# Patient Record
Sex: FEMALE | Race: WHITE | HISPANIC OR LATINO
[De-identification: names, ages, dates, MRNs, and addresses within clinical notes are randomized per-mention and may not be internally consistent; named-entity substitution may affect disease eponyms.]

---

## 2017-04-17 ENCOUNTER — FORM ENCOUNTER (OUTPATIENT)
Age: 57
End: 2017-04-17

## 2017-04-18 ENCOUNTER — APPOINTMENT (OUTPATIENT)
Dept: RADIOLOGY | Facility: CLINIC | Age: 57
End: 2017-04-18

## 2017-04-18 ENCOUNTER — OUTPATIENT (OUTPATIENT)
Dept: OUTPATIENT SERVICES | Facility: HOSPITAL | Age: 57
LOS: 1 days | End: 2017-04-18
Payer: COMMERCIAL

## 2017-04-18 DIAGNOSIS — Z00.8 ENCOUNTER FOR OTHER GENERAL EXAMINATION: ICD-10-CM

## 2017-04-18 PROCEDURE — 73080 X-RAY EXAM OF ELBOW: CPT

## 2017-05-31 ENCOUNTER — RESULT REVIEW (OUTPATIENT)
Age: 57
End: 2017-05-31

## 2017-09-07 ENCOUNTER — APPOINTMENT (OUTPATIENT)
Dept: RADIOLOGY | Facility: CLINIC | Age: 57
End: 2017-09-07
Payer: COMMERCIAL

## 2017-09-07 ENCOUNTER — OUTPATIENT (OUTPATIENT)
Dept: OUTPATIENT SERVICES | Facility: HOSPITAL | Age: 57
LOS: 1 days | End: 2017-09-07
Payer: COMMERCIAL

## 2017-09-07 DIAGNOSIS — Z00.8 ENCOUNTER FOR OTHER GENERAL EXAMINATION: ICD-10-CM

## 2017-09-07 PROCEDURE — 77080 DXA BONE DENSITY AXIAL: CPT

## 2017-09-07 PROCEDURE — 77080 DXA BONE DENSITY AXIAL: CPT | Mod: 26

## 2017-10-12 ENCOUNTER — LABORATORY RESULT (OUTPATIENT)
Age: 57
End: 2017-10-12

## 2017-10-18 LAB
ALBUMIN MFR SERPL ELPH: 65 %
ALBUMIN SERPL ELPH-MCNC: 4.3 G/DL
ALBUMIN SERPL-MCNC: 4.4 G/DL
ALBUMIN/GLOB SERPL: 1.8 RATIO
ALP BLD-CCNC: 74 U/L
ALPHA1 GLOB MFR SERPL ELPH: 4.2 %
ALPHA1 GLOB SERPL ELPH-MCNC: 0.3 G/DL
ALPHA2 GLOB MFR SERPL ELPH: 9.6 %
ALPHA2 GLOB SERPL ELPH-MCNC: 0.7 G/DL
ALT SERPL-CCNC: 26 U/L
ANA PAT FLD IF-IMP: ABNORMAL
ANA SER IF-ACNC: ABNORMAL
ANION GAP SERPL CALC-SCNC: 17 MMOL/L
AST SERPL-CCNC: 30 U/L
B-GLOBULIN MFR SERPL ELPH: 11.3 %
B-GLOBULIN SERPL ELPH-MCNC: 0.8 G/DL
BILIRUB SERPL-MCNC: 0.5 MG/DL
BUN SERPL-MCNC: 12 MG/DL
CA SERPL QL: NORMAL
CALCIUM SERPL-MCNC: 9.6 MG/DL
CHLORIDE SERPL-SCNC: 100 MMOL/L
CO2 SERPL-SCNC: 23 MMOL/L
CREAT SERPL-MCNC: 0.83 MG/DL
CRYOFIB BLD-MCNC: NEGATIVE
CRYOGLOB SERPL-MCNC: NEGATIVE
DEPRECATED KAPPA LC FREE/LAMBDA SER: 1.46 RATIO
GAMMA GLOB FLD ELPH-MCNC: 0.7 G/DL
GAMMA GLOB MFR SERPL ELPH: 9.9 %
GLUCOSE SERPL-MCNC: 105 MG/DL
HCT VFR BLD CALC: 44.7 %
HGB BLD-MCNC: 15.7 G/DL
IGA SER QL IEP: 180 MG/DL
IGG SER QL IEP: 696 MG/DL
IGM SER QL IEP: 70 MG/DL
INTERPRETATION SERPL IEP-IMP: NORMAL
KAPPA LC CSF-MCNC: 1.36 MG/DL
KAPPA LC SERPL-MCNC: 1.99 MG/DL
M PROTEIN SPEC IFE-MCNC: NORMAL
MCHC RBC-ENTMCNC: 31.7 PG
MCHC RBC-ENTMCNC: 35.2 GM/DL
MCV RBC AUTO: 90 FL
PLATELET # BLD AUTO: 345 K/UL
POTASSIUM SERPL-SCNC: 4.3 MMOL/L
PROT SERPL-MCNC: 6.8 G/DL
RBC # BLD: 4.97 M/UL
RBC # FLD: 11.2 %
RHEUMATOID FACT SER QL: 7 IU/ML
SODIUM SERPL-SCNC: 140 MMOL/L
TSH SERPL-ACNC: 0.82 UIU/ML
WBC # FLD AUTO: 4.6 K/UL

## 2017-10-25 ENCOUNTER — LABORATORY RESULT (OUTPATIENT)
Age: 57
End: 2017-10-25

## 2017-10-25 ENCOUNTER — APPOINTMENT (OUTPATIENT)
Dept: RHEUMATOLOGY | Facility: CLINIC | Age: 57
End: 2017-10-25
Payer: COMMERCIAL

## 2017-10-25 VITALS
HEIGHT: 61 IN | BODY MASS INDEX: 17.94 KG/M2 | DIASTOLIC BLOOD PRESSURE: 86 MMHG | SYSTOLIC BLOOD PRESSURE: 119 MMHG | HEART RATE: 66 BPM | WEIGHT: 95 LBS

## 2017-10-25 DIAGNOSIS — Z82.61 FAMILY HISTORY OF ARTHRITIS: ICD-10-CM

## 2017-10-25 DIAGNOSIS — Z78.9 OTHER SPECIFIED HEALTH STATUS: ICD-10-CM

## 2017-10-25 DIAGNOSIS — Z87.898 PERSONAL HISTORY OF OTHER SPECIFIED CONDITIONS: ICD-10-CM

## 2017-10-25 PROCEDURE — 99204 OFFICE O/P NEW MOD 45 MIN: CPT

## 2017-10-26 ENCOUNTER — APPOINTMENT (OUTPATIENT)
Dept: RADIOLOGY | Facility: CLINIC | Age: 57
End: 2017-10-26
Payer: COMMERCIAL

## 2017-10-26 ENCOUNTER — OUTPATIENT (OUTPATIENT)
Dept: OUTPATIENT SERVICES | Facility: HOSPITAL | Age: 57
LOS: 1 days | End: 2017-10-26
Payer: COMMERCIAL

## 2017-10-26 DIAGNOSIS — Z00.8 ENCOUNTER FOR OTHER GENERAL EXAMINATION: ICD-10-CM

## 2017-10-26 LAB
25(OH)D3 SERPL-MCNC: 86.9 NG/ML
BASOPHILS # BLD AUTO: 0.03 K/UL
BASOPHILS NFR BLD AUTO: 0.6 %
C3 SERPL-MCNC: 97 MG/DL
C4 SERPL-MCNC: 23 MG/DL
CCP AB SER IA-ACNC: <8 UNITS
CREAT SPEC-SCNC: 15 MG/DL
CREAT/PROT UR: NORMAL
CRP SERPL-MCNC: <0.2 MG/DL
DSDNA AB SER-ACNC: <12 IU/ML
ENA RNP AB SER IA-ACNC: <0.2 AL
ENA SM AB SER IA-ACNC: <0.2 AL
ENA SS-A AB SER IA-ACNC: <0.2 AL
ENA SS-B AB SER IA-ACNC: <0.2 AL
EOSINOPHIL # BLD AUTO: 0.07 K/UL
EOSINOPHIL NFR BLD AUTO: 1.5 %
ERYTHROCYTE [SEDIMENTATION RATE] IN BLOOD BY WESTERGREN METHOD: 2 MM/HR
HCT VFR BLD CALC: 45.4 %
HGB BLD-MCNC: 15 G/DL
IMM GRANULOCYTES NFR BLD AUTO: 0.2 %
LYMPHOCYTES # BLD AUTO: 1.05 K/UL
LYMPHOCYTES NFR BLD AUTO: 21.8 %
MAN DIFF?: NORMAL
MCHC RBC-ENTMCNC: 31.2 PG
MCHC RBC-ENTMCNC: 33 GM/DL
MCV RBC AUTO: 94.4 FL
MONOCYTES # BLD AUTO: 0.48 K/UL
MONOCYTES NFR BLD AUTO: 10 %
NEUTROPHILS # BLD AUTO: 3.18 K/UL
NEUTROPHILS NFR BLD AUTO: 65.9 %
PLATELET # BLD AUTO: 342 K/UL
PROT UR-MCNC: <4 MG/DL
RBC # BLD: 4.81 M/UL
RBC # FLD: 13.2 %
RF+CCP IGG SER-IMP: NEGATIVE
RHEUMATOID FACT SER QL: 7 IU/ML
WBC # FLD AUTO: 4.82 K/UL

## 2017-10-26 PROCEDURE — 72114 X-RAY EXAM L-S SPINE BENDING: CPT

## 2017-10-26 PROCEDURE — 72114 X-RAY EXAM L-S SPINE BENDING: CPT | Mod: 26

## 2017-10-30 LAB
ANA SER IF-ACNC: NEGATIVE
B2 GLYCOPROT1 IGA SERPL IA-ACNC: <5 SAU
CARDIOLIPIN IGM SER-MCNC: 8.1 MPL
CARDIOLIPIN IGM SER-MCNC: <5 GPL
G6PD SER-CCNC: 11.8 U/G HB
HLA-B27 RELATED AG QL: NORMAL

## 2017-11-01 LAB — HISTONE AB SER QL: 0.4 UNITS

## 2017-11-13 ENCOUNTER — APPOINTMENT (OUTPATIENT)
Dept: RHEUMATOLOGY | Facility: CLINIC | Age: 57
End: 2017-11-13
Payer: COMMERCIAL

## 2017-11-13 VITALS
HEIGHT: 61 IN | DIASTOLIC BLOOD PRESSURE: 85 MMHG | WEIGHT: 95 LBS | HEART RATE: 71 BPM | SYSTOLIC BLOOD PRESSURE: 120 MMHG | BODY MASS INDEX: 17.94 KG/M2

## 2017-11-13 PROCEDURE — 20605 DRAIN/INJ JOINT/BURSA W/O US: CPT | Mod: LT

## 2017-11-13 PROCEDURE — 99214 OFFICE O/P EST MOD 30 MIN: CPT | Mod: 25

## 2017-11-22 ENCOUNTER — OUTPATIENT (OUTPATIENT)
Dept: OUTPATIENT SERVICES | Facility: HOSPITAL | Age: 57
LOS: 1 days | End: 2017-11-22
Payer: COMMERCIAL

## 2017-11-22 ENCOUNTER — APPOINTMENT (OUTPATIENT)
Dept: MAMMOGRAPHY | Facility: CLINIC | Age: 57
End: 2017-11-22
Payer: COMMERCIAL

## 2017-11-22 ENCOUNTER — APPOINTMENT (OUTPATIENT)
Dept: ULTRASOUND IMAGING | Facility: CLINIC | Age: 57
End: 2017-11-22
Payer: COMMERCIAL

## 2017-11-22 DIAGNOSIS — Z00.8 ENCOUNTER FOR OTHER GENERAL EXAMINATION: ICD-10-CM

## 2017-11-22 PROCEDURE — 77063 BREAST TOMOSYNTHESIS BI: CPT | Mod: 26

## 2017-11-22 PROCEDURE — 76641 ULTRASOUND BREAST COMPLETE: CPT | Mod: 26,50

## 2017-11-22 PROCEDURE — 77067 SCR MAMMO BI INCL CAD: CPT

## 2017-11-22 PROCEDURE — G0202: CPT | Mod: 26

## 2017-11-22 PROCEDURE — 77063 BREAST TOMOSYNTHESIS BI: CPT

## 2017-11-22 PROCEDURE — 76641 ULTRASOUND BREAST COMPLETE: CPT

## 2018-03-23 ENCOUNTER — APPOINTMENT (OUTPATIENT)
Dept: INTERNAL MEDICINE | Facility: CLINIC | Age: 58
End: 2018-03-23
Payer: COMMERCIAL

## 2018-03-23 VITALS
RESPIRATION RATE: 16 BRPM | TEMPERATURE: 98.1 F | WEIGHT: 96 LBS | BODY MASS INDEX: 18.12 KG/M2 | SYSTOLIC BLOOD PRESSURE: 100 MMHG | HEIGHT: 61 IN | DIASTOLIC BLOOD PRESSURE: 74 MMHG

## 2018-03-23 DIAGNOSIS — R53.83 OTHER FATIGUE: ICD-10-CM

## 2018-03-23 PROCEDURE — 99396 PREV VISIT EST AGE 40-64: CPT | Mod: 25

## 2018-03-23 PROCEDURE — 90715 TDAP VACCINE 7 YRS/> IM: CPT | Mod: GA

## 2018-03-23 PROCEDURE — 90471 IMMUNIZATION ADMIN: CPT

## 2018-03-23 RX ORDER — DESONIDE 0.5 MG/G
0.05 OINTMENT TOPICAL
Qty: 60 | Refills: 0 | Status: DISCONTINUED | COMMUNITY
Start: 2018-01-24

## 2018-05-15 DIAGNOSIS — Z87.09 PERSONAL HISTORY OF OTHER DISEASES OF THE RESPIRATORY SYSTEM: ICD-10-CM

## 2018-05-21 DIAGNOSIS — J40 BRONCHITIS, NOT SPECIFIED AS ACUTE OR CHRONIC: ICD-10-CM

## 2018-06-05 ENCOUNTER — RESULT REVIEW (OUTPATIENT)
Age: 58
End: 2018-06-05

## 2018-09-13 LAB
APPEARANCE: CLEAR
BACTERIA: NEGATIVE
BILIRUBIN URINE: NEGATIVE
BLOOD URINE: NEGATIVE
CHOLEST SERPL-MCNC: 172 MG/DL
CHOLEST/HDLC SERPL: 2 RATIO
COLOR: YELLOW
GLUCOSE QUALITATIVE U: NEGATIVE MG/DL
HDLC SERPL-MCNC: 87 MG/DL
HYALINE CASTS: 1 /LPF
KETONES URINE: NEGATIVE
LDLC SERPL CALC-MCNC: 72 MG/DL
LEUKOCYTE ESTERASE URINE: NEGATIVE
MICROSCOPIC-UA: NORMAL
NITRITE URINE: NEGATIVE
PH URINE: 6.5
PROTEIN URINE: NEGATIVE MG/DL
RED BLOOD CELLS URINE: 1 /HPF
SPECIFIC GRAVITY URINE: 1.01
SQUAMOUS EPITHELIAL CELLS: 1 /HPF
TRIGL SERPL-MCNC: 64 MG/DL
UROBILINOGEN URINE: NEGATIVE MG/DL
WHITE BLOOD CELLS URINE: 0 /HPF

## 2018-11-27 ENCOUNTER — APPOINTMENT (OUTPATIENT)
Dept: ULTRASOUND IMAGING | Facility: CLINIC | Age: 58
End: 2018-11-27
Payer: COMMERCIAL

## 2018-11-27 ENCOUNTER — APPOINTMENT (OUTPATIENT)
Dept: MAMMOGRAPHY | Facility: CLINIC | Age: 58
End: 2018-11-27
Payer: COMMERCIAL

## 2018-11-27 ENCOUNTER — OUTPATIENT (OUTPATIENT)
Dept: OUTPATIENT SERVICES | Facility: HOSPITAL | Age: 58
LOS: 1 days | End: 2018-11-27
Payer: COMMERCIAL

## 2018-11-27 DIAGNOSIS — Z00.8 ENCOUNTER FOR OTHER GENERAL EXAMINATION: ICD-10-CM

## 2018-11-27 PROCEDURE — 77063 BREAST TOMOSYNTHESIS BI: CPT | Mod: 26

## 2018-11-27 PROCEDURE — 76641 ULTRASOUND BREAST COMPLETE: CPT | Mod: 26,50

## 2018-11-27 PROCEDURE — 77067 SCR MAMMO BI INCL CAD: CPT | Mod: 26

## 2018-11-27 PROCEDURE — 77063 BREAST TOMOSYNTHESIS BI: CPT

## 2018-11-27 PROCEDURE — 76641 ULTRASOUND BREAST COMPLETE: CPT

## 2018-11-27 PROCEDURE — 77067 SCR MAMMO BI INCL CAD: CPT

## 2019-03-08 ENCOUNTER — LABORATORY RESULT (OUTPATIENT)
Age: 59
End: 2019-03-08

## 2019-03-11 LAB
25(OH)D3 SERPL-MCNC: 61.3 NG/ML
ALBUMIN SERPL ELPH-MCNC: 4.9 G/DL
ALP BLD-CCNC: 90 U/L
ALT SERPL-CCNC: 28 U/L
ANION GAP SERPL CALC-SCNC: 11 MMOL/L
APPEARANCE: CLEAR
AST SERPL-CCNC: 28 U/L
BACTERIA: NEGATIVE
BASOPHILS # BLD AUTO: 0.07 K/UL
BASOPHILS NFR BLD AUTO: 1.4 %
BILIRUB SERPL-MCNC: 0.2 MG/DL
BILIRUBIN URINE: NEGATIVE
BLOOD URINE: NEGATIVE
BUN SERPL-MCNC: 16 MG/DL
CALCIUM SERPL-MCNC: 10.5 MG/DL
CHLORIDE SERPL-SCNC: 107 MMOL/L
CHOLEST SERPL-MCNC: 186 MG/DL
CHOLEST/HDLC SERPL: 2.1 RATIO
CO2 SERPL-SCNC: 27 MMOL/L
COLOR: YELLOW
CREAT SERPL-MCNC: 0.74 MG/DL
EOSINOPHIL # BLD AUTO: 0.16 K/UL
EOSINOPHIL NFR BLD AUTO: 3.1 %
GLUCOSE QUALITATIVE U: NEGATIVE
GLUCOSE SERPL-MCNC: 100 MG/DL
HBA1C MFR BLD HPLC: 5.3 %
HCT VFR BLD CALC: 47.8 %
HDLC SERPL-MCNC: 90 MG/DL
HGB BLD-MCNC: 15.1 G/DL
HYALINE CASTS: 1 /LPF
IMM GRANULOCYTES NFR BLD AUTO: 0.2 %
KETONES URINE: NEGATIVE
LDLC SERPL CALC-MCNC: 81 MG/DL
LEUKOCYTE ESTERASE URINE: NEGATIVE
LYMPHOCYTES # BLD AUTO: 1.25 K/UL
LYMPHOCYTES NFR BLD AUTO: 24.2 %
MAN DIFF?: NORMAL
MCHC RBC-ENTMCNC: 30.4 PG
MCHC RBC-ENTMCNC: 31.6 GM/DL
MCV RBC AUTO: 96.2 FL
MICROSCOPIC-UA: NORMAL
MONOCYTES # BLD AUTO: 0.48 K/UL
MONOCYTES NFR BLD AUTO: 9.3 %
NEUTROPHILS # BLD AUTO: 3.19 K/UL
NEUTROPHILS NFR BLD AUTO: 61.8 %
NITRITE URINE: NEGATIVE
PH URINE: 5.5
PLATELET # BLD AUTO: 328 K/UL
POTASSIUM SERPL-SCNC: 4.8 MMOL/L
PROT SERPL-MCNC: 6.9 G/DL
PROTEIN URINE: NEGATIVE
RBC # BLD: 4.97 M/UL
RBC # FLD: 12.7 %
RED BLOOD CELLS URINE: 1 /HPF
SODIUM SERPL-SCNC: 145 MMOL/L
SPECIFIC GRAVITY URINE: 1.02
SQUAMOUS EPITHELIAL CELLS: 1 /HPF
T3FREE SERPL-MCNC: 2.72 PG/ML
T3RU NFR SERPL: 1.1 TBI
T4 FREE SERPL-MCNC: 1.3 NG/DL
T4 SERPL-MCNC: 6 UG/DL
TRIGL SERPL-MCNC: 76 MG/DL
TSH SERPL-ACNC: 0.88 UIU/ML
UROBILINOGEN URINE: NORMAL
WBC # FLD AUTO: 5.16 K/UL
WHITE BLOOD CELLS URINE: 1 /HPF

## 2019-03-29 ENCOUNTER — APPOINTMENT (OUTPATIENT)
Dept: INTERNAL MEDICINE | Facility: CLINIC | Age: 59
End: 2019-03-29
Payer: COMMERCIAL

## 2019-03-29 VITALS
TEMPERATURE: 98.4 F | WEIGHT: 96 LBS | OXYGEN SATURATION: 99 % | RESPIRATION RATE: 16 BRPM | HEART RATE: 66 BPM | DIASTOLIC BLOOD PRESSURE: 70 MMHG | HEIGHT: 61 IN | BODY MASS INDEX: 18.12 KG/M2 | SYSTOLIC BLOOD PRESSURE: 126 MMHG

## 2019-03-29 PROCEDURE — 99396 PREV VISIT EST AGE 40-64: CPT

## 2019-03-29 RX ORDER — AZITHROMYCIN DIHYDRATE 250 MG/1
250 TABLET, FILM COATED ORAL
Qty: 1 | Refills: 0 | Status: DISCONTINUED | COMMUNITY
Start: 2018-05-21 | End: 2019-03-29

## 2019-03-29 RX ORDER — AMOXICILLIN 500 MG/1
500 CAPSULE ORAL EVERY 8 HOURS
Qty: 21 | Refills: 1 | Status: DISCONTINUED | COMMUNITY
Start: 2018-05-15 | End: 2019-03-29

## 2019-03-29 NOTE — PLAN
[FreeTextEntry1] : 1. Continue to observe without prescribe medications at this time.\par \par 2. The patient will continue with Advil 400 mg b.i.d. p.r.n. on her own for treatment of her arthritic issues\par \par 3. Routine GYN followup in June.\par \par 4. The patient will return to the office with a new shingles vaccine when it becomes available.\par \par 5. Continue with current exercise regimen.\par \par 6. Follow up with myself in one year with a wellness evaluation and routine fasting blood work.

## 2019-03-29 NOTE — HEALTH RISK ASSESSMENT
[Patient reported mammogram was normal] : Patient reported mammogram was normal [Patient reported PAP Smear was normal] : Patient reported PAP Smear was normal [Patient reported colonoscopy was normal] : Patient reported colonoscopy was normal [Employed] : employed [Smoke Detector] : smoke detector [Carbon Monoxide Detector] : carbon monoxide detector [Seat Belt] :  uses seat belt [Sunscreen] : uses sunscreen [0] : 2) Feeling down, depressed, or hopeless: Not at all (0) [] : No [de-identified] : occasional  [Guns at Home] : no guns at home [MammogramDate] : 01/2018 [PapSmearDate] : 01/2018 [ColonoscopyDate] : 01/2017 [FreeTextEntry2] :

## 2019-03-29 NOTE — PHYSICAL EXAM

## 2019-03-29 NOTE — HISTORY OF PRESENT ILLNESS
[de-identified] : The patient comes in today for a wellness evaluation.\par \par Overall, the patient states that she is fairly stable. Her major complaint continues to be that of generalized arthritic pains primarily in her neck. She takes 400 mg of Motrin twice a day almost on a daily basis. She denies any chest pains. There have been no fevers or chills. There have been no night sweats. She does exercise 4-5 days a week on a bicycle and lifting weights.\par \par The patient noted slight discoloration of her eyelids. She was using some type of eyelid lash enhancer to help her eyelids grow. She was seen by her dermatologist earlier today who felt that she may have been having a reaction to the medication that she was using. She was seen by her gynecologist last year. She is scheduled to followup with GYN in June. There has been no change in bowel habits. Her last colonoscopy was 3 years ago. She now comes in for this assessment.

## 2019-06-21 ENCOUNTER — APPOINTMENT (OUTPATIENT)
Dept: INTERNAL MEDICINE | Facility: CLINIC | Age: 59
End: 2019-06-21
Payer: COMMERCIAL

## 2019-06-21 ENCOUNTER — MED ADMIN CHARGE (OUTPATIENT)
Age: 59
End: 2019-06-21

## 2019-06-21 PROCEDURE — 90471 IMMUNIZATION ADMIN: CPT

## 2019-06-21 PROCEDURE — 90750 HZV VACC RECOMBINANT IM: CPT

## 2019-09-05 ENCOUNTER — APPOINTMENT (OUTPATIENT)
Dept: INTERNAL MEDICINE | Facility: CLINIC | Age: 59
End: 2019-09-05
Payer: COMMERCIAL

## 2019-09-05 PROCEDURE — 90471 IMMUNIZATION ADMIN: CPT

## 2019-09-05 PROCEDURE — 90750 HZV VACC RECOMBINANT IM: CPT

## 2019-09-23 ENCOUNTER — RESULT REVIEW (OUTPATIENT)
Age: 59
End: 2019-09-23

## 2019-11-29 ENCOUNTER — OUTPATIENT (OUTPATIENT)
Dept: OUTPATIENT SERVICES | Facility: HOSPITAL | Age: 59
LOS: 1 days | End: 2019-11-29
Payer: COMMERCIAL

## 2019-11-29 ENCOUNTER — APPOINTMENT (OUTPATIENT)
Dept: MAMMOGRAPHY | Facility: CLINIC | Age: 59
End: 2019-11-29
Payer: COMMERCIAL

## 2019-11-29 ENCOUNTER — APPOINTMENT (OUTPATIENT)
Dept: ULTRASOUND IMAGING | Facility: CLINIC | Age: 59
End: 2019-11-29
Payer: COMMERCIAL

## 2019-11-29 DIAGNOSIS — Z00.8 ENCOUNTER FOR OTHER GENERAL EXAMINATION: ICD-10-CM

## 2019-11-29 PROCEDURE — 77067 SCR MAMMO BI INCL CAD: CPT | Mod: 26

## 2019-11-29 PROCEDURE — 76641 ULTRASOUND BREAST COMPLETE: CPT

## 2019-11-29 PROCEDURE — 77063 BREAST TOMOSYNTHESIS BI: CPT | Mod: 26

## 2019-11-29 PROCEDURE — 77067 SCR MAMMO BI INCL CAD: CPT

## 2019-11-29 PROCEDURE — 76641 ULTRASOUND BREAST COMPLETE: CPT | Mod: 26,50

## 2019-11-29 PROCEDURE — 77063 BREAST TOMOSYNTHESIS BI: CPT

## 2020-04-08 ENCOUNTER — APPOINTMENT (OUTPATIENT)
Dept: INTERNAL MEDICINE | Facility: CLINIC | Age: 60
End: 2020-04-08

## 2020-04-25 ENCOUNTER — MESSAGE (OUTPATIENT)
Age: 60
End: 2020-04-25

## 2020-05-12 ENCOUNTER — APPOINTMENT (OUTPATIENT)
Dept: DISASTER EMERGENCY | Facility: CLINIC | Age: 60
End: 2020-05-12

## 2020-05-14 ENCOUNTER — APPOINTMENT (OUTPATIENT)
Dept: INTERNAL MEDICINE | Facility: CLINIC | Age: 60
End: 2020-05-14
Payer: COMMERCIAL

## 2020-05-14 VITALS
OXYGEN SATURATION: 98 % | SYSTOLIC BLOOD PRESSURE: 126 MMHG | DIASTOLIC BLOOD PRESSURE: 80 MMHG | HEART RATE: 88 BPM | WEIGHT: 94 LBS | HEIGHT: 60 IN | RESPIRATION RATE: 16 BRPM | BODY MASS INDEX: 18.46 KG/M2

## 2020-05-14 DIAGNOSIS — Z11.59 ENCOUNTER FOR SCREENING FOR OTHER VIRAL DISEASES: ICD-10-CM

## 2020-05-14 DIAGNOSIS — M85.80 OTHER SPECIFIED DISORDERS OF BONE DENSITY AND STRUCTURE, UNSPECIFIED SITE: ICD-10-CM

## 2020-05-14 PROCEDURE — G0444 DEPRESSION SCREEN ANNUAL: CPT | Mod: NC,59

## 2020-05-14 PROCEDURE — 99396 PREV VISIT EST AGE 40-64: CPT

## 2020-05-14 NOTE — ASSESSMENT
[FreeTextEntry1] : 1.health maintenance: Discussed fasting blood work to get. Also will have covid antibody testing done. Follow up with GYN regarding repeat bone density. Up-to-date with vaccines. Next\par \par 2.insomnia: Refills of Ambien from GYN.\par \par 3.osteopenia: Last bone density in 2016, overdue at this time.

## 2020-05-14 NOTE — HISTORY OF PRESENT ILLNESS
[FreeTextEntry1] : Patient comes in for an annual physical exam.\par  [de-identified] : Patient is a 59-year-old female with history of insomnia, osteopenia, degenerative disc disease comes in for annual exam. She is feeling generally well. 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 She did have 2 nurses at her office positive for cold it but she herself has not had any symptoms. She would like antibody testing.\par Patient denies any cp, sob,abdominal pain, nausea, vomiting, palpitations, fever, chills, constipation, diarrhea.\par

## 2020-05-14 NOTE — HEALTH RISK ASSESSMENT
[Yes] : Yes [1] : 2) Feeling down, depressed, or hopeless for several days (1) [Employed] : employed [Carbon Monoxide Detector] : carbon monoxide detector [Smoke Detector] : smoke detector [Safety elements used in home] : safety elements used in home [Seat Belt] :  uses seat belt [Sunscreen] : uses sunscreen [] : No [de-identified] : occasional [[id number]] : 2 [Patient reported mammogram was normal] : Patient reported mammogram was normal [Patient reported PAP Smear was normal] : Patient reported PAP Smear was normal [Patient reported bone density results were abnormal] : Patient reported bone density results were abnormal [Patient reported colonoscopy was normal] : Patient reported colonoscopy was normal [Fully functional (bathing, dressing, toileting, transferring, walking, feeding)] : Fully functional (bathing, dressing, toileting, transferring, walking, feeding) [] :  [With Family] : lives with family [Fully functional (using the telephone, shopping, preparing meals, housekeeping, doing laundry, using] : Fully functional and needs no help or supervision to perform IADLs (using the telephone, shopping, preparing meals, housekeeping, doing laundry, using transportation, managing medications and managing finances) [MammogramDate] : 11/19 [Guns at Home] : no guns at home [BoneDensityComments] : osteopenia. dr moran [PapSmearDate] : 2019 [BoneDensityDate] : 2016 [ColonoscopyDate] : 2016 [FreeTextEntry2] :

## 2020-05-16 LAB
25(OH)D3 SERPL-MCNC: 55.5 NG/ML
ALBUMIN SERPL ELPH-MCNC: 4.6 G/DL
ALP BLD-CCNC: 82 U/L
ALT SERPL-CCNC: 24 U/L
ANION GAP SERPL CALC-SCNC: 14 MMOL/L
AST SERPL-CCNC: 30 U/L
BASOPHILS # BLD AUTO: 0.05 K/UL
BASOPHILS NFR BLD AUTO: 1.1 %
BILIRUB SERPL-MCNC: 0.4 MG/DL
BUN SERPL-MCNC: 9 MG/DL
CALCIUM SERPL-MCNC: 10.1 MG/DL
CHLORIDE SERPL-SCNC: 103 MMOL/L
CHOLEST SERPL-MCNC: 176 MG/DL
CHOLEST/HDLC SERPL: 1.9 RATIO
CO2 SERPL-SCNC: 27 MMOL/L
CREAT SERPL-MCNC: 0.79 MG/DL
EOSINOPHIL # BLD AUTO: 0.13 K/UL
EOSINOPHIL NFR BLD AUTO: 2.8 %
ESTIMATED AVERAGE GLUCOSE: 105 MG/DL
GLUCOSE SERPL-MCNC: 109 MG/DL
HBA1C MFR BLD HPLC: 5.3 %
HCT VFR BLD CALC: 45.5 %
HDLC SERPL-MCNC: 92 MG/DL
HGB BLD-MCNC: 15.1 G/DL
IMM GRANULOCYTES NFR BLD AUTO: 0.2 %
LDLC SERPL CALC-MCNC: 75 MG/DL
LYMPHOCYTES # BLD AUTO: 1.16 K/UL
LYMPHOCYTES NFR BLD AUTO: 25 %
MAN DIFF?: NORMAL
MCHC RBC-ENTMCNC: 31.2 PG
MCHC RBC-ENTMCNC: 33.2 GM/DL
MCV RBC AUTO: 94 FL
MONOCYTES # BLD AUTO: 0.51 K/UL
MONOCYTES NFR BLD AUTO: 11 %
NEUTROPHILS # BLD AUTO: 2.78 K/UL
NEUTROPHILS NFR BLD AUTO: 59.9 %
PLATELET # BLD AUTO: 343 K/UL
POTASSIUM SERPL-SCNC: 4.5 MMOL/L
PROT SERPL-MCNC: 6.2 G/DL
RBC # BLD: 4.84 M/UL
RBC # FLD: 12.6 %
SARS-COV-2 IGG SERPL IA-ACNC: 0 INDEX
SARS-COV-2 IGG SERPL QL IA: NEGATIVE
SODIUM SERPL-SCNC: 144 MMOL/L
TRIGL SERPL-MCNC: 42 MG/DL
TSH SERPL-ACNC: 1.11 UIU/ML
WBC # FLD AUTO: 4.64 K/UL

## 2020-05-19 ENCOUNTER — APPOINTMENT (OUTPATIENT)
Dept: DISASTER EMERGENCY | Facility: CLINIC | Age: 60
End: 2020-05-19

## 2020-07-07 ENCOUNTER — APPOINTMENT (OUTPATIENT)
Dept: INTERNAL MEDICINE | Facility: CLINIC | Age: 60
End: 2020-07-07

## 2020-11-10 ENCOUNTER — RESULT REVIEW (OUTPATIENT)
Age: 60
End: 2020-11-10

## 2020-12-05 ENCOUNTER — APPOINTMENT (OUTPATIENT)
Dept: ULTRASOUND IMAGING | Facility: CLINIC | Age: 60
End: 2020-12-05
Payer: COMMERCIAL

## 2020-12-05 ENCOUNTER — OUTPATIENT (OUTPATIENT)
Dept: OUTPATIENT SERVICES | Facility: HOSPITAL | Age: 60
LOS: 1 days | End: 2020-12-05
Payer: COMMERCIAL

## 2020-12-05 ENCOUNTER — APPOINTMENT (OUTPATIENT)
Dept: MAMMOGRAPHY | Facility: CLINIC | Age: 60
End: 2020-12-05
Payer: COMMERCIAL

## 2020-12-05 DIAGNOSIS — Z00.8 ENCOUNTER FOR OTHER GENERAL EXAMINATION: ICD-10-CM

## 2020-12-05 PROCEDURE — 76641 ULTRASOUND BREAST COMPLETE: CPT | Mod: 26,50

## 2020-12-05 PROCEDURE — 77063 BREAST TOMOSYNTHESIS BI: CPT

## 2020-12-05 PROCEDURE — 77067 SCR MAMMO BI INCL CAD: CPT | Mod: 26

## 2020-12-05 PROCEDURE — 77067 SCR MAMMO BI INCL CAD: CPT

## 2020-12-05 PROCEDURE — 77063 BREAST TOMOSYNTHESIS BI: CPT | Mod: 26

## 2020-12-05 PROCEDURE — 76641 ULTRASOUND BREAST COMPLETE: CPT

## 2020-12-16 PROBLEM — Z87.09 HISTORY OF PHARYNGITIS: Status: RESOLVED | Noted: 2018-05-15 | Resolved: 2020-12-16

## 2021-01-07 ENCOUNTER — OUTPATIENT (OUTPATIENT)
Dept: OUTPATIENT SERVICES | Facility: HOSPITAL | Age: 61
LOS: 1 days | End: 2021-01-07
Payer: COMMERCIAL

## 2021-01-07 ENCOUNTER — APPOINTMENT (OUTPATIENT)
Dept: RADIOLOGY | Facility: CLINIC | Age: 61
End: 2021-01-07
Payer: COMMERCIAL

## 2021-01-07 DIAGNOSIS — Z00.8 ENCOUNTER FOR OTHER GENERAL EXAMINATION: ICD-10-CM

## 2021-01-07 PROCEDURE — 77080 DXA BONE DENSITY AXIAL: CPT | Mod: 26

## 2021-01-07 PROCEDURE — 77080 DXA BONE DENSITY AXIAL: CPT

## 2021-02-22 ENCOUNTER — OUTPATIENT (OUTPATIENT)
Dept: OUTPATIENT SERVICES | Facility: HOSPITAL | Age: 61
LOS: 1 days | Discharge: HOME | End: 2021-02-22

## 2021-02-22 DIAGNOSIS — Z11.59 ENCOUNTER FOR SCREENING FOR OTHER VIRAL DISEASES: ICD-10-CM

## 2021-02-25 ENCOUNTER — LABORATORY RESULT (OUTPATIENT)
Age: 61
End: 2021-02-25

## 2021-02-25 ENCOUNTER — APPOINTMENT (OUTPATIENT)
Dept: HEMATOLOGY ONCOLOGY | Facility: CLINIC | Age: 61
End: 2021-02-25

## 2021-05-08 ENCOUNTER — LABORATORY RESULT (OUTPATIENT)
Age: 61
End: 2021-05-08

## 2021-05-19 ENCOUNTER — APPOINTMENT (OUTPATIENT)
Dept: INTERNAL MEDICINE | Facility: CLINIC | Age: 61
End: 2021-05-19
Payer: COMMERCIAL

## 2021-05-19 VITALS
WEIGHT: 97 LBS | TEMPERATURE: 97.3 F | BODY MASS INDEX: 19.04 KG/M2 | OXYGEN SATURATION: 96 % | HEIGHT: 60 IN | SYSTOLIC BLOOD PRESSURE: 112 MMHG | RESPIRATION RATE: 16 BRPM | HEART RATE: 86 BPM | DIASTOLIC BLOOD PRESSURE: 80 MMHG

## 2021-05-19 DIAGNOSIS — E55.9 VITAMIN D DEFICIENCY, UNSPECIFIED: ICD-10-CM

## 2021-05-19 PROCEDURE — G0444 DEPRESSION SCREEN ANNUAL: CPT | Mod: NC,59

## 2021-05-19 PROCEDURE — 99396 PREV VISIT EST AGE 40-64: CPT | Mod: 25

## 2021-05-19 PROCEDURE — 99072 ADDL SUPL MATRL&STAF TM PHE: CPT

## 2021-05-19 NOTE — HISTORY OF PRESENT ILLNESS
[FreeTextEntry1] : CPE [de-identified] : KIM ROLON is a 60 year F who comes in for an annual physical exam.\par Pt recently had BMD with  which showed osteoporosis of left femoral neck and hip and started on Boniva. She is not having SE of medication. \par Her Raynaud's has worsened recently especially in cold with numbness/discoloration of toes and fingers. Her right hand 4th digit nail has not grown back over the last 15 mo and she did have evaluation with Derm last yr. \par She also notes cramps in the lower legs, and admits to not being to hydrate regularly. \par Recent bw results reviewed. \par Patient denies any cp, sob,abdominal pain, nausea, vomiting, palpitations, fever, chills, constipation, diarrhea.\par

## 2021-05-19 NOTE — ASSESSMENT
[FreeTextEntry1] : 1.HM: UTD with mammogram, BMD, colonoscopy and pap smear. Patient counseled regarding recommendations for vaccines, seat belt safety, diet and exercise and all preventative screening. Recent bw results reviewed. \par \par 2.Raynaud's Phenomenon: will start on Amlodipine 2.5 mg as needed for increased digit pain/numbness. Went over instructions and side effects of medication.\par \par 3.Onychomycosis of nail bed: will f/u with derm next month. \par \par 4. Osteoporosis: continue on boniva and repeat bmd in 2-3 yrs.

## 2021-05-19 NOTE — HEALTH RISK ASSESSMENT
[Yes] : Yes [2 - 4 times a month (2 pts)] : 2-4 times a month (2 points) [1 or 2 (0 pts)] : 1 or 2 (0 points) [Never (0 pts)] : Never (0 points) [No] : In the past 12 months have you used drugs other than those required for medical reasons? No [0] : 2) Feeling down, depressed, or hopeless: Not at all (0) [Patient reported mammogram was normal] : Patient reported mammogram was normal [Patient reported PAP Smear was normal] : Patient reported PAP Smear was normal [Patient reported bone density results were abnormal] : Patient reported bone density results were abnormal [Patient reported colonoscopy was normal] : Patient reported colonoscopy was normal [Employed] : employed [Fully functional (bathing, dressing, toileting, transferring, walking, feeding)] : Fully functional (bathing, dressing, toileting, transferring, walking, feeding) [Fully functional (using the telephone, shopping, preparing meals, housekeeping, doing laundry, using] : Fully functional and needs no help or supervision to perform IADLs (using the telephone, shopping, preparing meals, housekeeping, doing laundry, using transportation, managing medications and managing finances) [] : No [[id number]] : 0 [MammogramDate] : 5/2021 [PapSmearDate] : 5/2021 [BoneDensityDate] : 5/2021 [ColonoscopyDate] : 2021 [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

## 2021-06-23 ENCOUNTER — NON-APPOINTMENT (OUTPATIENT)
Age: 61
End: 2021-06-23

## 2021-07-14 ENCOUNTER — APPOINTMENT (OUTPATIENT)
Dept: DERMATOLOGY | Facility: CLINIC | Age: 61
End: 2021-07-14
Payer: COMMERCIAL

## 2021-07-14 DIAGNOSIS — R21 RASH AND OTHER NONSPECIFIC SKIN ERUPTION: ICD-10-CM

## 2021-07-14 PROCEDURE — 99072 ADDL SUPL MATRL&STAF TM PHE: CPT

## 2021-07-14 PROCEDURE — 11102 TANGNTL BX SKIN SINGLE LES: CPT

## 2021-07-14 PROCEDURE — 99203 OFFICE O/P NEW LOW 30 MIN: CPT | Mod: 25

## 2021-07-14 NOTE — HISTORY OF PRESENT ILLNESS
[de-identified] : Patient c/o longstanding problems with nail; \par Right 4th finger;\par occurred suddenly; \par had eval by other office; \par has had no treatments; \par

## 2021-07-14 NOTE — PHYSICAL EXAM
[FreeTextEntry3] : r 4th finger;  \par small median ridge, with widespread dystrophic changes affecting most of nail;

## 2021-07-23 LAB — CORE LAB BIOPSY: NORMAL

## 2021-08-16 LAB
ALBUMIN SERPL ELPH-MCNC: 4.7 G/DL
ALP BLD-CCNC: 69 U/L
ALT SERPL-CCNC: 18 U/L
AST SERPL-CCNC: 24 U/L
BILIRUB DIRECT SERPL-MCNC: 0.1 MG/DL
BILIRUB INDIRECT SERPL-MCNC: 0.2 MG/DL
BILIRUB SERPL-MCNC: 0.2 MG/DL
PROT SERPL-MCNC: 6.6 G/DL

## 2021-09-20 ENCOUNTER — RX RENEWAL (OUTPATIENT)
Age: 61
End: 2021-09-20

## 2021-11-01 ENCOUNTER — APPOINTMENT (OUTPATIENT)
Dept: DERMATOLOGY | Facility: CLINIC | Age: 61
End: 2021-11-01
Payer: COMMERCIAL

## 2021-11-01 PROCEDURE — 99214 OFFICE O/P EST MOD 30 MIN: CPT

## 2021-11-01 RX ORDER — AMLODIPINE BESYLATE 5 MG/1
5 TABLET ORAL DAILY
Qty: 90 | Refills: 0 | Status: DISCONTINUED | COMMUNITY
Start: 2021-05-19 | End: 2021-11-01

## 2021-11-01 NOTE — HISTORY OF PRESENT ILLNESS
[de-identified] : f/u;  s/p onycho of R 4th fingernail;  PAS proven;\par took terbinafine for 6 weeks, still c/o problems with nail

## 2021-11-01 NOTE — PHYSICAL EXAM
[FreeTextEntry3] : R 4th fingernail;  + normal nail at base, but with onycholysis and debris; \par distal 1/2 of nail gone

## 2021-11-01 NOTE — ASSESSMENT
[FreeTextEntry1] : nail dystrophy;  persistent despite appropriate antifungal Tx;\par \par Therapeutic options and their risks and benefits; along with multiple diagnostic possibilities were discussed at length; risks and benefits of further study were discussed;\par \par may be due to trauma, matrix damage?\par BandAid advanced healing q 3d;  keep covered;  recheck in another 3 mos;

## 2021-11-24 ENCOUNTER — RESULT REVIEW (OUTPATIENT)
Age: 61
End: 2021-11-24

## 2021-12-11 ENCOUNTER — OUTPATIENT (OUTPATIENT)
Dept: OUTPATIENT SERVICES | Facility: HOSPITAL | Age: 61
LOS: 1 days | End: 2021-12-11
Payer: COMMERCIAL

## 2021-12-11 ENCOUNTER — APPOINTMENT (OUTPATIENT)
Dept: MAMMOGRAPHY | Facility: CLINIC | Age: 61
End: 2021-12-11
Payer: COMMERCIAL

## 2021-12-11 ENCOUNTER — APPOINTMENT (OUTPATIENT)
Dept: ULTRASOUND IMAGING | Facility: CLINIC | Age: 61
End: 2021-12-11
Payer: COMMERCIAL

## 2021-12-11 DIAGNOSIS — Z00.8 ENCOUNTER FOR OTHER GENERAL EXAMINATION: ICD-10-CM

## 2021-12-11 PROCEDURE — 76641 ULTRASOUND BREAST COMPLETE: CPT | Mod: 26,50

## 2021-12-11 PROCEDURE — 77063 BREAST TOMOSYNTHESIS BI: CPT | Mod: 26

## 2021-12-11 PROCEDURE — 77063 BREAST TOMOSYNTHESIS BI: CPT

## 2021-12-11 PROCEDURE — 76641 ULTRASOUND BREAST COMPLETE: CPT

## 2021-12-11 PROCEDURE — 77067 SCR MAMMO BI INCL CAD: CPT | Mod: 26

## 2021-12-11 PROCEDURE — 77067 SCR MAMMO BI INCL CAD: CPT

## 2022-01-04 ENCOUNTER — APPOINTMENT (OUTPATIENT)
Dept: HEMATOLOGY ONCOLOGY | Facility: CLINIC | Age: 62
End: 2022-01-04

## 2022-01-05 ENCOUNTER — LABORATORY RESULT (OUTPATIENT)
Age: 62
End: 2022-01-05

## 2022-04-11 PROBLEM — Z11.59 SCREENING FOR VIRAL DISEASE: Status: ACTIVE | Noted: 2020-05-14

## 2022-05-15 LAB
25(OH)D3 SERPL-MCNC: 135 NG/ML
ALBUMIN SERPL ELPH-MCNC: 4.6 G/DL
ALP BLD-CCNC: 75 U/L
ALT SERPL-CCNC: 19 U/L
ANION GAP SERPL CALC-SCNC: 11 MMOL/L
APPEARANCE: ABNORMAL
AST SERPL-CCNC: 23 U/L
BACTERIA: NEGATIVE
BASOPHILS # BLD AUTO: 0.06 K/UL
BASOPHILS NFR BLD AUTO: 1.5 %
BILIRUB SERPL-MCNC: 0.3 MG/DL
BILIRUBIN URINE: NEGATIVE
BLOOD URINE: NEGATIVE
BUN SERPL-MCNC: 11 MG/DL
CALCIUM SERPL-MCNC: 9.7 MG/DL
CHLORIDE SERPL-SCNC: 106 MMOL/L
CHOLEST SERPL-MCNC: 188 MG/DL
CO2 SERPL-SCNC: 27 MMOL/L
COLOR: YELLOW
CREAT SERPL-MCNC: 0.84 MG/DL
EGFR: 78 ML/MIN/1.73M2
EOSINOPHIL # BLD AUTO: 0.25 K/UL
EOSINOPHIL NFR BLD AUTO: 6.1 %
GLUCOSE QUALITATIVE U: NEGATIVE
GLUCOSE SERPL-MCNC: 100 MG/DL
HCT VFR BLD CALC: 47 %
HDLC SERPL-MCNC: 83 MG/DL
HGB BLD-MCNC: 14.9 G/DL
HYALINE CASTS: 0 /LPF
IMM GRANULOCYTES NFR BLD AUTO: 0.5 %
KETONES URINE: NEGATIVE
LDLC SERPL CALC-MCNC: 97 MG/DL
LEUKOCYTE ESTERASE URINE: NEGATIVE
LYMPHOCYTES # BLD AUTO: 1.18 K/UL
LYMPHOCYTES NFR BLD AUTO: 28.6 %
MAN DIFF?: NORMAL
MCHC RBC-ENTMCNC: 30.2 PG
MCHC RBC-ENTMCNC: 31.7 GM/DL
MCV RBC AUTO: 95.1 FL
MICROSCOPIC-UA: NORMAL
MONOCYTES # BLD AUTO: 0.47 K/UL
MONOCYTES NFR BLD AUTO: 11.4 %
NEUTROPHILS # BLD AUTO: 2.14 K/UL
NEUTROPHILS NFR BLD AUTO: 51.9 %
NITRITE URINE: NEGATIVE
NONHDLC SERPL-MCNC: 105 MG/DL
PH URINE: 7.5
PLATELET # BLD AUTO: 412 K/UL
POTASSIUM SERPL-SCNC: 4.8 MMOL/L
PROT SERPL-MCNC: 6.5 G/DL
PROTEIN URINE: NEGATIVE
RBC # BLD: 4.94 M/UL
RBC # FLD: 13 %
RED BLOOD CELLS URINE: 0 /HPF
SODIUM SERPL-SCNC: 145 MMOL/L
SPECIFIC GRAVITY URINE: 1.01
SQUAMOUS EPITHELIAL CELLS: 0 /HPF
T4 SERPL-MCNC: 7.1 UG/DL
TRIGL SERPL-MCNC: 44 MG/DL
TSH SERPL-ACNC: 1.37 UIU/ML
UROBILINOGEN URINE: NORMAL
WBC # FLD AUTO: 4.12 K/UL
WHITE BLOOD CELLS URINE: 0 /HPF

## 2022-05-16 ENCOUNTER — NON-APPOINTMENT (OUTPATIENT)
Age: 62
End: 2022-05-16

## 2022-05-25 ENCOUNTER — APPOINTMENT (OUTPATIENT)
Dept: INTERNAL MEDICINE | Facility: CLINIC | Age: 62
End: 2022-05-25
Payer: COMMERCIAL

## 2022-05-25 VITALS
TEMPERATURE: 98.1 F | OXYGEN SATURATION: 96 % | DIASTOLIC BLOOD PRESSURE: 70 MMHG | RESPIRATION RATE: 16 BRPM | BODY MASS INDEX: 19.04 KG/M2 | WEIGHT: 97 LBS | HEIGHT: 60 IN | HEART RATE: 56 BPM | SYSTOLIC BLOOD PRESSURE: 118 MMHG

## 2022-05-25 DIAGNOSIS — Z80.42 FAMILY HISTORY OF MALIGNANT NEOPLASM OF PROSTATE: ICD-10-CM

## 2022-05-25 DIAGNOSIS — Z80.0 FAMILY HISTORY OF MALIGNANT NEOPLASM OF DIGESTIVE ORGANS: ICD-10-CM

## 2022-05-25 DIAGNOSIS — Z82.69 FAMILY HISTORY OF OTHER DISEASES OF THE MUSCULOSKELETAL SYSTEM AND CONNECTIVE TISSUE: ICD-10-CM

## 2022-05-25 DIAGNOSIS — Z80.1 FAMILY HISTORY OF MALIGNANT NEOPLASM OF TRACHEA, BRONCHUS AND LUNG: ICD-10-CM

## 2022-05-25 DIAGNOSIS — Z83.3 FAMILY HISTORY OF DIABETES MELLITUS: ICD-10-CM

## 2022-05-25 DIAGNOSIS — Z80.8 FAMILY HISTORY OF MALIGNANT NEOPLASM OF OTHER ORGANS OR SYSTEMS: ICD-10-CM

## 2022-05-25 DIAGNOSIS — R25.2 CRAMP AND SPASM: ICD-10-CM

## 2022-05-25 PROCEDURE — 99396 PREV VISIT EST AGE 40-64: CPT | Mod: 25

## 2022-05-25 PROCEDURE — 93000 ELECTROCARDIOGRAM COMPLETE: CPT | Mod: 59

## 2022-05-25 PROCEDURE — 0064A: CPT

## 2022-05-25 NOTE — HEALTH RISK ASSESSMENT
[Never] : Never [Yes] : Yes [2 - 4 times a month (2 pts)] : 2-4 times a month (2 points) [1 or 2 (0 pts)] : 1 or 2 (0 points) [Never (0 pts)] : Never (0 points) [No] : In the past 12 months have you used drugs other than those required for medical reasons? No [0] : 2) Feeling down, depressed, or hopeless: Not at all (0) [Patient reported mammogram was normal] : Patient reported mammogram was normal [Patient reported PAP Smear was normal] : Patient reported PAP Smear was normal [Patient reported colonoscopy was normal] : Patient reported colonoscopy was normal [[id number]] : 0 [MammogramDate] : 11/2021 [PapSmearDate] : 11/2021 [BoneDensityDate] : 1/1/20 [ColonoscopyDate] : 01/2022

## 2022-06-04 ENCOUNTER — OUTPATIENT (OUTPATIENT)
Dept: OUTPATIENT SERVICES | Facility: HOSPITAL | Age: 62
LOS: 1 days | End: 2022-06-04
Payer: COMMERCIAL

## 2022-06-04 ENCOUNTER — APPOINTMENT (OUTPATIENT)
Dept: RADIOLOGY | Facility: CLINIC | Age: 62
End: 2022-06-04
Payer: COMMERCIAL

## 2022-06-04 DIAGNOSIS — Z00.00 ENCOUNTER FOR GENERAL ADULT MEDICAL EXAMINATION WITHOUT ABNORMAL FINDINGS: ICD-10-CM

## 2022-06-04 PROCEDURE — 71046 X-RAY EXAM CHEST 2 VIEWS: CPT | Mod: 26

## 2022-06-04 PROCEDURE — 71046 X-RAY EXAM CHEST 2 VIEWS: CPT

## 2022-06-06 ENCOUNTER — NON-APPOINTMENT (OUTPATIENT)
Age: 62
End: 2022-06-06

## 2022-08-01 ENCOUNTER — APPOINTMENT (OUTPATIENT)
Dept: DERMATOLOGY | Facility: CLINIC | Age: 62
End: 2022-08-01

## 2022-08-01 PROCEDURE — 99214 OFFICE O/P EST MOD 30 MIN: CPT

## 2022-08-01 NOTE — ASSESSMENT
[FreeTextEntry1] : Complete skin examination is negative for malignancy; Multiple new concerns were addressed and discussed.\par Therapeutic options and their risks and benefits; along with multiple diagnostic possibilities were discussed at length;\par risks and benefits of skin biopsy and/or other further study were discussed;\par \par multiple SKs; \par Continue regular exams; Follow up for TBSE in 1 year \par \par onycho;  persistent;  options discussed \par will try 3 month course of terbinafine;  tolerated in past; \par 250/d x 3 mos;  check labs first month; \par may need hand surgery eval for structural damage;

## 2022-08-01 NOTE — HISTORY OF PRESENT ILLNESS
[de-identified] : Pt. presents for skin check;\par c/o few spots of concern;  \par Severity:  mild  \par Modifying factors:  none\par Associated symptoms:  none\par Context:  no association with activity \par also:  persistent nail issues R hand;  thumb, 4th finger;  took terbinafine x 6 wks last year, tolerated, did well but recurred;

## 2022-08-01 NOTE — PHYSICAL EXAM
[Full Body Skin Exam Performed] : performed [FreeTextEntry3] : Skin examination performed of the face, neck, trunk, arms, legs; \par The patient is well, alert and oriented, pleasant and cooperative.\par Eyelids, conjunctivae, oral mucosa, digits and nails all normal.  \par No cervical adenopathy.\par \par Normal findings include:\par \par Seborrheic keratoses- TNTC on flanks; macular lesion L lateral cheek\par Angiomas\par Lentigines\par \par No lesions were suspicious for malignancy. \par \par dystrophic nails; with lysis;  R 1,4 fingers;  \par + onycho on Bx in 2021; \par \par

## 2022-08-22 LAB
ALBUMIN SERPL ELPH-MCNC: 4.5 G/DL
ALP BLD-CCNC: 73 U/L
ALT SERPL-CCNC: 23 U/L
AST SERPL-CCNC: 27 U/L
BILIRUB DIRECT SERPL-MCNC: 0.1 MG/DL
BILIRUB INDIRECT SERPL-MCNC: 0.2 MG/DL
BILIRUB SERPL-MCNC: 0.3 MG/DL
PROT SERPL-MCNC: 6.5 G/DL

## 2022-09-08 ENCOUNTER — LABORATORY RESULT (OUTPATIENT)
Age: 62
End: 2022-09-08

## 2022-09-08 ENCOUNTER — APPOINTMENT (OUTPATIENT)
Dept: RHEUMATOLOGY | Facility: CLINIC | Age: 62
End: 2022-09-08

## 2022-09-08 ENCOUNTER — NON-APPOINTMENT (OUTPATIENT)
Age: 62
End: 2022-09-08

## 2022-09-08 VITALS
TEMPERATURE: 96.3 F | HEART RATE: 71 BPM | SYSTOLIC BLOOD PRESSURE: 120 MMHG | BODY MASS INDEX: 19.04 KG/M2 | DIASTOLIC BLOOD PRESSURE: 80 MMHG | OXYGEN SATURATION: 97 % | HEIGHT: 60 IN | WEIGHT: 97 LBS

## 2022-09-08 DIAGNOSIS — M19.042 PRIMARY OSTEOARTHRITIS, RIGHT HAND: ICD-10-CM

## 2022-09-08 DIAGNOSIS — M19.041 PRIMARY OSTEOARTHRITIS, RIGHT HAND: ICD-10-CM

## 2022-09-08 DIAGNOSIS — R80.9 PROTEINURIA, UNSPECIFIED: ICD-10-CM

## 2022-09-08 DIAGNOSIS — Z84.0 FAMILY HISTORY OF DISEASES OF THE SKIN AND SUBCUTANEOUS TISSUE: ICD-10-CM

## 2022-09-08 DIAGNOSIS — M81.0 AGE-RELATED OSTEOPOROSIS W/OUT CURRENT PATHOLOGICAL FRACTURE: ICD-10-CM

## 2022-09-08 PROCEDURE — 99205 OFFICE O/P NEW HI 60 MIN: CPT

## 2022-09-08 NOTE — PHYSICAL EXAM
[Cervical Lymph Nodes Enlarged Anterior Bilaterally] : anterior cervical [Supraclavicular Lymph Nodes Enlarged Bilaterally] : supraclavicular [No CVA Tenderness] : no ~M costovertebral angle tenderness [Motor Tone] : muscle strength and tone were normal [No Focal Deficits] : no focal deficits [Impaired Insight] : insight and judgment were intact [Mood] : the mood was normal [General Appearance - Alert] : alert [General Appearance - In No Acute Distress] : in no acute distress [Sclera] : the sclera and conjunctiva were normal [Extraocular Movements] : extraocular movements were intact [Outer Ear] : the ears and nose were normal in appearance [Neck Appearance] : the appearance of the neck was normal [] : no respiratory distress [Respiration, Rhythm And Depth] : normal respiratory rhythm and effort [Heart Rate And Rhythm] : heart rate was normal and rhythm regular [Heart Sounds] : normal S1 and S2 [Abdomen Soft] : soft [Abdomen Tenderness] : non-tender [FreeTextEntry1] : mild heberden nodes at DIPs; No synovitis or effusion on exam noted today; Good ROM in b/l shoulders, no pelvic/girdle stiffness and able to stand up without using her hands

## 2022-09-08 NOTE — HISTORY OF PRESENT ILLNESS
[FreeTextEntry1] : 62 y/o F here w/ hx of OA hands; +WILFRED 1:320 homogenous; reports of Raynaud's of the hands; and has Osteoporosis. \par Today she states she notes her hands turn purple in the cold air conditioning w/ Raynaud's lke symptoms. No ulcers. \par Denies any swelling or redness or warmth of any joints.\par States she notes intermittent LBP and more so since she lifted heavy stuff recently. States LBP worse w/ sitting; better w/ stretching or resting. Denies any loss of bladder or bowel incontinence or saddle anesthesias.\par Denies any fever/chills, no rashes, no ulcers, no dry eyes, no dry mouth, no infectious diarrhea or  symptoms at this time.\par States she has Osteoporosis on Dexa and taking Boniva monthly. States takes Ca+vit D supplementation. \par \par

## 2022-09-08 NOTE — ASSESSMENT
[FreeTextEntry1] : 62 y/o F here w/ hx of OA hands; +WILFRED 1:320 homogenous; reports of Raynaud's of the hands; and has Osteoporosis. \par -No synovitis or effusion on exam noted today and advised to monitor.\par \par Raynaud's: no ulcers and reports of color changes to white and purple in the hands w/ the cold. Discussed to keep hands warm with gloves and if not controlled can add Ca channel blocker if BP allows\par -will check autoimmune labs for it as below \par \par +WILFRED 1:320 homo: repeat WILFRED w/ IF was negative on labs 10/25/2017\par -Clinically with mainly symptoms of Raynaud's w/o much other clinical symptoms of CTD/lupus/ Sjogren syndrome at this time and educated on symptoms to monitor for if she evolved.\par -labs 10/25/2017 w/ recent WILFRED w/ IF negaitve; DS-DNA neg; C3/C4NL; Sm/RNP NL; RO/LA neg; RF/CCP neg; NL ESR; NL CRP; LAC neg; urine prot/creat WNL\par \par LBP: \par -referred for more recent L spine and SI xray for pain reported after lifting heavy to r/o compression fracture also and call for results please \par -xray LS spine 10/26/2017 in detail w/ multilevel degenerative disease and moderate scoliosis. Unremarkable SI joints.\par -Advil PRN w/ food \par \par Osteoporosis: on Dexa 1/7/21 w/ lowest t-score -2.6 at left femoral neck\par -Dexa referral given to do after 1/7/23 to monitor \par -on Boniva monthly tolerating well\par -continue Ca+vitD supplementation\par -encouraged weight bearing exercises as tolerated.\par \par -educated on symptoms to monitor for in detail and alert us if any concerns.\par -knows to stay up to date on health maintenance w/ PCP\par -f/u in 4 mo w/ labs, Dexa or sooner as needed \par \par  \par

## 2022-09-08 NOTE — REASON FOR VISIT
[Consultation] : a consultation visit [FreeTextEntry1] : Raynaud's of hands; hx of +WILFRED; Osteoporosis

## 2022-09-12 PROBLEM — R80.9 PROTEINURIA: Status: ACTIVE | Noted: 2022-09-12

## 2022-09-12 LAB
25(OH)D3 SERPL-MCNC: 75.3 NG/ML
ALBUMIN SERPL ELPH-MCNC: 4.8 G/DL
ALP BLD-CCNC: 80 U/L
ALT SERPL-CCNC: 24 U/L
ANA SER IF-ACNC: NEGATIVE
ANION GAP SERPL CALC-SCNC: 12 MMOL/L
ANION GAP SERPL CALC-SCNC: 13 MMOL/L
AST SERPL-CCNC: 28 U/L
BASOPHILS # BLD AUTO: 0.05 K/UL
BASOPHILS NFR BLD AUTO: 0.8 %
BILIRUB SERPL-MCNC: 0.3 MG/DL
BUN SERPL-MCNC: 11 MG/DL
BUN SERPL-MCNC: 11 MG/DL
C3 SERPL-MCNC: 100 MG/DL
C4 SERPL-MCNC: 24 MG/DL
CALCIUM SERPL-MCNC: 9.2 MG/DL
CALCIUM SERPL-MCNC: 9.3 MG/DL
CALCIUM SERPL-MCNC: 9.3 MG/DL
CARDIOLIPIN IGM SER-MCNC: 6.2 MPL
CARDIOLIPIN IGM SER-MCNC: <5 GPL
CCP AB SER IA-ACNC: <8 UNITS
CENTROMERE IGG SER-ACNC: <0.2 CD:130001892
CHLORIDE SERPL-SCNC: 103 MMOL/L
CHLORIDE SERPL-SCNC: 104 MMOL/L
CK SERPL-CCNC: 88 U/L
CO2 SERPL-SCNC: 25 MMOL/L
CO2 SERPL-SCNC: 26 MMOL/L
CREAT SERPL-MCNC: 0.88 MG/DL
CREAT SERPL-MCNC: 0.92 MG/DL
CREAT SPEC-SCNC: 22 MG/DL
CREAT/PROT UR: 0.5 RATIO
CRP SERPL-MCNC: <3 MG/L
DSDNA AB SER-ACNC: <12 IU/ML
EGFR: 70 ML/MIN/1.73M2
EGFR: 74 ML/MIN/1.73M2
ENA RNP AB SER IA-ACNC: <0.2 AL
ENA SCL70 IGG SER IA-ACNC: <0.2 AL
ENA SM AB SER IA-ACNC: <0.2 AL
ENA SS-A AB SER IA-ACNC: <0.2 AL
ENA SS-B AB SER IA-ACNC: <0.2 AL
EOSINOPHIL # BLD AUTO: 0.13 K/UL
EOSINOPHIL NFR BLD AUTO: 2.1 %
ERYTHROCYTE [SEDIMENTATION RATE] IN BLOOD BY WESTERGREN METHOD: 2 MM/HR
GLUCOSE SERPL-MCNC: 84 MG/DL
GLUCOSE SERPL-MCNC: 86 MG/DL
HCT VFR BLD CALC: 43.8 %
HGB BLD-MCNC: 14.8 G/DL
IMM GRANULOCYTES NFR BLD AUTO: 0.2 %
LYMPHOCYTES # BLD AUTO: 1.09 K/UL
LYMPHOCYTES NFR BLD AUTO: 17.7 %
MAN DIFF?: NORMAL
MCHC RBC-ENTMCNC: 31.7 PG
MCHC RBC-ENTMCNC: 33.8 GM/DL
MCV RBC AUTO: 93.8 FL
MONOCYTES # BLD AUTO: 0.58 K/UL
MONOCYTES NFR BLD AUTO: 9.4 %
NEUTROPHILS # BLD AUTO: 4.31 K/UL
NEUTROPHILS NFR BLD AUTO: 69.8 %
PARATHYROID HORMONE INTACT: 40 PG/ML
PLATELET # BLD AUTO: 346 K/UL
POTASSIUM SERPL-SCNC: 4.6 MMOL/L
POTASSIUM SERPL-SCNC: 4.6 MMOL/L
PROT SERPL-MCNC: 6.8 G/DL
PROT UR-MCNC: 11 MG/DL
RBC # BLD: 4.67 M/UL
RBC # FLD: 13.1 %
RF+CCP IGG SER-IMP: NEGATIVE
RHEUMATOID FACT SER QL: <10 IU/ML
SODIUM SERPL-SCNC: 140 MMOL/L
SODIUM SERPL-SCNC: 142 MMOL/L
TSH SERPL-ACNC: 0.62 UIU/ML
WBC # FLD AUTO: 6.17 K/UL

## 2022-09-13 LAB
B2 GLYCOPROT1 IGG SER-ACNC: <5 SGU
B2 GLYCOPROT1 IGM SER-ACNC: <5 SMU

## 2022-09-19 LAB — HLA-B27 RELATED AG QL: NEGATIVE

## 2022-10-12 ENCOUNTER — FORM ENCOUNTER (OUTPATIENT)
Age: 62
End: 2022-10-12

## 2022-10-29 ENCOUNTER — OUTPATIENT (OUTPATIENT)
Dept: OUTPATIENT SERVICES | Facility: HOSPITAL | Age: 62
LOS: 1 days | End: 2022-10-29
Payer: COMMERCIAL

## 2022-10-29 ENCOUNTER — APPOINTMENT (OUTPATIENT)
Dept: ULTRASOUND IMAGING | Facility: CLINIC | Age: 62
End: 2022-10-29

## 2022-10-29 DIAGNOSIS — R80.9 PROTEINURIA, UNSPECIFIED: ICD-10-CM

## 2022-10-29 PROCEDURE — 76770 US EXAM ABDO BACK WALL COMP: CPT | Mod: 26

## 2022-10-29 PROCEDURE — 76770 US EXAM ABDO BACK WALL COMP: CPT

## 2022-11-07 ENCOUNTER — FORM ENCOUNTER (OUTPATIENT)
Age: 62
End: 2022-11-07

## 2022-11-08 VITALS
DIASTOLIC BLOOD PRESSURE: 86 MMHG | WEIGHT: 96.5 LBS | SYSTOLIC BLOOD PRESSURE: 122 MMHG | TEMPERATURE: 96.7 F | HEIGHT: 60 IN | BODY MASS INDEX: 18.94 KG/M2

## 2022-11-29 ENCOUNTER — APPOINTMENT (OUTPATIENT)
Dept: OBGYN | Facility: CLINIC | Age: 62
End: 2022-11-29

## 2022-12-19 ENCOUNTER — RESULT CHARGE (OUTPATIENT)
Age: 62
End: 2022-12-19

## 2022-12-19 ENCOUNTER — APPOINTMENT (OUTPATIENT)
Dept: OBGYN | Facility: CLINIC | Age: 62
End: 2022-12-19

## 2022-12-19 VITALS — HEART RATE: 82 BPM | SYSTOLIC BLOOD PRESSURE: 137 MMHG | DIASTOLIC BLOOD PRESSURE: 93 MMHG

## 2022-12-19 DIAGNOSIS — R92.2 INCONCLUSIVE MAMMOGRAM: ICD-10-CM

## 2022-12-19 LAB
BILIRUB UR QL STRIP: NORMAL
CLARITY UR: CLEAR
COLLECTION METHOD: NORMAL
GLUCOSE UR-MCNC: NORMAL
HCG UR QL: 0.2 EU/DL
HEMOGLOBIN: 14.7
HGB UR QL STRIP.AUTO: NORMAL
KETONES UR-MCNC: NORMAL
LEUKOCYTE ESTERASE UR QL STRIP: NORMAL
NITRITE UR QL STRIP: NORMAL
PH UR STRIP: 7
PROT UR STRIP-MCNC: NORMAL
SP GR UR STRIP: 1.02

## 2022-12-19 PROCEDURE — 99396 PREV VISIT EST AGE 40-64: CPT

## 2022-12-19 PROCEDURE — 85018 HEMOGLOBIN: CPT | Mod: QW

## 2022-12-19 PROCEDURE — 82270 OCCULT BLOOD FECES: CPT

## 2022-12-19 PROCEDURE — 81003 URINALYSIS AUTO W/O SCOPE: CPT | Mod: QW

## 2022-12-19 NOTE — PHYSICAL EXAM
[Chaperone Present] : A chaperone was present in the examining room during all aspects of the physical examination [Appropriately responsive] : appropriately responsive [Alert] : alert [No Lymphadenopathy] : no lymphadenopathy [Soft] : soft [Non-tender] : non-tender [Oriented x3] : oriented x3 [Examination Of The Breasts] : a normal appearance [No Discharge] : no discharge [No Masses] : no breast masses were palpable [Labia Majora] : normal [Labia Minora] : normal [Normal] : normal [Uterine Adnexae] : normal [Normal rectal exam] : was normal [FreeTextEntry1] : Yvrose CORRIGAN-FORREST chaperoned during entire physical exam [Occult Blood Positive] : was negative for occult blood analysis

## 2022-12-19 NOTE — HISTORY OF PRESENT ILLNESS
[TextBox_4] : Patient is a 63 y/o female here today for annual visit. \par She is on boniva for osteoporosis tolerating well. \par Her mother was just dx with breast CA at 83. \par She denies any GYN complaints at this time

## 2022-12-19 NOTE — PLAN
[FreeTextEntry1] : \par Patient to follow up in 1 year for annual GYN exam\par Mammogram and bilateral breast US due:  now Rx given \par Colonoscopy due:   1/2026\par Bone density due: now \par Pap ordered\par \par Hemoccult ordered \par All questions answered, patient agreeable with plan.\par \par \par \par I Yvrose CORRIGAN-C am scribing for the presence of Dr. Rodriguez the following sections HISTORY OF PRESENT ILLNESS, PAST MEDICAL/FAMILY/SOCIAL HISTORY; REVIEW OF SYSTEMS; VITAL SIGNS; PHYSICAL EXAM; DISPOSITION. \par \par \par I personally performed the services described in the documentation, reviewed the documentation recorded by the scribe in my presence and it accurately and completely records my words and actions.\par

## 2022-12-29 ENCOUNTER — FORM ENCOUNTER (OUTPATIENT)
Age: 62
End: 2022-12-29

## 2023-01-04 LAB — CYTOLOGY CVX/VAG DOC THIN PREP: ABNORMAL

## 2023-01-10 ENCOUNTER — APPOINTMENT (OUTPATIENT)
Dept: RADIOLOGY | Facility: CLINIC | Age: 63
End: 2023-01-10
Payer: COMMERCIAL

## 2023-01-10 ENCOUNTER — OUTPATIENT (OUTPATIENT)
Dept: OUTPATIENT SERVICES | Facility: HOSPITAL | Age: 63
LOS: 1 days | End: 2023-01-10
Payer: COMMERCIAL

## 2023-01-10 DIAGNOSIS — Z00.00 ENCOUNTER FOR GENERAL ADULT MEDICAL EXAMINATION WITHOUT ABNORMAL FINDINGS: ICD-10-CM

## 2023-01-10 PROCEDURE — 77080 DXA BONE DENSITY AXIAL: CPT | Mod: 26

## 2023-01-10 PROCEDURE — 77080 DXA BONE DENSITY AXIAL: CPT

## 2023-01-14 ENCOUNTER — APPOINTMENT (OUTPATIENT)
Dept: ULTRASOUND IMAGING | Facility: CLINIC | Age: 63
End: 2023-01-14
Payer: COMMERCIAL

## 2023-01-14 ENCOUNTER — OUTPATIENT (OUTPATIENT)
Dept: OUTPATIENT SERVICES | Facility: HOSPITAL | Age: 63
LOS: 1 days | End: 2023-01-14
Payer: COMMERCIAL

## 2023-01-14 ENCOUNTER — APPOINTMENT (OUTPATIENT)
Dept: MAMMOGRAPHY | Facility: CLINIC | Age: 63
End: 2023-01-14
Payer: COMMERCIAL

## 2023-01-14 ENCOUNTER — RESULT REVIEW (OUTPATIENT)
Age: 63
End: 2023-01-14

## 2023-01-14 DIAGNOSIS — Z00.00 ENCOUNTER FOR GENERAL ADULT MEDICAL EXAMINATION WITHOUT ABNORMAL FINDINGS: ICD-10-CM

## 2023-01-14 DIAGNOSIS — Z00.8 ENCOUNTER FOR OTHER GENERAL EXAMINATION: ICD-10-CM

## 2023-01-14 PROCEDURE — 76641 ULTRASOUND BREAST COMPLETE: CPT

## 2023-01-14 PROCEDURE — 76641 ULTRASOUND BREAST COMPLETE: CPT | Mod: 26,50

## 2023-01-14 PROCEDURE — 77063 BREAST TOMOSYNTHESIS BI: CPT | Mod: 26

## 2023-01-14 PROCEDURE — 77067 SCR MAMMO BI INCL CAD: CPT

## 2023-01-14 PROCEDURE — 77067 SCR MAMMO BI INCL CAD: CPT | Mod: 26

## 2023-01-14 PROCEDURE — 77063 BREAST TOMOSYNTHESIS BI: CPT

## 2023-03-01 ENCOUNTER — APPOINTMENT (OUTPATIENT)
Dept: ORTHOPEDIC SURGERY | Facility: CLINIC | Age: 63
End: 2023-03-01
Payer: COMMERCIAL

## 2023-03-01 VITALS
HEIGHT: 60 IN | BODY MASS INDEX: 19.24 KG/M2 | WEIGHT: 98 LBS | HEART RATE: 79 BPM | DIASTOLIC BLOOD PRESSURE: 97 MMHG | SYSTOLIC BLOOD PRESSURE: 146 MMHG

## 2023-03-01 DIAGNOSIS — M19.012 PRIMARY OSTEOARTHRITIS, LEFT SHOULDER: ICD-10-CM

## 2023-03-01 DIAGNOSIS — M75.42 IMPINGEMENT SYNDROME OF LEFT SHOULDER: ICD-10-CM

## 2023-03-01 PROCEDURE — 73030 X-RAY EXAM OF SHOULDER: CPT | Mod: LT

## 2023-03-01 PROCEDURE — 99203 OFFICE O/P NEW LOW 30 MIN: CPT

## 2023-03-02 PROBLEM — M19.012 ARTHRITIS OF LEFT ACROMIOCLAVICULAR JOINT: Status: ACTIVE | Noted: 2023-03-02

## 2023-03-02 PROBLEM — M75.42 IMPINGEMENT SYNDROME OF LEFT SHOULDER: Status: ACTIVE | Noted: 2023-03-01

## 2023-03-02 NOTE — CONSULT LETTER
[Dear  ___] : Dear  [unfilled], [Consult Letter:] : I had the pleasure of evaluating your patient, [unfilled]. [Please see my note below.] : Please see my note below. [Consult Closing:] : Thank you very much for allowing me to participate in the care of this patient.  If you have any questions, please do not hesitate to contact me. [Sincerely,] : Sincerely, [FreeTextEntry3] : Rommel Campos III, MD \par NURY/keerthi

## 2023-03-02 NOTE — HISTORY OF PRESENT ILLNESS
[5] : a current pain level of 5/10 [8] : the relief from medicine is 8/10 [de-identified] : Jackeline comes in today with complaints of pain to her left shoulder.  She had a history many years ago having both rotator cuffs done.  She is starting to feel some increasing complaints of pain.  This injury is not work related or due to an automobile accident.  The patient states the pain is intermittent.  The patient describes the pain as sharp, shooting and stabbing.  The patient states rest and Motrin make the symptoms better.   [] : No [de-identified] : Motrin

## 2023-03-02 NOTE — ADDENDUM
[FreeTextEntry1] : This note was written by Rose Amador on 03/02/2023 acting as scribe for Rommel Campos III, MD

## 2023-03-02 NOTE — PHYSICAL EXAM
[de-identified] : Right shoulder:\par Shoulder: Range of Motion in Degrees:   	\par    	                        Claimant:          Normal:  	\par Abduction (Active)  	180  	180 degrees  	\par Abduction (Passive)  	180  	180 degrees  	\par Forward elevation (Active):  	180  	180 degrees  	\par Forward elevation (Passive):  180  	180 degrees  	\par External rotation (Active):  	45  	45 degrees  	\par External rotation (Passive):  	45  	45 degrees  	\par Internal rotation (Active):  	L-1  	L-1  	\par Internal rotation (Passive):  	L-1  	L-1  	\par \par No motor weakness to internal rotation, external rotation or abduction in the scapular plane.  Negative crank test.  Negative O’Antwon’s test.  Negative Speed’s test. Negative Yergason’s test.  Negative cross arm test.  No tenderness to palpation at the AC joint. Negative Hawkin’s sign.  Negative Neer’s sign.  Negative apprehension. Negative sulcus sign.  No gross neurological or vascular deficits distally.  Skin is intact.  No rashes, scars or lesions.  2+ radial and ulnar pulses. No extra-articular swelling or tenderness. \par \par Left shoulder:\par Shoulder: Range of Motion in Degrees:	\par 	                                  Claimant:	Normal:	\par Abduction (Active)	                  180	               180 degrees	\par Abduction (Passive)	  180	               180 degrees	\par Forward elevation (Active):	  180	               180 degrees	\par Forward elevation (Passive):	  180	               180 degrees	\par External rotation (Active):	   45	               45 degrees	\par External rotation (Passive):	   45	               45 degrees	\par Internal rotation (Active):	   L-1	               L-1	\par Internal rotation (Passive):	   L-1	               L-1	\par  \par No motor weakness to internal rotation, external rotation or abduction in the scapular plane.  Negative crank test.  Negative O’Antwon’s test.  Negative Speed’s test. Negative Yergason’s test. Positive cross arm test.  Positive tenderness to palpation over the AC joint. Positive Hawkin’s sign.  Positive Neer’s sign. Negative apprehension. Negative sulcus sign.  No gross neurological or vascular deficits distally.  Skin is intact.  No rashes, scars or lesions. 2+ radial and ulnar pulses. No extra-articular swelling or tenderness.\par  \par   [de-identified] : Gait: Normal    Station: Normal  [de-identified] : Appearance: Well-developed, well-nourished female  in no acute distress.  [de-identified] : Radiographs taken in the office today, two views of the left shoulder, show degenerative change of the AC joint.

## 2023-03-02 NOTE — DISCUSSION/SUMMARY
[de-identified] : The patient presents with impingement and AC arthritis, left shoulder.  At this time start on a course of physical therapy and topical anti-inflammatory.  She will be reassessed in four weeks.  We will discuss the potential for further intervention, including injection.

## 2023-03-07 ENCOUNTER — NON-APPOINTMENT (OUTPATIENT)
Age: 63
End: 2023-03-07

## 2023-03-10 ENCOUNTER — APPOINTMENT (OUTPATIENT)
Dept: DERMATOLOGY | Facility: CLINIC | Age: 63
End: 2023-03-10
Payer: COMMERCIAL

## 2023-03-10 ENCOUNTER — APPOINTMENT (OUTPATIENT)
Dept: NEUROSURGERY | Facility: CLINIC | Age: 63
End: 2023-03-10
Payer: COMMERCIAL

## 2023-03-10 VITALS
HEIGHT: 60 IN | SYSTOLIC BLOOD PRESSURE: 138 MMHG | BODY MASS INDEX: 19.24 KG/M2 | WEIGHT: 98 LBS | TEMPERATURE: 97.2 F | OXYGEN SATURATION: 97 % | DIASTOLIC BLOOD PRESSURE: 97 MMHG | HEART RATE: 82 BPM

## 2023-03-10 PROCEDURE — 99214 OFFICE O/P EST MOD 30 MIN: CPT

## 2023-03-10 PROCEDURE — 99203 OFFICE O/P NEW LOW 30 MIN: CPT

## 2023-03-10 NOTE — HISTORY OF PRESENT ILLNESS
INR ([id number]) ORDER HAS  PLEASE PLACE NEW STANDING ORDER GOOD FOR 1 YEAR.      THANK YOU  
[de-identified] : The patient has been fit in for urgent appointment.\par c/o persistent problems on R hand nails; \par took terbinafine with good results

## 2023-03-10 NOTE — PHYSICAL EXAM
[FreeTextEntry3] : R hand;\par 4th finger;  nail base NL, with distal onycholysis/breakage;\par \par also:  lysis of thumb nail with debris\par \par Erythematous scaling patches with inflammation on hands

## 2023-03-10 NOTE — CONSULT LETTER
[Dear  ___] : Dear  [unfilled], [Courtesy Letter:] : I had the pleasure of seeing your patient, [unfilled], in my office today. [Sincerely,] : Sincerely, [FreeTextEntry1] : Jackeline Ying is a 62-year-old female with a history of osteoporosis who presents today with lumbar symptoms.  Patient with a history of chronic lower back pain however her leg symptoms started approximately 1 month ago and has worsened within the past 2 weeks.  Patient does not recall a specific event which may have triggered her symptoms.  She reports 8 out of 10 throbbing constant lower back pain.  She reports a shooting pain in the right lateral thigh primarily only when getting out of bed in the morning.  This resolves throughout the day.  She denies lower extremity numbness,, tingling or weakness.  Denies walking difficulties bowel or bladder dysfunction or saddle anesthesia.  Motrin provides her with minimal benefit.  Tylenol provides her with no benefit.  Patient uses Salonpas with some benefit.\par \par There is no imaging to review with the patient.\par \par Patient is alert and oriented.  No distress noted.  Strength to bilateral legs 5/5.  Negative clonus.  2+ reflexes to lower extremity bilaterally.  Negative straight leg test.  Patient is walking without difficulties.\par \par I provided the patient with a prescription for an x-ray and MRI of the lumbar spine.  Patient declining any medications at this time.  Patient would like to hold off on physical therapy until we obtain MRI images.  Patient will call office once images are available at which time we will discuss the results over the phone and the following steps in the treatment plan.  Patient aware to call with any further questions or concerns or with any new or worsening symptoms. [FreeTextEntry2] : Sophia Wyhte MD\par [address]\par [place] [address]\par  [FreeTextEntry3] : Julia Zambrano, MSN, [facility] Department of Neurosurgery  [address], [address] [place] [address] Office: [phone] Fax: [phone]

## 2023-03-10 NOTE — ASSESSMENT
[FreeTextEntry1] : Nail dystrophy;\par onycho appears gone;\par ? traumatic;\par \par Therapeutic options and their risks and benefits; along with multiple diagnostic possibilities were discussed at length; risks and benefits of further study were discussed;\par \par tacrolimus ointment BID;\par use qd under advanced healing q3 days for R 4th finger;  trim thumb;\par may need to address habit tic, nail manipulation?\par \par recheck at upcoming TBSE

## 2023-03-25 ENCOUNTER — OUTPATIENT (OUTPATIENT)
Dept: OUTPATIENT SERVICES | Facility: HOSPITAL | Age: 63
LOS: 1 days | End: 2023-03-25
Payer: COMMERCIAL

## 2023-03-25 ENCOUNTER — APPOINTMENT (OUTPATIENT)
Dept: MRI IMAGING | Facility: CLINIC | Age: 63
End: 2023-03-25
Payer: COMMERCIAL

## 2023-03-25 ENCOUNTER — APPOINTMENT (OUTPATIENT)
Dept: RADIOLOGY | Facility: CLINIC | Age: 63
End: 2023-03-25
Payer: COMMERCIAL

## 2023-03-25 DIAGNOSIS — M54.50 LOW BACK PAIN, UNSPECIFIED: ICD-10-CM

## 2023-03-25 PROCEDURE — 72110 X-RAY EXAM L-2 SPINE 4/>VWS: CPT

## 2023-03-25 PROCEDURE — 72110 X-RAY EXAM L-2 SPINE 4/>VWS: CPT | Mod: 26

## 2023-03-25 PROCEDURE — 72148 MRI LUMBAR SPINE W/O DYE: CPT | Mod: 26

## 2023-03-25 PROCEDURE — 72148 MRI LUMBAR SPINE W/O DYE: CPT

## 2023-04-03 ENCOUNTER — APPOINTMENT (OUTPATIENT)
Dept: RADIOLOGY | Facility: CLINIC | Age: 63
End: 2023-04-03
Payer: COMMERCIAL

## 2023-04-03 ENCOUNTER — OUTPATIENT (OUTPATIENT)
Dept: OUTPATIENT SERVICES | Facility: HOSPITAL | Age: 63
LOS: 1 days | End: 2023-04-03
Payer: COMMERCIAL

## 2023-04-03 DIAGNOSIS — M54.16 RADICULOPATHY, LUMBAR REGION: ICD-10-CM

## 2023-04-03 DIAGNOSIS — M54.50 LOW BACK PAIN, UNSPECIFIED: ICD-10-CM

## 2023-04-03 PROCEDURE — 72082 X-RAY EXAM ENTIRE SPI 2/3 VW: CPT

## 2023-04-03 PROCEDURE — 72082 X-RAY EXAM ENTIRE SPI 2/3 VW: CPT | Mod: 26

## 2023-04-13 ENCOUNTER — APPOINTMENT (OUTPATIENT)
Dept: NEUROSURGERY | Facility: CLINIC | Age: 63
End: 2023-04-13
Payer: COMMERCIAL

## 2023-04-13 PROCEDURE — 99215 OFFICE O/P EST HI 40 MIN: CPT

## 2023-04-18 NOTE — CONSULT LETTER
[Dear  ___] : Dear  [unfilled], [Courtesy Letter:] : I had the pleasure of seeing your patient, [unfilled], in my office today. [Sincerely,] : Sincerely, [FreeTextEntry2] : Sophia Whyte  [address] [place] [id number]  [FreeTextEntry1] : Mrs. Ying is a very pleasant 62-year-old female patient who was seen in our office today in follow-up to review her imaging findings.  The patient was previously assessed by our nurse practitioner in regards to low back pain with right-sided leg pain and organized advanced imaging which was reviewed at this time.\par \par Briefly, the patient continues to complain of right-sided leg pain which is usually worse in the morning and lasts approximately an hour or 2 before it resolves.  The patient's pain is significantly better with activity and is usually less bothersome during the day.  The patient's pain radiates from the back down her entire leg into the ankle.  The patient denies any significant sensory changes associated with this pain.  The patient has been using Advil and Salonpas with some benefit.\par \par On examination, the patient is alert, oriented, and compliant with the exam.  The patient demonstrates full strength in the upper and lower extremities bilaterally.  The patient ambulates well.  The patient currently does not have pain.\par \par The patient is accompanied with several images including an MRI scan of the lumbar spine dated March 25, 2023.  These images demonstrate a degenerative lumbar scoliosis with lateral listhesis at L2/3 L3/4 and L4/5. These images also demonstrate a levoscoliosis measuring approximately 30 degrees from L2-L5 with the apex at L3.Severe foraminal stenosis is noted on the right from L2-L5 and to a lesser degree at L5-S1 on the left.  The patient is also accompanied with the flexion/extension x-rays demonstrating arthritic changes as expected with her degenerative scoliosis.  The patient standing Pineda angle measures approximately 32 degrees.  The patient's lateral listhesis at L4/5 appears worse on these images but they are difficult to compare to the patient's MRI scans which were performed supine.  I do not have older images to compare to.  Finally, the patient is accompanied with standing scoliosis films performed on April 3, 2023.  These images again demonstrate the patient's degenerative scoliosis in the lumbar spine with an accommodated thoracic dextroscoliosis. The patient's sagittal and coronal alignment appear within normal limits despite these issues.  The patient has loss of cervical lordosis noted on these images.\par \par Taken together, the patient has a clinical history and radiographic findings most consistent with a lumbar radiculopathy as a result of the patient's degenerative scoliosis in the lumbar spine.  At this time, the patient's symptoms are intermittent and do resolve over the course of the day but remain quite debilitating for the patient especially early in the morning.  At this time, there may be an arthritic component causing a nerve root irritation based on the patient's history.  We went over several options with regards to pain management including conservative therapies such as acupuncture, massage therapy, and physical therapy.  The patient has attempted physical therapy in the remote past and is willing to try physical therapy again at this time.  I have provided the patient a prescription specifically for the Schroth method of physical therapy for scoliosis.  In terms of medications, the patient has been taking Advil with some relief and I provided the patient a prescription for naproxen to be taken for severe pain.  The patient has been instructed not to take this in conjunction with Advil or other NSAIDs.  The patient has also been counseled to take these medications with food if possible.  Finally, we did have a conversation about the role of surgery in the patient's particular context.  At this time, the patient's pain resides primarily in the lower extremity on the right and thus a focal fusion and decompression may provide the patient with adequate relief in this regard.  However, given the patient's scoliotic deformity, the patient runs a high risk of progressive degenerative changes elsewhere in the spine should she elect a surgical route.  At this time, the patient would like to defer further surgical discussion until she has had a chance to attempt conservative therapy.  The patient will be following up with us in a few weeks to reevaluate her progress. [FreeTextEntry3] : Luc Coleman MD, PhD, CS, FAANS Attending Neurosurgeon  of Neurosurgery [employer] [facility] [employer] at [facility] at [address], [place] [address] Office: [phone] Fax: [phone]

## 2023-05-09 ENCOUNTER — APPOINTMENT (OUTPATIENT)
Dept: NEUROSURGERY | Facility: CLINIC | Age: 63
End: 2023-05-09
Payer: COMMERCIAL

## 2023-05-09 VITALS
DIASTOLIC BLOOD PRESSURE: 84 MMHG | HEIGHT: 60 IN | OXYGEN SATURATION: 100 % | WEIGHT: 98 LBS | HEART RATE: 70 BPM | BODY MASS INDEX: 19.24 KG/M2 | SYSTOLIC BLOOD PRESSURE: 149 MMHG

## 2023-05-09 PROCEDURE — 99215 OFFICE O/P EST HI 40 MIN: CPT

## 2023-05-11 NOTE — CONSULT LETTER
[Dear  ___] : Dear  [unfilled], [Courtesy Letter:] : I had the pleasure of seeing your patient, [unfilled], in my office today. [Sincerely,] : Sincerely, [FreeTextEntry2] : Sophia Whyte MD\par [address]\par [place] [address]\par  [FreeTextEntry1] : Mrs. Ying is a very pleasant 62-year-old female patient who was seen in our office today in follow-up.  The patient has a known scoliotic deformity with right-sided radicular symptoms.\par \par Unfortunately, the patient is not doing well with conservative therapy at this time.  The patient states that her leg pain is worsening and her attempted physical therapy are not providing significant relief.  The patient believes that she may be overexerting herself by trying to participate in physical therapy before a long day at work.  The patient's symptoms have not changed in character or location and continues to radiate down the entirety of her right leg without any specific numbness/or tingling.  The patient does not endorse any new bowel or bladder symptoms.  The patient's pain continues to be worst in the mornings and improves with activity.\par \par On examination, the patient remains alert, oriented, and compliant with the exam.  The patient demonstrates full strength with the lower extremities bilaterally.  The patient ambulates well.  The patient does not have any significant coronal or sagittal imbalance on inspection.\par \par I have no new imaging to review today.  However, the patient's previous images of the lumbar spine performed on March 25, 2023 demonstrates a lateral listhesis and a lumbar scoliosis at L2-L5.  Nerve root compression is likely at the neuroforamen at these levels on the right.  Patient's Pineda angle measures approximately 32 degrees.  The patient additionally has a thoracic dextroscoliosis in addition to her lumbar levoscoliosis.\par \par Taken together, the patient has a clinical history and radiographic findings still consistent with a lumbar radiculopathy.  At this time, given failure of her current physical therapy regimen, I have recommended attempting to reschedule her physical therapy at a more convenient time possibly after work.  The patient has also been offered medications including a Medrol Dosepak and Lyrica for symptomatic management as well as referral to a pain management doctor for the possibility of injections.  We did spend some time talking about the role of surgical intervention should her pain not resolve but this would likely involve a long segment decompression and fusion given the patient's degree of scoliotic deformity.  Lateral bend x-rays would also be required to determine the flexibility of the patient's thoracic curve.  At this time, the patient would like to continue to attempt conservative therapies which I think is very reasonable.  The patient will be following up with us in a few weeks to reevaluate her progress. [FreeTextEntry3] : Luc Coleman MD, PhD, FRCPSC \par Attending Neurosurgeon \par [facility] \par [address], [address]nd floor \[place] [address] \par Office: [phone] \par Fax: [phone]\par \par

## 2023-05-16 ENCOUNTER — APPOINTMENT (OUTPATIENT)
Dept: PHYSICAL MEDICINE AND REHAB | Facility: CLINIC | Age: 63
End: 2023-05-16
Payer: COMMERCIAL

## 2023-05-16 ENCOUNTER — APPOINTMENT (OUTPATIENT)
Dept: NEPHROLOGY | Facility: CLINIC | Age: 63
End: 2023-05-16

## 2023-05-16 VITALS
HEIGHT: 60 IN | OXYGEN SATURATION: 100 % | DIASTOLIC BLOOD PRESSURE: 95 MMHG | BODY MASS INDEX: 19.24 KG/M2 | SYSTOLIC BLOOD PRESSURE: 164 MMHG | WEIGHT: 98 LBS | HEART RATE: 78 BPM

## 2023-05-16 PROCEDURE — 99204 OFFICE O/P NEW MOD 45 MIN: CPT | Mod: GC

## 2023-05-16 NOTE — PHYSICAL EXAM
[FreeTextEntry1] : PE:\par Constitutional: In NAD, calm and cooperative\par MSK (Back)\par 	Inspection: no gross swelling identified, scoliotic curve seen\par 	Palpation: no significant tenderness and palpation \par 	ROM: AROM of L Spine intact\par 	Strength: 5/5 strength in bilateral lower extremities\par 	Reflexes: 2+ Patella reflex on L, 1+ patella reflex on R,  2+ Achilles reflex bilaterally, negative clonus bilaterally\par 	Sensation: Intact to light touch in bilateral lower extremities\par Special tests:\par SLR: Negative \par RITA: Negative \par FADIR: Negative \par Facet loading: Negative

## 2023-05-16 NOTE — HISTORY OF PRESENT ILLNESS
[FreeTextEntry1] : Ms. KIM ROLON is a 62 year old female who presents with low back pain with sharp, shooting pain down the RLE. Patient also describes intermittent pins and needles in the RLE calf. \par \par Location: low back pain \par Onset: this morning, had PT session yesterday \par Provocation/Palliative: constant pain, no movements/intervention has been exacerbating pain \par Quality: Sharp and shooting, pins and needles in the calf \par Radiation:Down RLE, down anterior/lateral side of the leg to the ankle \par Severity: Can be 10/10\par Timing: Comes and goes\par \par Denies any associated numbness. Denies any associated leg weakness. Denies any loss of bowel/bladder control or any groin numbness.\par Previous medications trialed: finished Medrol dose pack, naproxen, lyrica 50mg BID for 1 week with mild relief\par Previous procedures relevant to complaint: none\par Conservative therapy tried?: Physical therapy twice a week, 6 sessions for a total of 3 weeks\par

## 2023-05-16 NOTE — ASSESSMENT
[FreeTextEntry1] : Ms. KIM ROLON is a 62 year old female who presents with a significant history of scoliosis presents for lumbar radicular pain likely due to underlying stenosis from the scoliosis and listhesis. Denies any red flag signs. Will recommend:\par - MRI L Spine reviewed\par - Discussed with patient the risks (including but not limited to bleeding, infection, nerve damage, etc), benefits and alternatives to an epidural steroid injection for which patient understands. Will plan on a caudal ANNA. \par - Methocarbamol 500 mg Qhs PRN. Advised of side effects including sedation. \par - Patient will continue Lyrica via other physician\par \par \par Return for procedure. Patient aware of red flag signs including any changes to their bowel/bladder control, groin numbness or new weakness. Patient knows to seek immediate attention by calling 911 or going to nearest ER if these symptoms appear.

## 2023-05-16 NOTE — DATA REVIEWED
[FreeTextEntry1] : MR L Spine 3/25/23 reviewed by me: moderate levoscoliosis, L4-5 grade 1/2 left lateral listhesis seen\par \par  MR Lumbar Spine No Cont             Final\par \par No Documents Attached\par \par \par \par \par   EXAM: [id number] - MR SPINE LUMBAR  - ORDERED BY: KEV HWANG\par \par \par PROCEDURE DATE:  03/25/2023\par \par \par \par INTERPRETATION:  LUMBAR SPINE MRI\par \par CLINICAL INFORMATION: Chronic pain radiating to the right thigh. Status post strain injury. No prior surgery.\par \par TECHNIQUE: Multiplanar, multisequence MRI was obtained of the lumbar spine.\par \par COMPARISON: Radiographs performed on same date and 10/26/2017. No prior MRI available.\par \par FINDINGS:\par \par OSSEOUS: Moderate levoscoliosis with apex at L3. Grade 1 right lateral listhesis at T12/L1. Grade 1/2 left lateral listhesis and grade 1 anterolisthesis at L4-L5 with likely bilateral pars interarticularis defects at L4. Subtle grade 1 anterolisthesis at L3-L4 and L5-S1 with likely unilateral left-sided pars interarticularis defects at L3 and L5. Mixed Modic endplate changes most advanced at L3-L4 and L4-L5. No acute fracture or aggressive osseous neoplasm.\par \par LEVEL BY LEVEL ANALYSIS:\par \par T12-L1: Mild annular disc bulging. Otherwise no significant abnormality.\par \par L1-L2: Mild annular disc bulging and mild to moderate right worse than left facet arthrosis. Otherwise no significant abnormality.\par \par L2-L3: Annular disc bulging and moderate facet arthrosis contribute to mild spinal canal stenosis and mild right-sided neural canal stenosis. Left neural canal is adequate.\par \par L3-L4: Right posterior paracentral predominant disc bulging versus small protrusion, severe facet arthrosis, and right worse than left ligamentum flavum thickening/buckling contribute to moderately severe spinal canal stenosis with effacement of the right lateral recess and likely impingement of the descending ipsilateral L4 nerve root. Moderate right-sided neural canal stenosis. Left neural canal is adequate.\par \par L4-L5: Broad-based posterior disc pseudobulge and severe facet arthrosis contribute to moderate spinal canal stenosis with near complete effacement of the right lateral recess and possible impingement of the descending ipsilateral L5 nerve root. Mild to moderate right-sided neural canal stenosis. Left neural canal is adequate.\par \par L5-S1: Shallow posterior disc osteophyte complex and severe bilateral facet arthrosis contribute to mild spinal canal stenosis and mild to moderate right worse than left neural canal stenosis.\par \par GENERAL: Conus medullaris tip is anatomic in positioning. No intradural or extradural lesion.\par \par IMPRESSION:\par 1.  Moderate lumbar levoscoliosis with superimposed advanced lower lumbar spine discogenic spondyloarthropathy.\par 2.  At L3-L4 subtle grade 1 anterolisthesis, right posterior paracentral predominant disc bulging versus small protrusion, severe facet arthrosis, and right worse than left ligamentum flavum thickening contribute to moderately severe spinal canal and right lateral recess stenosis with likely impingement of descending ipsilateral L4 nerve root. Correlate for right-sided L4 radiculopathy.\par 3.  At L4-L5 grade 1/2 left lateral listhesis, grade 1 anterolisthesis, and severe facet arthrosis contribute to moderate spinal canal and right lateral recess stenosis with possible impingement of the descending ipsilateral L5 nerve root. Correlate for possible right-sided L5 radiculopathy.\par 4.  At L5-S1 severe bilateral facet arthrosis.\par \par ADDITIONAL CLINICAL INFORMATION: Low back muscle strain S39.012A\par \par --- End of Report ---\par \par \par \par \par \par \par JACK PIRES MD; Attending Radiologist\par This document has been electronically signed. Mar 29 2023  1:03PM\par \par  \par \par  Ordered by: KEV HWANG       Collected/Examined: 25Mar2023 08:01AM       \par Verification Required       Stage: Final       \par  Performed at: [facility] [place]       Resulted: 29Mar2023 12:41PM       Last Updated: 29Mar2023 01:06PM       Accession: [id number]

## 2023-05-18 ENCOUNTER — NON-APPOINTMENT (OUTPATIENT)
Age: 63
End: 2023-05-18

## 2023-05-23 ENCOUNTER — APPOINTMENT (OUTPATIENT)
Dept: PHYSICAL MEDICINE AND REHAB | Facility: AMBULATORY MEDICAL SERVICES | Age: 63
End: 2023-05-23
Payer: COMMERCIAL

## 2023-05-23 PROCEDURE — 62323 NJX INTERLAMINAR LMBR/SAC: CPT

## 2023-05-25 NOTE — PROCEDURE
[de-identified] : Caudal Epidural Steroid Injection Under Fluoroscopic Guidance\par \par  \par Attending: Armando Walls DO\par \par PREOPERATIVE DIAGNOSIS: Lumbar Stenosis\par POSTOPERATIVE DIAGNOSIS: same\par \par SEDATION: As per Anesthesia\par  \par PROCEDURE PERFORMED: Caudal Epidural Steroid Injection\par  \par ESTIMATED BLOOD LOSS: None\par FLUOROSCOPY WAS USED.\par \par  \par PROCEDURE AND FINDINGS: \par Patient was greeted in the pre-procedure holding area. The risk, benefits and alternatives to the procedure were again reviewed with the patient and written informed consent was placed in the chart. They were taken to the procedure room and positioned prone on the fluoroscopy table. Prior to the procedure a time out was completed, verifying correct patient, procedure, and site. \par  \par An AP fluoroscopic  film was taken to identify the sacral hiatus. A lateral film was then obtained. The skin was prepped with chlorhexidine and draped in the usual sterile fashion. The skin and subcutaneous tissue overlying the aforementioned anatomic targets were anesthetized using a 27-gauge 1-1/2 inch needle with 1% preservative-free lidocaine for a total volume of 3 mls. \par  \par Then a 22-gauge 3.5 inch Quincke spinal needle was advanced under fluoroscopic guidance into the sacral hiatus. After negative aspiration, 3 mls of contrast was injected under AP and lateral views and confirmed adequate spread along the nerve roots and in the epidural space. There was no evidence of intravascular uptake or intrathecal spread on imaging. \par  \par At this point, after negative aspiration, a total of 6mL volume of treatment injectate, consisting of 1 mL of dexamethasone 10mg/mL, and 5 mL of Preservative Free Normal Saline was injected easily. The needle was then flushed with 1 ml of saline and removed. The needle insertion site was dressed appropriately. \par  \par Patient was taken to the recovery room where they were monitored for a brief period of time. They tolerated the procedure well and were discharged home in stable condition with post procedural instructions. Patient was instructed to follow up in 2 weeks.

## 2023-05-26 ENCOUNTER — LABORATORY RESULT (OUTPATIENT)
Age: 63
End: 2023-05-26

## 2023-05-30 ENCOUNTER — APPOINTMENT (OUTPATIENT)
Dept: PHYSICAL MEDICINE AND REHAB | Facility: CLINIC | Age: 63
End: 2023-05-30

## 2023-05-31 ENCOUNTER — NON-APPOINTMENT (OUTPATIENT)
Age: 63
End: 2023-05-31

## 2023-06-01 ENCOUNTER — APPOINTMENT (OUTPATIENT)
Dept: INTERNAL MEDICINE | Facility: CLINIC | Age: 63
End: 2023-06-01
Payer: COMMERCIAL

## 2023-06-01 ENCOUNTER — NON-APPOINTMENT (OUTPATIENT)
Age: 63
End: 2023-06-01

## 2023-06-01 VITALS
DIASTOLIC BLOOD PRESSURE: 80 MMHG | SYSTOLIC BLOOD PRESSURE: 122 MMHG | TEMPERATURE: 98.9 F | BODY MASS INDEX: 18.65 KG/M2 | WEIGHT: 95 LBS | OXYGEN SATURATION: 97 % | HEART RATE: 80 BPM | HEIGHT: 60 IN

## 2023-06-01 VITALS — DIASTOLIC BLOOD PRESSURE: 60 MMHG | SYSTOLIC BLOOD PRESSURE: 110 MMHG

## 2023-06-01 DIAGNOSIS — R80.9 PROTEINURIA, UNSPECIFIED: ICD-10-CM

## 2023-06-01 DIAGNOSIS — Z87.39 PERSONAL HISTORY OF OTHER DISEASES OF THE MUSCULOSKELETAL SYSTEM AND CONNECTIVE TISSUE: ICD-10-CM

## 2023-06-01 DIAGNOSIS — M81.0 AGE-RELATED OSTEOPOROSIS W/OUT CURRENT PATHOLOGICAL FRACTURE: ICD-10-CM

## 2023-06-01 DIAGNOSIS — Z23 ENCOUNTER FOR IMMUNIZATION: ICD-10-CM

## 2023-06-01 DIAGNOSIS — M41.50 OTHER SECONDARY SCOLIOSIS, SITE UNSPECIFIED: ICD-10-CM

## 2023-06-01 DIAGNOSIS — Z80.3 FAMILY HISTORY OF MALIGNANT NEOPLASM OF BREAST: ICD-10-CM

## 2023-06-01 DIAGNOSIS — Z00.00 ENCOUNTER FOR GENERAL ADULT MEDICAL EXAMINATION W/OUT ABNORMAL FINDINGS: ICD-10-CM

## 2023-06-01 DIAGNOSIS — R73.09 OTHER ABNORMAL GLUCOSE: ICD-10-CM

## 2023-06-01 PROCEDURE — 93000 ELECTROCARDIOGRAM COMPLETE: CPT

## 2023-06-01 PROCEDURE — 99396 PREV VISIT EST AGE 40-64: CPT | Mod: 25

## 2023-06-01 RX ORDER — ZOLPIDEM TARTRATE 5 MG/1
5 TABLET ORAL
Qty: 30 | Refills: 0 | Status: DISCONTINUED | COMMUNITY
End: 2023-06-01

## 2023-06-01 RX ORDER — IBUPROFEN 800 MG
TABLET ORAL
Refills: 0 | Status: DISCONTINUED | COMMUNITY
End: 2023-06-01

## 2023-06-01 RX ORDER — METHYLPREDNISOLONE 4 MG/1
4 TABLET ORAL
Qty: 1 | Refills: 0 | Status: DISCONTINUED | COMMUNITY
Start: 2023-03-31 | End: 2023-06-01

## 2023-06-01 RX ORDER — METHYLPREDNISOLONE 4 MG/1
4 TABLET ORAL
Qty: 1 | Refills: 0 | Status: DISCONTINUED | COMMUNITY
Start: 2023-05-09 | End: 2023-06-01

## 2023-06-01 RX ORDER — TERBINAFINE HYDROCHLORIDE 250 MG/1
250 TABLET ORAL
Qty: 30 | Refills: 1 | Status: DISCONTINUED | COMMUNITY
Start: 2021-07-23 | End: 2023-06-01

## 2023-06-01 RX ORDER — METHYLPREDNISOLONE 4 MG/1
4 TABLET ORAL
Qty: 1 | Refills: 1 | Status: DISCONTINUED | COMMUNITY
Start: 2023-05-18 | End: 2023-06-01

## 2023-06-01 NOTE — HEALTH RISK ASSESSMENT
[Yes] : Yes [2 - 4 times a month (2 pts)] : 2-4 times a month (2 points) [3 or 4 (1 pt)] : 3 or 4  (1 point) [Never (0 pts)] : Never (0 points) [No] : In the past 12 months have you used drugs other than those required for medical reasons? No [2] : 2) Feeling down, depressed, or hopeless for more than half of the days (2) [Patient reported mammogram was normal] : Patient reported mammogram was normal [Patient reported PAP Smear was normal] : Patient reported PAP Smear was normal [Never] : Never [No falls in past year] : Patient reported no falls in the past year [None] : None [] :  [Fully functional (bathing, dressing, toileting, transferring, walking, feeding)] : Fully functional (bathing, dressing, toileting, transferring, walking, feeding) [Fully functional (using the telephone, shopping, preparing meals, housekeeping, doing laundry, using] : Fully functional and needs no help or supervision to perform IADLs (using the telephone, shopping, preparing meals, housekeeping, doing laundry, using transportation, managing medications and managing finances) [Smoke Detector] : smoke detector [Carbon Monoxide Detector] : carbon monoxide detector [Seat Belt] :  uses seat belt [Sunscreen] : uses sunscreen [de-identified] : no [de-identified] : reg [[id number]] : 1 [EyeExamDate] : 06/22 [Change in mental status noted] : No change in mental status noted [MammogramDate] : 01/23 [PapSmearDate] : 12/22 [BoneDensityDate] : 01/23 [ColonoscopyDate] : 01/22 [AdvancecareDate] : 6/1/23

## 2023-06-01 NOTE — PHYSICAL EXAM
[No Acute Distress] : no acute distress [Well Nourished] : well nourished [Well Developed] : well developed [Well-Appearing] : well-appearing [Normal Sclera/Conjunctiva] : normal sclera/conjunctiva [PERRL] : pupils equal round and reactive to light [EOMI] : extraocular movements intact [Normal Outer Ear/Nose] : the outer ears and nose were normal in appearance [Normal Oropharynx] : the oropharynx was normal [No JVD] : no jugular venous distention [No Lymphadenopathy] : no lymphadenopathy [Supple] : supple [Thyroid Normal, No Nodules] : the thyroid was normal and there were no nodules present [No Respiratory Distress] : no respiratory distress  [No Accessory Muscle Use] : no accessory muscle use [Clear to Auscultation] : lungs were clear to auscultation bilaterally [Normal Rate] : normal rate  [Regular Rhythm] : with a regular rhythm [Normal S1, S2] : normal S1 and S2 [No Murmur] : no murmur heard [No Carotid Bruits] : no carotid bruits [No Abdominal Bruit] : a ~M bruit was not heard ~T in the abdomen [No Varicosities] : no varicosities [Pedal Pulses Present] : the pedal pulses are present [No Edema] : there was no peripheral edema [No Palpable Aorta] : no palpable aorta [No Extremity Clubbing/Cyanosis] : no extremity clubbing/cyanosis [Soft] : abdomen soft [Non Tender] : non-tender [Non-distended] : non-distended [No HSM] : no HSM [Normal Bowel Sounds] : normal bowel sounds [Normal Posterior Cervical Nodes] : no posterior cervical lymphadenopathy [Normal Anterior Cervical Nodes] : no anterior cervical lymphadenopathy [No CVA Tenderness] : no CVA  tenderness [No Spinal Tenderness] : no spinal tenderness [No Joint Swelling] : no joint swelling [Grossly Normal Strength/Tone] : grossly normal strength/tone [No Rash] : no rash [Coordination Grossly Intact] : coordination grossly intact [No Focal Deficits] : no focal deficits [Normal Gait] : normal gait [Deep Tendon Reflexes (DTR)] : deep tendon reflexes were 2+ and symmetric [Normal Affect] : the affect was normal [Normal Insight/Judgement] : insight and judgment were intact [Normal Appearance] : normal in appearance [No Masses] : no palpable masses [No Axillary Lymphadenopathy] : no axillary lymphadenopathy [No Stool to Guaiac] : no stool to guaiac [No Mass] : no mass [Stool Occult Blood] : stool negative for occult blood Mastoid Interpolation Flap Text: A decision was made to reconstruct the defect utilizing an interpolation axial flap and a staged reconstruction.  A telfa template was made of the defect.  This telfa template was then used to outline the mastoid interpolation flap.  The donor area for the pedicle flap was then injected with anesthesia.  The flap was excised through the skin and subcutaneous tissue down to the layer of the underlying musculature.  The pedicle flap was carefully excised within this deep plane to maintain its blood supply.  The edges of the donor site were undermined.   The donor site was closed in a primary fashion.  The pedicle was then rotated into position and sutured.  Once the tube was sutured into place, adequate blood supply was confirmed with blanching and refill.  The pedicle was then wrapped with xeroform gauze and dressed appropriately with a telfa and gauze bandage to ensure continued blood supply and protect the attached pedicle.

## 2023-06-06 ENCOUNTER — NON-APPOINTMENT (OUTPATIENT)
Age: 63
End: 2023-06-06

## 2023-06-06 ENCOUNTER — APPOINTMENT (OUTPATIENT)
Dept: PHYSICAL MEDICINE AND REHAB | Facility: CLINIC | Age: 63
End: 2023-06-06
Payer: COMMERCIAL

## 2023-06-06 PROCEDURE — 99441: CPT

## 2023-06-06 NOTE — HISTORY OF PRESENT ILLNESS
[FreeTextEntry1] : This visit was conducted via phone call. Patient confirmed they were at work in [place], [place] . Dr. Walls was the office at [address] [place] [address], [place], [place]. Patient consented to the televisit. \par \par Ms. KIM ROLON is a 62 year old  female who presents for follow up. At last visit, she was ordered a caudal ANNA, given Robaxin and was on Lyrica via outside physician. She did not get any significant relief from the ANNA. Denies any new symptoms, just persistent pain. She has taken Lyrica 75mg BID via neurosurgery without significant relief. She does not want to take anything stronger. \par \par Location: low back pain \par Onset:Has had chronic back pain but worsened in 5/2023\par Provocation/Palliative: constant pain, no movements/intervention has been exacerbating pain \par Quality: Sharp and shooting, pins and needles in the calf \par Radiation:Down RLE, down anterior/lateral side of the leg to the ankle \par Severity: Can be 10/10\par Timing: Comes and goes, but more persistent lately\par \par No bowel/bladder changes. No groin numbness.

## 2023-06-06 NOTE — ASSESSMENT
[FreeTextEntry1] : Ms. KIM ROLON is a 62 year old female who presents with a significant history of scoliosis presents for lumbar radicular pain likely due to underlying stenosis from the scoliosis and listhesis. She did not get any significant relief from recent ANNA.  Denies any red flag signs. Will recommend:\par - Advised her to up Lyrica to 150mg BID as tolerated. Patient aware of side effects and risks including sedation, leg swelling, and possible mood changes. \par - Continue Methocarbamol 500 mg Qhs PRN. Advised of side effects including sedation. \par - Patient to follow up with neurosurgery regarding possible surgical management. Discussed case with Dr. Coleman.\par \par Return as needed. Patient aware of red flag signs including any changes to their bowel/bladder control, groin numbness or new weakness. Patient knows to seek immediate attention by calling 911 or going to nearest ER if these symptoms appear. \par \par I spent a total of 6 minutes on the phone for this encoutner.

## 2023-06-08 ENCOUNTER — APPOINTMENT (OUTPATIENT)
Dept: RADIOLOGY | Facility: CLINIC | Age: 63
End: 2023-06-08
Payer: COMMERCIAL

## 2023-06-08 ENCOUNTER — OUTPATIENT (OUTPATIENT)
Dept: OUTPATIENT SERVICES | Facility: HOSPITAL | Age: 63
LOS: 1 days | End: 2023-06-08
Payer: COMMERCIAL

## 2023-06-08 DIAGNOSIS — R60.0 LOCALIZED EDEMA: ICD-10-CM

## 2023-06-08 DIAGNOSIS — M41.9 SCOLIOSIS, UNSPECIFIED: ICD-10-CM

## 2023-06-08 PROCEDURE — 72083 X-RAY EXAM ENTIRE SPI 4/5 VW: CPT | Mod: 26

## 2023-06-08 PROCEDURE — 72083 X-RAY EXAM ENTIRE SPI 4/5 VW: CPT

## 2023-06-10 ENCOUNTER — APPOINTMENT (OUTPATIENT)
Dept: ULTRASOUND IMAGING | Facility: CLINIC | Age: 63
End: 2023-06-10
Payer: COMMERCIAL

## 2023-06-10 ENCOUNTER — OUTPATIENT (OUTPATIENT)
Dept: OUTPATIENT SERVICES | Facility: HOSPITAL | Age: 63
LOS: 1 days | End: 2023-06-10
Payer: COMMERCIAL

## 2023-06-10 DIAGNOSIS — R60.0 LOCALIZED EDEMA: ICD-10-CM

## 2023-06-10 PROCEDURE — 93971 EXTREMITY STUDY: CPT | Mod: 26,RT

## 2023-06-10 PROCEDURE — 93971 EXTREMITY STUDY: CPT

## 2023-06-13 ENCOUNTER — APPOINTMENT (OUTPATIENT)
Dept: PHYSICAL MEDICINE AND REHAB | Facility: CLINIC | Age: 63
End: 2023-06-13
Payer: COMMERCIAL

## 2023-06-13 VITALS — OXYGEN SATURATION: 99 % | HEART RATE: 75 BPM | DIASTOLIC BLOOD PRESSURE: 87 MMHG | SYSTOLIC BLOOD PRESSURE: 147 MMHG

## 2023-06-13 DIAGNOSIS — M53.9 DORSOPATHY, UNSPECIFIED: ICD-10-CM

## 2023-06-13 PROCEDURE — 99214 OFFICE O/P EST MOD 30 MIN: CPT

## 2023-06-13 RX ORDER — METHOCARBAMOL 500 MG/1
500 TABLET, FILM COATED ORAL
Qty: 30 | Refills: 0 | Status: ACTIVE | COMMUNITY
Start: 2023-05-16 | End: 1900-01-01

## 2023-06-13 NOTE — HISTORY OF PRESENT ILLNESS
[FreeTextEntry1] : Ms. KIM ROLON is a 62 year old  female who presents for follow up. At last visit, she was going to f/u with neurosurgery ( for which she has an appt next week), increased on Lyrica to 150mg BID and continued on Robaxin. She has been taking the Lyrica 75mg QID but without significant relief. She still takes Robaxin with helps her sleep a little at night. She is still with significant pain. She has neurosurgery follow up next week. \par \par Location: low back pain \par Onset:Has had chronic back pain but worsened in 5/2023\par Provocation/Palliative: constant pain, no movements/intervention has been exacerbating pain \par Quality: Sharp and shooting, pins and needles in the calf \par Radiation:Down RLE, down anterior/lateral side of the leg to the ankle \par Severity: Can be 10/10\par Timing: Comes and goes, but more persistent lately\par \par No bowel/bladder changes. No groin numbness.

## 2023-06-13 NOTE — ASSESSMENT
[FreeTextEntry1] : Ms. KIM ROLON is a 62 year old female who presents with a significant history of scoliosis presents for lumbar radicular pain likely due to underlying stenosis from the scoliosis and listhesis. Pain has been persistent and debilitating.  Denies any red flag signs. Will recommend:\par - Continue Methocarbamol 500 mg Qhs PRN. Advised of side effects including sedation. \par - She will take the Lyrica 150mg BID instead of 75mg QD starting with 150mg tonight and gradually increasing it.Patient aware of side effects and risks including sedation, leg swelling, and possible mood changes. \par - She will stop naproxen as she has not had significant relief, and start Mobic 15mg QD PRN. Patient advised on cardiac/gi/renal side effects. Patient encouraged to take medication with food and not with other NSAIDs. \par - She will take Tylenol 1300mg BID. \par - Gave Rx for Lidocaine 5% patches to use 12 hours on/off. \par - Follow up next week with Dr. Coleman of neurosurgery.\par \par \par Return as needed. Patient aware of red flag signs including any changes to their bowel/bladder control, groin numbness or new weakness. Patient knows to seek immediate attention by calling 911 or going to nearest ER if these symptoms appear.

## 2023-06-14 ENCOUNTER — NON-APPOINTMENT (OUTPATIENT)
Age: 63
End: 2023-06-14

## 2023-06-20 ENCOUNTER — APPOINTMENT (OUTPATIENT)
Dept: NEUROSURGERY | Facility: CLINIC | Age: 63
End: 2023-06-20
Payer: COMMERCIAL

## 2023-06-20 VITALS — DIASTOLIC BLOOD PRESSURE: 90 MMHG | SYSTOLIC BLOOD PRESSURE: 148 MMHG | OXYGEN SATURATION: 98 % | HEART RATE: 74 BPM

## 2023-06-20 PROCEDURE — 99215 OFFICE O/P EST HI 40 MIN: CPT

## 2023-06-22 NOTE — CONSULT LETTER
[Dear  ___] : Dear  [unfilled], [Courtesy Letter:] : I had the pleasure of seeing your patient, [unfilled], in my office today. [Sincerely,] : Sincerely, [FreeTextEntry2] : Sophia Whyte  [address] [place] [id number]  [FreeTextEntry1] : Mrs. Ying is a very pleasant 62-year-old female patient who was seen in our office today in follow-up.  The patient has a known scoliotic deformity with right-sided radicular pain.\par \par Unfortunately, the patient has not improved clinically with conservative therapy.  The patient continues to have severe pain radiating down the right side of her leg which is usually worse in the morning.  However, this pain is now become prevalent throughout the day as well.  The patient has attempted physical therapy, medications, and epidural injections but has not derived any significant benefit.  The patient returns today to consider other options.  The patient is currently taking 150 mg of Lyrica twice daily, 15 mg of meloxicam daily, and Tylenol 650 mg twice a day as needed.  The patient believes that the increased Lyrica dosage has provided some relief.\par \par On examination, the patient is alert, oriented, and compliant with the exam.  The patient continues to demonstrate full strength in the lower extremities bilaterally.  The patient continues to ambulate well.  The patient does not have significant coronal or sagittal imbalance on inspection.\par \par The patient is accompanied with several images of the lumbar and thoracic region.  The patient's most recent MRI scan was performed on March 25, 2023 demonstrating severe degenerative changes at L4/5 with a lateral listhesis.  These images also demonstrate lateral listhesis to a lesser degree at L2/3 and L3/4.  The patient additionally has a degenerative lumbar levoscoliosis which was previously reviewed with her.  Standing films demonstrate worsening of the patient's lateral listhesis at L4/5.  The patient's most recent imaging includes a scoliosis x-rays with lateral bend.  The patient's previous scoliosis x-rays demonstrated good coronal and sagittal balance despite her scoliotic deformity.  With lateral bend, the patient's thoracic curve changes approximately 10 degrees.  The patient's lumbar scoliotic deformity changes approximately 25 degrees with lateral bend.  The patient's sagittal and coronal balance remains within normal limits. \par \par Taken together, the patient has a clinical history and radiographic findings most consistent with right-sided leg pain secondary to right-sided lumbar radiculopathies as a result of her scoliotic deformity.  I explained to the patient that surgical correction for this problem would, at the very least, entail an L2-L5 lumbar decompression and fusion.  With the patient's updated images, we would also not completely correct the patient's lumbar scoliosis curve given the fixed nature of her thoracic curve.  The potential risks and benefits of the procedure were explained in detail to the patient and the patient would like to return home to discuss her options at this time.  The patient is aware that she runs a high risk of progressive adjacent segment degeneration above and below the construct given her scoliosis.  At L5/S1, there is a fractional curve causing foraminal stenosis slightly worse on the left but without ongoing nerve root compression.  The patient also denies any left-sided lower extremity symptoms.  The patient was offered including the L5/S1 level but understands that this addition would be prophylactic in nature.  At this time, the patient will be in contact with our office on an as-needed basis. [FreeTextEntry3] : Luc Coleman MD, PhD, CS, FAANS Attending Neurosurgeon  of Neurosurgery [facility] at [facility] at [address], [place] [address] Office: [phone] Fax: [phone]

## 2023-06-26 ENCOUNTER — APPOINTMENT (OUTPATIENT)
Dept: ORTHOPEDIC SURGERY | Facility: CLINIC | Age: 63
End: 2023-06-26
Payer: COMMERCIAL

## 2023-06-26 VITALS
SYSTOLIC BLOOD PRESSURE: 144 MMHG | WEIGHT: 95 LBS | DIASTOLIC BLOOD PRESSURE: 95 MMHG | OXYGEN SATURATION: 92 % | HEIGHT: 60 IN | TEMPERATURE: 96.7 F | BODY MASS INDEX: 18.65 KG/M2 | HEART RATE: 80 BPM

## 2023-06-26 PROCEDURE — 99204 OFFICE O/P NEW MOD 45 MIN: CPT

## 2023-06-28 ENCOUNTER — APPOINTMENT (OUTPATIENT)
Dept: ORTHOPEDIC SURGERY | Facility: CLINIC | Age: 63
End: 2023-06-28
Payer: COMMERCIAL

## 2023-06-28 VITALS
OXYGEN SATURATION: 98 % | WEIGHT: 95 LBS | HEART RATE: 100 BPM | TEMPERATURE: 96.6 F | DIASTOLIC BLOOD PRESSURE: 88 MMHG | BODY MASS INDEX: 18.65 KG/M2 | SYSTOLIC BLOOD PRESSURE: 131 MMHG | HEIGHT: 60 IN

## 2023-06-28 DIAGNOSIS — M43.16 SPONDYLOLISTHESIS, LUMBAR REGION: ICD-10-CM

## 2023-06-28 DIAGNOSIS — M48.07 SPINAL STENOSIS, LUMBOSACRAL REGION: ICD-10-CM

## 2023-06-28 DIAGNOSIS — M54.16 RADICULOPATHY, LUMBAR REGION: ICD-10-CM

## 2023-06-28 PROCEDURE — 99215 OFFICE O/P EST HI 40 MIN: CPT

## 2023-06-28 NOTE — DISCUSSION/SUMMARY
[de-identified] : We discussed further treatment options.  Overall, she is extremely functional.  Her main complaint is right-sided radiculopathy.  This does appear to be more in an L3 and L4 nerve distribution.  She does report an epidural injection that by my review of the records appears to be a caudal injection rather than a selective nerve root block.  We have discussed the role of surgery.  We discussed more limited procedures from a lumbar fusion perspective as well as larger thoracolumbar reconstructions.  Her main concern is that surgery will not lead to further surgeries.  I have explained to her that no matter what surgical approach is taking further surgery may be necessary.  However, we discussed other nonsurgical options that could alleviate her pain and lead to a more selective potential surgical intervention.  I recommended some selective nerve root blocks on the right to gain further diagnostic and hopefully therapeutic information.  Referral was given.  Follow-up afterwards or sooner with any changes or worsening of her symptoms.

## 2023-06-28 NOTE — HISTORY OF PRESENT ILLNESS
[de-identified] : Ms. KIM ROLON  is a 62 year old female who presents with 4-5 months of low back pain ad right leg pain/numbness.  She has a history of scoliosis, but has always been able to deal with it.  Her back pain was never this bad.  Normal bowel and bladder control.   Denies any recent fevers, chills, sweats, weight loss, or infection.  She has done PT which has made her worse and one ANNA which did not help.  She has been told she needs surgery. \par \par The patients past medical history, past surgical history, medications, allergies, and social history were reviewed by me today with the patient and documented accordingly.  In addition, the patient's family history, which is noncontributory to their visit, was also reviewed.\par

## 2023-06-28 NOTE — PHYSICAL EXAM
[Antalgic] : not antalgic [de-identified] : Examination of the lumbar spine reveals no midline tenderness palpation, step-offs, or skin lesions.  Full range of motion with respect to flexion, extension, lateral bending, and rotation. No tenderness to palpation of the sciatic notch. No tenderness palpation of the bilateral greater trochanters. No pain with passive internal/external rotation of the hips. No instability of bilateral lower extremities.  Negative RITA. Negative straight leg raise bilaterally. No bowstring. Negative femoral stretch. 5 out of 5 iliopsoas, hip abductors, hips adductors, quadriceps, hamstrings, gastrocsoleus, tibialis anterior, extensor hallucis longus, peroneals. Grossly intact sensation to light touch bilateral lower extremities. 1+ patellar and Achilles reflexes. Downgoing Babinski. No clonus. Intact proprioception. Palpable pulses. No skin lesion and no edema on the right and left lower extremities. [de-identified] : Scoliosis x-rays reveal a thoracic and lumbar deformity.  She has significant lumbosacral takeoff.  She is globally balanced however.\par \par Review of her lumbar spine MRI reveals severe stenosis from L3-5.  She has significant foraminal stenosis due to asymmetric collapse from L2-5.

## 2023-07-02 ENCOUNTER — APPOINTMENT (OUTPATIENT)
Dept: ULTRASOUND IMAGING | Facility: CLINIC | Age: 63
End: 2023-07-02

## 2023-07-07 ENCOUNTER — APPOINTMENT (OUTPATIENT)
Dept: ORTHOPEDIC SURGERY | Facility: CLINIC | Age: 63
End: 2023-07-07

## 2023-07-31 ENCOUNTER — APPOINTMENT (OUTPATIENT)
Dept: CARDIOLOGY | Facility: CLINIC | Age: 63
End: 2023-07-31
Payer: COMMERCIAL

## 2023-07-31 ENCOUNTER — OFFICE (OUTPATIENT)
Dept: URBAN - METROPOLITAN AREA CLINIC 102 | Facility: CLINIC | Age: 63
Setting detail: OPHTHALMOLOGY
End: 2023-07-31
Payer: COMMERCIAL

## 2023-07-31 VITALS
OXYGEN SATURATION: 100 % | WEIGHT: 98 LBS | HEIGHT: 60 IN | BODY MASS INDEX: 19.24 KG/M2 | DIASTOLIC BLOOD PRESSURE: 88 MMHG | HEART RATE: 67 BPM | SYSTOLIC BLOOD PRESSURE: 120 MMHG

## 2023-07-31 VITALS
WEIGHT: 98 LBS | HEART RATE: 67 BPM | SYSTOLIC BLOOD PRESSURE: 140 MMHG | HEIGHT: 60 IN | RESPIRATION RATE: 16 BRPM | OXYGEN SATURATION: 100 % | TEMPERATURE: 96.6 F | DIASTOLIC BLOOD PRESSURE: 86 MMHG | BODY MASS INDEX: 19.24 KG/M2

## 2023-07-31 DIAGNOSIS — H40.033: ICD-10-CM

## 2023-07-31 DIAGNOSIS — H02.825: ICD-10-CM

## 2023-07-31 DIAGNOSIS — M41.9 SCOLIOSIS, UNSPECIFIED: ICD-10-CM

## 2023-07-31 DIAGNOSIS — H25.13: ICD-10-CM

## 2023-07-31 DIAGNOSIS — Z82.49 FAMILY HISTORY OF ISCHEMIC HEART DISEASE AND OTHER DISEASES OF THE CIRCULATORY SYSTEM: ICD-10-CM

## 2023-07-31 DIAGNOSIS — H43.813: ICD-10-CM

## 2023-07-31 PROCEDURE — 93000 ELECTROCARDIOGRAM COMPLETE: CPT

## 2023-07-31 PROCEDURE — 92014 COMPRE OPH EXAM EST PT 1/>: CPT | Performed by: OPHTHALMOLOGY

## 2023-07-31 PROCEDURE — 99214 OFFICE O/P EST MOD 30 MIN: CPT

## 2023-07-31 PROCEDURE — 92020 GONIOSCOPY: CPT | Performed by: OPHTHALMOLOGY

## 2023-07-31 RX ORDER — NAPROXEN 500 MG/1
500 TABLET ORAL
Qty: 60 | Refills: 0 | Status: DISCONTINUED | COMMUNITY
Start: 2023-04-13 | End: 2023-07-31

## 2023-07-31 RX ORDER — MELOXICAM 15 MG/1
15 TABLET ORAL DAILY
Qty: 30 | Refills: 0 | Status: DISCONTINUED | COMMUNITY
Start: 2023-06-13 | End: 2023-07-31

## 2023-07-31 RX ORDER — PREGABALIN 75 MG/1
75 CAPSULE ORAL
Qty: 120 | Refills: 0 | Status: DISCONTINUED | COMMUNITY
Start: 2023-06-14 | End: 2023-07-31

## 2023-07-31 ASSESSMENT — REFRACTION_MANIFEST
OD_VA1: 20/20
OS_VA1: 20/20
OD_ADD: +2.25
OD_SPHERE: PLANO
OS_CYLINDER: SPHERE
OD_CYLINDER: SPHERE
OS_SPHERE: PLANO
OS_ADD: +2.25

## 2023-07-31 ASSESSMENT — VISUAL ACUITY
OS_BCVA: 20/60-1
OD_BCVA: 20/50-1

## 2023-07-31 ASSESSMENT — TONOMETRY
OS_IOP_MMHG: 18
OD_IOP_MMHG: 17

## 2023-07-31 ASSESSMENT — REFRACTION_CURRENTRX
OS_OVR_VA: 20/
OD_CYLINDER: SPH
OS_VPRISM_DIRECTION: SV
OS_AXIS: 090
OD_AXIS: 0
OD_OVR_VA: 20/
OS_CYLINDER: -0.50
OD_SPHERE: +1.75
OD_VPRISM_DIRECTION: SV
OS_SPHERE: +2.25

## 2023-07-31 ASSESSMENT — REFRACTION_AUTOREFRACTION
OS_SPHERE: +1.75
OD_SPHERE: +1.75
OS_AXIS: 113
OS_CYLINDER: -0.75
OD_CYLINDER: -0.75
OD_AXIS: 068

## 2023-07-31 ASSESSMENT — CONFRONTATIONAL VISUAL FIELD TEST (CVF)
OD_FINDINGS: FULL
OS_FINDINGS: FULL

## 2023-07-31 ASSESSMENT — SPHEQUIV_DERIVED
OS_SPHEQUIV: 1.375
OD_SPHEQUIV: 1.375

## 2023-07-31 NOTE — PHYSICAL EXAM
[Well Developed] : well developed [Normal Conjunctiva] : normal conjunctiva [Normal Venous Pressure] : normal venous pressure [Normal] : clear lung fields, good air entry, no respiratory distress [Normal Gait] : normal gait [No Edema] : no edema [Normal Radial B/L] : normal radial B/L [Normal PT B/L] : normal PT B/L [Normal DP B/L] : normal DP B/L [Diminished Pedal Pulses ___] : diminished pedal pulses [unfilled] [No Rash] : no rash [Moves all extremities] : moves all extremities [Alert and Oriented] : alert and oriented [de-identified] : back pain

## 2023-07-31 NOTE — DISCUSSION/SUMMARY
[FreeTextEntry1] : Patient with above hx   elevated blood pressure : while on cardizem   encourage patient to follow low salt diet   continue cardizem , will obtain echo   Abnormal EKG   likely normal variant , recommend echo  raynauds disease : exposure to cold air , continue cardizem  encourage patient to use gloves   chronic back pain , scoliodis

## 2023-07-31 NOTE — HISTORY OF PRESENT ILLNESS
[FreeTextEntry1] : 62 year old female with hx of scoliodis , chronic back pain , raynauds disease  came for cardiac evaluation  with complain that she has significant family hx of CAD Stroke , Patient denies any chest pain or shortness of breath or dizziness ,  patient blood pressure is elevated , as she has back pain , not adherent to low salt diet  blood work normal lipids

## 2023-07-31 NOTE — REVIEW OF SYSTEMS
[Joint Pain] : joint pain [Joint Swelling] : no joint swelling [Convulsions] : no convulsions [Limb Weakness (Paresis)] : no limb weakness (Paresis) [Confusion] : no confusion was observed [Easy Bleeding] : no tendency for easy bleeding [Fever] : no fever [Chills] : no chills [Blurry Vision] : no blurred vision [Earache] : no earache [SOB] : no shortness of breath [Palpitations] : no palpitations [Cough] : no cough [Abdominal Pain] : no abdominal pain [Dysuria] : no dysuria [Itching] : no itching [Skin Lesions] : no skin lesions [Dizziness] : no dizziness

## 2023-08-02 ENCOUNTER — APPOINTMENT (OUTPATIENT)
Dept: DERMATOLOGY | Facility: CLINIC | Age: 63
End: 2023-08-02
Payer: COMMERCIAL

## 2023-08-02 DIAGNOSIS — L20.9 ATOPIC DERMATITIS, UNSPECIFIED: ICD-10-CM

## 2023-08-02 DIAGNOSIS — L82.1 OTHER SEBORRHEIC KERATOSIS: ICD-10-CM

## 2023-08-02 DIAGNOSIS — L60.3 NAIL DYSTROPHY: ICD-10-CM

## 2023-08-02 DIAGNOSIS — L81.4 OTHER MELANIN HYPERPIGMENTATION: ICD-10-CM

## 2023-08-02 PROCEDURE — 99214 OFFICE O/P EST MOD 30 MIN: CPT

## 2023-08-02 NOTE — HISTORY OF PRESENT ILLNESS
[de-identified] : Pt. presents for skin check; c/o few spots of concern;   Severity:  mild   Modifying factors:  none Associated symptoms:  none Context:  no association with activity  also:  persistent nail issues R hand;  thumb, 4th finger;  took terbinafine x 6 wks last year, tolerated, did well but recurred;  now using tacrolimus for paronychia around nails

## 2023-08-02 NOTE — ASSESSMENT
[FreeTextEntry1] : Complete skin examination is negative for malignancy; Multiple new concerns were addressed and discussed. Therapeutic options and their risks and benefits; along with multiple diagnostic possibilities were discussed at length; risks and benefits of skin biopsy and/or other further study were discussed;  multiple SKs;  Continue regular exams; Follow up for TBSE in 1 year   onycho;  persistent;  options discussed  s/p terbinafine now with pseudomonas type discoloration under thumbnails- add tobradex suspension 2 drops BID

## 2023-08-02 NOTE — PHYSICAL EXAM
[Full Body Skin Exam Performed] : performed [FreeTextEntry3] : Skin examination performed of the face, neck, trunk, arms, legs;  The patient is well, alert and oriented, pleasant and cooperative. Eyelids, conjunctivae, oral mucosa, digits and nails all normal.   No cervical adenopathy.  Normal findings include:  Seborrheic keratoses- TNTC on flanks; macular lesion L lateral cheek Angiomas Lentigines  No lesions were suspicious for malignancy.   dystrophic nails; with lysis;  R 1,4 fingers;   + onycho on Bx in 2021;  b/l thumb now with dark green subungual discoloration

## 2023-08-09 ENCOUNTER — OUTPATIENT (OUTPATIENT)
Dept: OUTPATIENT SERVICES | Facility: HOSPITAL | Age: 63
LOS: 1 days | End: 2023-08-09
Payer: COMMERCIAL

## 2023-08-09 ENCOUNTER — APPOINTMENT (OUTPATIENT)
Dept: ULTRASOUND IMAGING | Facility: CLINIC | Age: 63
End: 2023-08-09

## 2023-08-09 DIAGNOSIS — Z00.00 ENCOUNTER FOR GENERAL ADULT MEDICAL EXAMINATION WITHOUT ABNORMAL FINDINGS: ICD-10-CM

## 2023-08-09 PROCEDURE — 93880 EXTRACRANIAL BILAT STUDY: CPT

## 2023-08-14 ENCOUNTER — APPOINTMENT (OUTPATIENT)
Dept: CARDIOLOGY | Facility: CLINIC | Age: 63
End: 2023-08-14
Payer: COMMERCIAL

## 2023-08-14 PROCEDURE — 93306 TTE W/DOPPLER COMPLETE: CPT

## 2023-09-10 LAB — 25(OH)D3 SERPL-MCNC: 72.1 NG/ML

## 2023-09-19 ENCOUNTER — RX RENEWAL (OUTPATIENT)
Age: 63
End: 2023-09-19

## 2023-10-02 ENCOUNTER — APPOINTMENT (OUTPATIENT)
Dept: CARDIOLOGY | Facility: CLINIC | Age: 63
End: 2023-10-02
Payer: COMMERCIAL

## 2023-10-02 ENCOUNTER — NON-APPOINTMENT (OUTPATIENT)
Age: 63
End: 2023-10-02

## 2023-10-02 ENCOUNTER — APPOINTMENT (OUTPATIENT)
Dept: INTERNAL MEDICINE | Facility: CLINIC | Age: 63
End: 2023-10-02
Payer: COMMERCIAL

## 2023-10-02 VITALS
SYSTOLIC BLOOD PRESSURE: 138 MMHG | OXYGEN SATURATION: 99 % | HEIGHT: 60 IN | WEIGHT: 102 LBS | DIASTOLIC BLOOD PRESSURE: 82 MMHG | BODY MASS INDEX: 20.03 KG/M2 | HEART RATE: 71 BPM | TEMPERATURE: 98.4 F

## 2023-10-02 VITALS — DIASTOLIC BLOOD PRESSURE: 90 MMHG | SYSTOLIC BLOOD PRESSURE: 142 MMHG

## 2023-10-02 VITALS
HEART RATE: 67 BPM | OXYGEN SATURATION: 99 % | SYSTOLIC BLOOD PRESSURE: 140 MMHG | DIASTOLIC BLOOD PRESSURE: 90 MMHG | RESPIRATION RATE: 14 BRPM

## 2023-10-02 VITALS
HEART RATE: 67 BPM | WEIGHT: 102 LBS | HEIGHT: 60 IN | SYSTOLIC BLOOD PRESSURE: 134 MMHG | OXYGEN SATURATION: 99 % | DIASTOLIC BLOOD PRESSURE: 90 MMHG | BODY MASS INDEX: 20.03 KG/M2

## 2023-10-02 DIAGNOSIS — I34.0 NONRHEUMATIC MITRAL (VALVE) INSUFFICIENCY: ICD-10-CM

## 2023-10-02 PROCEDURE — 93000 ELECTROCARDIOGRAM COMPLETE: CPT

## 2023-10-02 PROCEDURE — 99213 OFFICE O/P EST LOW 20 MIN: CPT | Mod: 25

## 2023-10-02 PROCEDURE — 99214 OFFICE O/P EST MOD 30 MIN: CPT

## 2023-10-02 PROCEDURE — 36415 COLL VENOUS BLD VENIPUNCTURE: CPT

## 2023-10-02 RX ORDER — PREGABALIN 75 MG/1
75 CAPSULE ORAL DAILY
Refills: 0 | Status: ACTIVE | COMMUNITY

## 2023-10-02 RX ORDER — DILTIAZEM HYDROCHLORIDE 60 MG/1
60 TABLET ORAL
Qty: 180 | Refills: 0 | Status: DISCONTINUED | COMMUNITY
End: 2023-10-02

## 2023-10-04 LAB
ALBUMIN SERPL ELPH-MCNC: 4.7 G/DL
ALBUMIN SERPL ELPH-MCNC: 4.7 G/DL
ALP BLD-CCNC: 81 U/L
ALT SERPL-CCNC: 19 U/L
ANION GAP SERPL CALC-SCNC: 12 MMOL/L
AST SERPL-CCNC: 23 U/L
BILIRUB DIRECT SERPL-MCNC: 0.1 MG/DL
BILIRUB INDIRECT SERPL-MCNC: 0.3 MG/DL
BILIRUB SERPL-MCNC: 0.4 MG/DL
BUN SERPL-MCNC: 11 MG/DL
CALCIUM SERPL-MCNC: 10.1 MG/DL
CHLORIDE SERPL-SCNC: 104 MMOL/L
CO2 SERPL-SCNC: 26 MMOL/L
CREAT SERPL-MCNC: 0.63 MG/DL
EGFR: 99 ML/MIN/1.73M2
GLUCOSE SERPL-MCNC: 102 MG/DL
PHOSPHATE SERPL-MCNC: 3.2 MG/DL
POTASSIUM SERPL-SCNC: 4.8 MMOL/L
PROT SERPL-MCNC: 6.6 G/DL
SODIUM SERPL-SCNC: 142 MMOL/L

## 2023-10-18 RX ORDER — DILTIAZEM HYDROCHLORIDE 180 MG/1
180 CAPSULE, EXTENDED RELEASE ORAL
Qty: 90 | Refills: 0 | Status: DISCONTINUED | COMMUNITY
Start: 2023-10-02 | End: 2023-10-18

## 2023-12-27 ENCOUNTER — RESULT CHARGE (OUTPATIENT)
Age: 63
End: 2023-12-27

## 2023-12-27 ENCOUNTER — APPOINTMENT (OUTPATIENT)
Dept: OBGYN | Facility: CLINIC | Age: 63
End: 2023-12-27
Payer: COMMERCIAL

## 2023-12-27 VITALS
SYSTOLIC BLOOD PRESSURE: 146 MMHG | HEIGHT: 60 IN | HEART RATE: 75 BPM | DIASTOLIC BLOOD PRESSURE: 92 MMHG | BODY MASS INDEX: 20.03 KG/M2 | WEIGHT: 102 LBS

## 2023-12-27 LAB
BILIRUB UR QL STRIP: NEGATIVE
CLARITY UR: CLEAR
COLLECTION METHOD: NORMAL
DATE COLLECTED: NORMAL
GLUCOSE UR-MCNC: NEGATIVE
HCG UR QL: 0 EU/DL
HEMOCCULT SP1 STL QL: NEGATIVE
HEMOGLOBIN: 16.1
HGB UR QL STRIP.AUTO: NEGATIVE
KETONES UR-MCNC: NORMAL
LEUKOCYTE ESTERASE UR QL STRIP: NEGATIVE
NITRITE UR QL STRIP: NEGATIVE
PH UR STRIP: 7
PROT UR STRIP-MCNC: NEGATIVE
QUALITY CONTROL: YES
SP GR UR STRIP: 1

## 2023-12-27 PROCEDURE — 85018 HEMOGLOBIN: CPT | Mod: QW

## 2023-12-27 PROCEDURE — 82270 OCCULT BLOOD FECES: CPT

## 2023-12-27 PROCEDURE — 81003 URINALYSIS AUTO W/O SCOPE: CPT | Mod: QW

## 2023-12-27 PROCEDURE — 99397 PER PM REEVAL EST PAT 65+ YR: CPT

## 2023-12-27 RX ORDER — ZOLPIDEM TARTRATE 5 MG/1
5 TABLET ORAL
Qty: 30 | Refills: 0 | Status: ACTIVE | COMMUNITY
Start: 2023-12-27 | End: 1900-01-01

## 2023-12-29 ENCOUNTER — LABORATORY RESULT (OUTPATIENT)
Age: 63
End: 2023-12-29

## 2023-12-29 NOTE — HISTORY OF PRESENT ILLNESS
[FreeTextEntry1] : Patient is a 61 y/o female here today for annual visit.  She is on boniva for osteoporosis tolerating well. Per her PCP she stopped the calcium/vitamin D  Her mother was just dx with breast CA at 83.  She denies any GYN complaints at this time

## 2023-12-29 NOTE — PLAN
[FreeTextEntry1] : Patient to follow up in 1 year for annual GYN exam Mammogram : now Rx given Colonoscopy due: 1/2026 Bone density due: now, rx given Pap ordered Hemoccult ordered Restart Ca 600mg per JW Boniva reordered  Ambien 5mg ordered   All questions answered, patient agreeable with plan.   I Meghan MENSAH  am scribing for the presence of Dr. Rodriguez the following sections HISTORY OF PRESENT ILLNESS, PAST MEDICAL/FAMILY/SOCIAL HISTORY; REVIEW OF SYSTEMS; VITAL SIGNS; PHYSICAL EXAM; DISPOSITION.    I personally performed the services described in the documentation, reviewed the documentation recorded by the scribe in my presence and it accurately and completely records my words and actions.

## 2023-12-29 NOTE — PHYSICAL EXAM
[Chaperone Present] : A chaperone was present in the examining room during all aspects of the physical examination [Appropriately responsive] : appropriately responsive [Alert] : alert [No Acute Distress] : no acute distress [No Lymphadenopathy] : no lymphadenopathy [Soft] : soft [Non-tender] : non-tender [Non-distended] : non-distended [No HSM] : No HSM [No Lesions] : no lesions [No Mass] : no mass [Oriented x3] : oriented x3 [Examination Of The Breasts] : a normal appearance [No Masses] : no breast masses were palpable [Labia Majora] : normal [Labia Minora] : normal [Uterine Adnexae] : normal [Normal rectal exam] : was normal [Normal Brown Stool] : was normal and brown [Normal] : was normal [None] : there was no rectal mass  [FreeTextEntry1] : Meghan Robert  [Occult Blood Positive] : was negative for occult blood analysis [Internal Hemorrhoid] : no internal hemorrhoids were present [External Hemorrhoid] : no external hemorrhoids were present [Skin Tags] : no residual hemorrhoidal skin tags

## 2024-01-02 ENCOUNTER — APPOINTMENT (OUTPATIENT)
Dept: INTERNAL MEDICINE | Facility: CLINIC | Age: 64
End: 2024-01-02

## 2024-01-02 ENCOUNTER — NON-APPOINTMENT (OUTPATIENT)
Age: 64
End: 2024-01-02

## 2024-01-02 LAB — CYTOLOGY CVX/VAG DOC THIN PREP: ABNORMAL

## 2024-01-08 ENCOUNTER — APPOINTMENT (OUTPATIENT)
Dept: CARDIOLOGY | Facility: CLINIC | Age: 64
End: 2024-01-08
Payer: COMMERCIAL

## 2024-01-08 VITALS
HEART RATE: 84 BPM | OXYGEN SATURATION: 97 % | WEIGHT: 102 LBS | SYSTOLIC BLOOD PRESSURE: 122 MMHG | HEIGHT: 60 IN | BODY MASS INDEX: 20.03 KG/M2 | DIASTOLIC BLOOD PRESSURE: 80 MMHG

## 2024-01-08 DIAGNOSIS — I34.0 NONRHEUMATIC MITRAL (VALVE) INSUFFICIENCY: ICD-10-CM

## 2024-01-08 DIAGNOSIS — I07.1 RHEUMATIC TRICUSPID INSUFFICIENCY: ICD-10-CM

## 2024-01-08 PROCEDURE — 93015 CV STRESS TEST SUPVJ I&R: CPT

## 2024-01-08 PROCEDURE — 99214 OFFICE O/P EST MOD 30 MIN: CPT

## 2024-01-08 NOTE — HISTORY OF PRESENT ILLNESS
[FreeTextEntry1] : 63 year old female PMH:  scoliosis , chronic back pain , Raynaud's disease  here today routine follow up s/p stress test today  done 2/2 strong FH CAD  Claims she is doing well no cardiovascular complaints  exercises 2-3x/week   Patient had echo showed normal ventricular function , mild to moderate TR MR , no LVH

## 2024-01-08 NOTE — DISCUSSION/SUMMARY
[FreeTextEntry1] : Here today in routine cardiac follow up/stress test with above hx,  HTN: pressure controlled on current medications   Raynaud's disease :  CW Rheumatology follow up  encourage patient to use gloves   Valvular heart disease : MR/TR mild-moderate by Echo will monitor yearly   chronic back pain , scoliosis

## 2024-01-08 NOTE — CARDIOLOGY SUMMARY
[de-identified] : 1/8/24 NSR NS 7/31/23 sinus rhythm  rsr prime nonspecific ST changes  10/2/23 normal sinus rhythm  [de-identified] : 1/8/24 normal stress test  [de-identified] : 8/14/23 Normal RV LV function Mild to moderate MR TR  [de-identified] : carotid US 8/9/23 No significant hemodynamic stenosis of either carotid artery.

## 2024-01-15 ENCOUNTER — RESULT REVIEW (OUTPATIENT)
Age: 64
End: 2024-01-15

## 2024-01-15 ENCOUNTER — NON-APPOINTMENT (OUTPATIENT)
Age: 64
End: 2024-01-15

## 2024-01-15 ENCOUNTER — APPOINTMENT (OUTPATIENT)
Dept: MAMMOGRAPHY | Facility: CLINIC | Age: 64
End: 2024-01-15
Payer: COMMERCIAL

## 2024-01-15 ENCOUNTER — OUTPATIENT (OUTPATIENT)
Dept: OUTPATIENT SERVICES | Facility: HOSPITAL | Age: 64
LOS: 1 days | End: 2024-01-15
Payer: COMMERCIAL

## 2024-01-15 ENCOUNTER — APPOINTMENT (OUTPATIENT)
Dept: ULTRASOUND IMAGING | Facility: CLINIC | Age: 64
End: 2024-01-15
Payer: COMMERCIAL

## 2024-01-15 DIAGNOSIS — Z00.00 ENCOUNTER FOR GENERAL ADULT MEDICAL EXAMINATION WITHOUT ABNORMAL FINDINGS: ICD-10-CM

## 2024-01-15 PROCEDURE — 77063 BREAST TOMOSYNTHESIS BI: CPT

## 2024-01-15 PROCEDURE — 77067 SCR MAMMO BI INCL CAD: CPT | Mod: 26

## 2024-01-15 PROCEDURE — 77067 SCR MAMMO BI INCL CAD: CPT

## 2024-01-15 PROCEDURE — 76641 ULTRASOUND BREAST COMPLETE: CPT | Mod: 26,50

## 2024-01-15 PROCEDURE — 77063 BREAST TOMOSYNTHESIS BI: CPT | Mod: 26

## 2024-01-15 PROCEDURE — 76641 ULTRASOUND BREAST COMPLETE: CPT

## 2024-01-31 ENCOUNTER — RX RENEWAL (OUTPATIENT)
Age: 64
End: 2024-01-31

## 2024-02-23 ENCOUNTER — APPOINTMENT (OUTPATIENT)
Dept: DERMATOLOGY | Facility: CLINIC | Age: 64
End: 2024-02-23

## 2024-03-08 ENCOUNTER — RX RENEWAL (OUTPATIENT)
Age: 64
End: 2024-03-08

## 2024-03-08 ENCOUNTER — APPOINTMENT (OUTPATIENT)
Dept: DERMATOLOGY | Facility: CLINIC | Age: 64
End: 2024-03-08
Payer: COMMERCIAL

## 2024-03-08 DIAGNOSIS — L30.9 DERMATITIS, UNSPECIFIED: ICD-10-CM

## 2024-03-08 PROCEDURE — 99214 OFFICE O/P EST MOD 30 MIN: CPT

## 2024-03-08 RX ORDER — IBANDRONATE SODIUM 150 MG/1
150 TABLET ORAL
Qty: 3 | Refills: 2 | Status: ACTIVE | COMMUNITY
Start: 1900-01-01 | End: 1900-01-01

## 2024-03-08 RX ORDER — FLUCONAZOLE 150 MG/1
150 TABLET ORAL
Qty: 12 | Refills: 1 | Status: ACTIVE | COMMUNITY
Start: 2024-03-08 | End: 1900-01-01

## 2024-03-08 NOTE — HISTORY OF PRESENT ILLNESS
[FreeTextEntry1] : Nail f/u [de-identified] : Patient here for persistent nail issues x 4 years. Patient took terbinafine in 2022; did well, but recurred. She then used tacrolimus for paronychia around nails. Tobradex suspension 2 drops BID was added last visit. Patient states nails are worse.  Patient also c/o eczema of hands and ears.

## 2024-03-08 NOTE — ASSESSMENT
[FreeTextEntry1] : Onychomycosis:  Start fluconazole 150mg Q weekly Discussed risks and benefits of medication  Tacrolimus ointment BID to affected nails    Atopic eczema:  We have discussed the nature and course of this condition. We have discussed a skin care regimen inclusive of bathing and moisturizing routines. We have emphasized the importance of this regimen in improving the condition. We have discussed treatment options, expectations from treatments, and associated side effects of topical therapies.  Tacrolimus ointment BID for maintenance

## 2024-03-08 NOTE — PHYSICAL EXAM
[Alert] : alert [Oriented x 3] : ~L oriented x 3 [Well Nourished] : well nourished [FreeTextEntry3] : Thickened, discolored fingernails- right hand 1st and 4th digits, left hand 2nd digit   Pink, eczematous patches on the hands and post-auricular

## 2024-03-20 ENCOUNTER — RX RENEWAL (OUTPATIENT)
Age: 64
End: 2024-03-20

## 2024-03-20 RX ORDER — LIDOCAINE 5% 700 MG/1
5 PATCH TOPICAL DAILY
Qty: 30 | Refills: 1 | Status: ACTIVE | COMMUNITY
Start: 2023-06-13 | End: 1900-01-01

## 2024-05-23 ENCOUNTER — LABORATORY RESULT (OUTPATIENT)
Age: 64
End: 2024-05-23

## 2024-05-30 RX ORDER — TACROLIMUS 1 MG/G
0.1 OINTMENT TOPICAL
Qty: 1 | Refills: 2 | Status: ACTIVE | COMMUNITY
Start: 2024-03-08 | End: 1900-01-01

## 2024-06-04 ENCOUNTER — APPOINTMENT (OUTPATIENT)
Dept: INTERNAL MEDICINE | Facility: CLINIC | Age: 64
End: 2024-06-04
Payer: COMMERCIAL

## 2024-06-04 ENCOUNTER — NON-APPOINTMENT (OUTPATIENT)
Age: 64
End: 2024-06-04

## 2024-06-04 VITALS
HEIGHT: 60 IN | DIASTOLIC BLOOD PRESSURE: 94 MMHG | HEART RATE: 76 BPM | TEMPERATURE: 97.8 F | WEIGHT: 96 LBS | SYSTOLIC BLOOD PRESSURE: 130 MMHG | OXYGEN SATURATION: 97 % | BODY MASS INDEX: 18.85 KG/M2

## 2024-06-04 DIAGNOSIS — B35.1 TINEA UNGUIUM: ICD-10-CM

## 2024-06-04 DIAGNOSIS — R76.8 OTHER SPECIFIED ABNORMAL IMMUNOLOGICAL FINDINGS IN SERUM: ICD-10-CM

## 2024-06-04 DIAGNOSIS — M81.0 AGE-RELATED OSTEOPOROSIS W/OUT CURRENT PATHOLOGICAL FRACTURE: ICD-10-CM

## 2024-06-04 DIAGNOSIS — K63.5 POLYP OF COLON: ICD-10-CM

## 2024-06-04 DIAGNOSIS — Z87.39 PERSONAL HISTORY OF OTHER DISEASES OF THE MUSCULOSKELETAL SYSTEM AND CONNECTIVE TISSUE: ICD-10-CM

## 2024-06-04 DIAGNOSIS — Z00.00 ENCOUNTER FOR GENERAL ADULT MEDICAL EXAMINATION W/OUT ABNORMAL FINDINGS: ICD-10-CM

## 2024-06-04 DIAGNOSIS — I73.00 RAYNAUD'S SYNDROME W/OUT GANGRENE: ICD-10-CM

## 2024-06-04 DIAGNOSIS — R94.31 ABNORMAL ELECTROCARDIOGRAM [ECG] [EKG]: ICD-10-CM

## 2024-06-04 DIAGNOSIS — G47.00 INSOMNIA, UNSPECIFIED: ICD-10-CM

## 2024-06-04 DIAGNOSIS — Z79.1 LONG TERM (CURRENT) USE OF NON-STEROIDAL ANTI-INFLAMMATORIES (NSAID): ICD-10-CM

## 2024-06-04 DIAGNOSIS — M54.16 RADICULOPATHY, LUMBAR REGION: ICD-10-CM

## 2024-06-04 DIAGNOSIS — D75.839 THROMBOCYTOSIS, UNSPECIFIED: ICD-10-CM

## 2024-06-04 DIAGNOSIS — R03.0 ELEVATED BLOOD-PRESSURE READING, W/OUT DIAGNOSIS OF HYPERTENSION: ICD-10-CM

## 2024-06-04 PROCEDURE — G0009: CPT

## 2024-06-04 PROCEDURE — 93000 ELECTROCARDIOGRAM COMPLETE: CPT

## 2024-06-04 PROCEDURE — 99397 PER PM REEVAL EST PAT 65+ YR: CPT | Mod: 25

## 2024-06-04 PROCEDURE — 90677 PCV20 VACCINE IM: CPT

## 2024-06-04 RX ORDER — PREGABALIN 75 MG/1
75 CAPSULE ORAL
Qty: 120 | Refills: 0 | Status: DISCONTINUED | COMMUNITY
Start: 2023-05-09 | End: 2024-06-04

## 2024-06-04 RX ORDER — TOBRAMYCIN AND DEXAMETHASONE 3; 1 MG/ML; MG/ML
0.3-0.1 SUSPENSION/ DROPS OPHTHALMIC
Qty: 1 | Refills: 3 | Status: DISCONTINUED | COMMUNITY
Start: 2023-08-02 | End: 2024-06-04

## 2024-06-04 RX ORDER — DILTIAZEM HYDROCHLORIDE 120 MG/1
120 CAPSULE, EXTENDED RELEASE ORAL
Qty: 30 | Refills: 0 | Status: ACTIVE | COMMUNITY
Start: 2024-06-04 | End: 1900-01-01

## 2024-06-04 RX ORDER — TACROLIMUS 1 MG/G
0.1 OINTMENT TOPICAL
Qty: 1 | Refills: 3 | Status: DISCONTINUED | COMMUNITY
Start: 2023-03-10 | End: 2024-06-04

## 2024-06-04 RX ORDER — VALSARTAN 80 MG/1
80 TABLET, COATED ORAL DAILY
Qty: 1 | Refills: 1 | Status: DISCONTINUED | COMMUNITY
Start: 2023-10-18 | End: 2024-06-04

## 2024-06-04 NOTE — REVIEW OF SYSTEMS
Caller: Kaitlin Ward    Doctor/Office: Dr Svitlana Giron     CB#: [phone]    Escalation: Disability forms Calling on behalf of patient Luciana MARSHALL paperwork they received is not fully filled out  The dates are incorrect and the drs title is missing  Please return call  Thank you  [Negative] : Heme/Lymph

## 2024-06-04 NOTE — PHYSICAL EXAM
[No Acute Distress] : no acute distress [Well Nourished] : well nourished [Well Developed] : well developed [Well-Appearing] : well-appearing [Normal Sclera/Conjunctiva] : normal sclera/conjunctiva [PERRL] : pupils equal round and reactive to light [EOMI] : extraocular movements intact [Normal Outer Ear/Nose] : the outer ears and nose were normal in appearance [Normal Oropharynx] : the oropharynx was normal [No JVD] : no jugular venous distention [No Lymphadenopathy] : no lymphadenopathy [Supple] : supple [Thyroid Normal, No Nodules] : the thyroid was normal and there were no nodules present [No Respiratory Distress] : no respiratory distress  [No Accessory Muscle Use] : no accessory muscle use [Clear to Auscultation] : lungs were clear to auscultation bilaterally [Normal Rate] : normal rate  [Regular Rhythm] : with a regular rhythm [Normal S1, S2] : normal S1 and S2 [No Murmur] : no murmur heard [No Carotid Bruits] : no carotid bruits [No Abdominal Bruit] : a ~M bruit was not heard ~T in the abdomen [No Varicosities] : no varicosities [Pedal Pulses Present] : the pedal pulses are present [No Edema] : there was no peripheral edema [No Palpable Aorta] : no palpable aorta [No Extremity Clubbing/Cyanosis] : no extremity clubbing/cyanosis [No Nipple Discharge] : no nipple discharge [No Axillary Lymphadenopathy] : no axillary lymphadenopathy [Soft] : abdomen soft [Non Tender] : non-tender [Non-distended] : non-distended [No Masses] : no abdominal mass palpated [No HSM] : no HSM [Normal Bowel Sounds] : normal bowel sounds [Normal Posterior Cervical Nodes] : no posterior cervical lymphadenopathy [Normal Anterior Cervical Nodes] : no anterior cervical lymphadenopathy [No CVA Tenderness] : no CVA  tenderness [No Spinal Tenderness] : no spinal tenderness [No Joint Swelling] : no joint swelling [Grossly Normal Strength/Tone] : grossly normal strength/tone [No Rash] : no rash [Coordination Grossly Intact] : coordination grossly intact [No Focal Deficits] : no focal deficits [Normal Gait] : normal gait [Deep Tendon Reflexes (DTR)] : deep tendon reflexes were 2+ and symmetric [Normal Affect] : the affect was normal [Normal Insight/Judgement] : insight and judgment were intact [FreeTextEntry1] : deferred to dr moran

## 2024-06-04 NOTE — HEALTH RISK ASSESSMENT
[Yes] : Yes [2 - 4 times a month (2 pts)] : 2-4 times a month (2 points) [1 or 2 (0 pts)] : 1 or 2 (0 points) [Never (0 pts)] : Never (0 points) [No] : In the past 12 months have you used drugs other than those required for medical reasons? No [0] : 2) Feeling down, depressed, or hopeless: Not at all (0) [Never] : Never [No falls in past year] : Patient reported no falls in the past year [PHQ-2 Negative - No further assessment needed] : PHQ-2 Negative - No further assessment needed [de-identified] : no [de-identified] : no [KGQ2Itoov] : 0 [EyeExamDate] : 08/23 [Patient reported mammogram was normal] : Patient reported mammogram was normal [Patient reported PAP Smear was normal] : Patient reported PAP Smear was normal [Patient reported bone density results were normal] : Patient reported bone density results were normal [Change in mental status noted] : No change in mental status noted [None] : None [] :  [Fully functional (bathing, dressing, toileting, transferring, walking, feeding)] : Fully functional (bathing, dressing, toileting, transferring, walking, feeding) [Fully functional (using the telephone, shopping, preparing meals, housekeeping, doing laundry, using] : Fully functional and needs no help or supervision to perform IADLs (using the telephone, shopping, preparing meals, housekeeping, doing laundry, using transportation, managing medications and managing finances) [Reports changes in hearing] : Reports no changes in hearing [Smoke Detector] : smoke detector [Carbon Monoxide Detector] : carbon monoxide detector [Guns at Home] : no guns at home [Seat Belt] :  uses seat belt [Sunscreen] : uses sunscreen [MammogramDate] : 01/24 [PapSmearDate] : 12/23 [BoneDensityDate] : 6/22 [ColonoscopyDate] : 12/20 [With Patient/Caregiver] : , with patient/caregiver [AdvancecareDate] : 6/4/24

## 2024-07-06 ENCOUNTER — LABORATORY RESULT (OUTPATIENT)
Age: 64
End: 2024-07-06

## 2024-07-08 ENCOUNTER — APPOINTMENT (OUTPATIENT)
Dept: CARDIOLOGY | Facility: CLINIC | Age: 64
End: 2024-07-08
Payer: COMMERCIAL

## 2024-07-08 ENCOUNTER — NON-APPOINTMENT (OUTPATIENT)
Age: 64
End: 2024-07-08

## 2024-07-08 VITALS
HEIGHT: 60 IN | SYSTOLIC BLOOD PRESSURE: 150 MMHG | DIASTOLIC BLOOD PRESSURE: 86 MMHG | HEART RATE: 67 BPM | WEIGHT: 97 LBS | BODY MASS INDEX: 19.04 KG/M2 | OXYGEN SATURATION: 99 %

## 2024-07-08 DIAGNOSIS — R94.31 ABNORMAL ELECTROCARDIOGRAM [ECG] [EKG]: ICD-10-CM

## 2024-07-08 DIAGNOSIS — I34.0 NONRHEUMATIC MITRAL (VALVE) INSUFFICIENCY: ICD-10-CM

## 2024-07-08 DIAGNOSIS — I07.1 RHEUMATIC TRICUSPID INSUFFICIENCY: ICD-10-CM

## 2024-07-08 DIAGNOSIS — I10 ESSENTIAL (PRIMARY) HYPERTENSION: ICD-10-CM

## 2024-07-08 PROCEDURE — 93000 ELECTROCARDIOGRAM COMPLETE: CPT

## 2024-07-08 PROCEDURE — G2211 COMPLEX E/M VISIT ADD ON: CPT

## 2024-07-08 PROCEDURE — 99214 OFFICE O/P EST MOD 30 MIN: CPT

## 2024-07-08 RX ORDER — VALSARTAN 80 MG/1
80 TABLET, COATED ORAL DAILY
Refills: 0 | Status: ACTIVE | COMMUNITY

## 2024-07-08 RX ORDER — VALSARTAN AND HYDROCHLOROTHIAZIDE 80; 12.5 MG/1; MG/1
80-12.5 TABLET, FILM COATED ORAL DAILY
Qty: 30 | Refills: 1 | Status: ACTIVE | COMMUNITY
Start: 2024-07-08 | End: 1900-01-01

## 2024-07-10 ENCOUNTER — NON-APPOINTMENT (OUTPATIENT)
Age: 64
End: 2024-07-10

## 2024-07-16 ENCOUNTER — APPOINTMENT (OUTPATIENT)
Dept: INTERNAL MEDICINE | Facility: CLINIC | Age: 64
End: 2024-07-16

## 2024-08-02 ENCOUNTER — OFFICE (OUTPATIENT)
Dept: URBAN - METROPOLITAN AREA CLINIC 102 | Facility: CLINIC | Age: 64
Setting detail: OPHTHALMOLOGY
End: 2024-08-02
Payer: COMMERCIAL

## 2024-08-02 DIAGNOSIS — H40.033: ICD-10-CM

## 2024-08-02 DIAGNOSIS — H02.825: ICD-10-CM

## 2024-08-02 DIAGNOSIS — H43.813: ICD-10-CM

## 2024-08-02 DIAGNOSIS — H25.13: ICD-10-CM

## 2024-08-02 PROCEDURE — 92014 COMPRE OPH EXAM EST PT 1/>: CPT | Performed by: OPHTHALMOLOGY

## 2024-08-02 PROCEDURE — 92020 GONIOSCOPY: CPT | Performed by: OPHTHALMOLOGY

## 2024-08-02 ASSESSMENT — CONFRONTATIONAL VISUAL FIELD TEST (CVF)
OS_FINDINGS: FULL
OD_FINDINGS: FULL

## 2024-08-08 ENCOUNTER — APPOINTMENT (OUTPATIENT)
Dept: DERMATOLOGY | Facility: CLINIC | Age: 64
End: 2024-08-08

## 2024-08-08 PROCEDURE — 99214 OFFICE O/P EST MOD 30 MIN: CPT

## 2024-08-08 NOTE — PHYSICAL EXAM
[Full Body Skin Exam Performed] : performed [FreeTextEntry3] : Skin examination performed of the face, neck, trunk, arms, legs;  The patient is well, alert and oriented, pleasant and cooperative. Eyelids, conjunctivae, oral mucosa, digits and nails all normal.   No cervical adenopathy.  Normal findings include:  Seborrheic keratoses- TNTC on flanks; macular lesion L lateral cheek Angiomas Lentigines  No lesions were suspicious for malignancy.   dystrophic nails; distal lysis, rest NL + onycho on Bx in 2021;  dark green subungual discoloration now resolved

## 2024-08-08 NOTE — HISTORY OF PRESENT ILLNESS
[de-identified] : Pt. presents for skin check; c/o few spots of concern;   Severity:  mild   Modifying factors:  none Associated symptoms:  none Context:  no association with activity  also:  persistent nail issues R hand;  thumb, 4th finger;  took terbinafine x 6 wks 2023 tolerated, did well but recurred;  then given fluconazole weekly in 3/2024;  much improved now using tacrolimus for paronychia around nails

## 2024-08-08 NOTE — ASSESSMENT
[FreeTextEntry1] : Complete skin examination is negative for malignancy; Multiple new concerns were addressed and discussed. Therapeutic options and their risks and benefits; along with multiple diagnostic possibilities were discussed at length; risks and benefits of skin biopsy and/or other further study were discussed;  multiple SKs;  Continue regular exams; Follow up for TBSE in 1 year   onycho;  persistent;  with likely concomitant candida;  much improved with weekly fluconazole since 3/2024 options discussed  finish tx, then D/C continue tacrolimus HS, keep nails trimmed, dilute vinegar soaks before application  Follow up for TBSE in 1 year - sooner prn

## 2024-08-12 ENCOUNTER — APPOINTMENT (OUTPATIENT)
Dept: CARDIOLOGY | Facility: CLINIC | Age: 64
End: 2024-08-12
Payer: COMMERCIAL

## 2024-08-12 VITALS
OXYGEN SATURATION: 100 % | RESPIRATION RATE: 18 BRPM | HEART RATE: 67 BPM | SYSTOLIC BLOOD PRESSURE: 120 MMHG | DIASTOLIC BLOOD PRESSURE: 89 MMHG

## 2024-08-12 PROCEDURE — 99211 OFF/OP EST MAY X REQ PHY/QHP: CPT

## 2024-10-31 ENCOUNTER — APPOINTMENT (OUTPATIENT)
Dept: ORTHOPEDIC SURGERY | Facility: CLINIC | Age: 64
End: 2024-10-31
Payer: COMMERCIAL

## 2024-10-31 VITALS
DIASTOLIC BLOOD PRESSURE: 97 MMHG | HEART RATE: 76 BPM | SYSTOLIC BLOOD PRESSURE: 146 MMHG | HEIGHT: 61 IN | BODY MASS INDEX: 19.26 KG/M2 | WEIGHT: 102 LBS

## 2024-10-31 DIAGNOSIS — M48.07 SPINAL STENOSIS, LUMBOSACRAL REGION: ICD-10-CM

## 2024-10-31 DIAGNOSIS — M41.9 SCOLIOSIS, UNSPECIFIED: ICD-10-CM

## 2024-10-31 DIAGNOSIS — M43.16 SPONDYLOLISTHESIS, LUMBAR REGION: ICD-10-CM

## 2024-10-31 PROCEDURE — 99214 OFFICE O/P EST MOD 30 MIN: CPT

## 2024-10-31 PROCEDURE — 72100 X-RAY EXAM L-S SPINE 2/3 VWS: CPT

## 2024-11-02 ENCOUNTER — APPOINTMENT (OUTPATIENT)
Dept: MRI IMAGING | Facility: CLINIC | Age: 64
End: 2024-11-02

## 2024-11-02 ENCOUNTER — OUTPATIENT (OUTPATIENT)
Dept: OUTPATIENT SERVICES | Facility: HOSPITAL | Age: 64
LOS: 1 days | End: 2024-11-02
Payer: COMMERCIAL

## 2024-11-02 DIAGNOSIS — M41.9 SCOLIOSIS, UNSPECIFIED: ICD-10-CM

## 2024-11-02 PROCEDURE — 72148 MRI LUMBAR SPINE W/O DYE: CPT | Mod: 26

## 2024-11-02 PROCEDURE — 72148 MRI LUMBAR SPINE W/O DYE: CPT

## 2024-11-15 ENCOUNTER — APPOINTMENT (OUTPATIENT)
Dept: ORTHOPEDIC SURGERY | Facility: CLINIC | Age: 64
End: 2024-11-15

## 2024-11-15 VITALS
SYSTOLIC BLOOD PRESSURE: 142 MMHG | HEART RATE: 72 BPM | HEIGHT: 61 IN | DIASTOLIC BLOOD PRESSURE: 90 MMHG | BODY MASS INDEX: 19.26 KG/M2 | WEIGHT: 102 LBS

## 2024-11-15 DIAGNOSIS — M41.50 OTHER SECONDARY SCOLIOSIS, SITE UNSPECIFIED: ICD-10-CM

## 2024-11-15 PROCEDURE — 99215 OFFICE O/P EST HI 40 MIN: CPT

## 2024-11-23 ENCOUNTER — OUTPATIENT (OUTPATIENT)
Dept: OUTPATIENT SERVICES | Facility: HOSPITAL | Age: 64
LOS: 1 days | End: 2024-11-23
Payer: COMMERCIAL

## 2024-11-23 ENCOUNTER — APPOINTMENT (OUTPATIENT)
Dept: CT IMAGING | Facility: CLINIC | Age: 64
End: 2024-11-23

## 2024-11-23 DIAGNOSIS — M41.50 OTHER SECONDARY SCOLIOSIS, SITE UNSPECIFIED: ICD-10-CM

## 2024-11-23 DIAGNOSIS — R76.8 OTHER SPECIFIED ABNORMAL IMMUNOLOGICAL FINDINGS IN SERUM: ICD-10-CM

## 2024-11-23 DIAGNOSIS — Z00.8 ENCOUNTER FOR OTHER GENERAL EXAMINATION: ICD-10-CM

## 2024-11-23 PROCEDURE — 72131 CT LUMBAR SPINE W/O DYE: CPT | Mod: 26

## 2024-11-23 PROCEDURE — 72128 CT CHEST SPINE W/O DYE: CPT | Mod: 26

## 2024-11-23 PROCEDURE — 72128 CT CHEST SPINE W/O DYE: CPT

## 2024-11-23 PROCEDURE — 72131 CT LUMBAR SPINE W/O DYE: CPT

## 2024-12-13 ENCOUNTER — APPOINTMENT (OUTPATIENT)
Dept: ORTHOPEDIC SURGERY | Facility: CLINIC | Age: 64
End: 2024-12-13
Payer: COMMERCIAL

## 2024-12-13 VITALS
WEIGHT: 102 LBS | HEART RATE: 41 BPM | SYSTOLIC BLOOD PRESSURE: 154 MMHG | BODY MASS INDEX: 19.26 KG/M2 | DIASTOLIC BLOOD PRESSURE: 108 MMHG | HEIGHT: 61 IN

## 2024-12-13 DIAGNOSIS — M41.9 SCOLIOSIS, UNSPECIFIED: ICD-10-CM

## 2024-12-13 DIAGNOSIS — M85.80 OTHER SPECIFIED DISORDERS OF BONE DENSITY AND STRUCTURE, UNSPECIFIED SITE: ICD-10-CM

## 2024-12-13 PROCEDURE — 72083 X-RAY EXAM ENTIRE SPI 4/5 VW: CPT

## 2024-12-13 PROCEDURE — 99215 OFFICE O/P EST HI 40 MIN: CPT

## 2024-12-27 PROBLEM — Z12.11 COLON CANCER SCREENING: Status: ACTIVE | Noted: 2024-12-27

## 2024-12-27 PROBLEM — Z12.4 CERVICAL CANCER SCREENING: Status: ACTIVE | Noted: 2024-12-27

## 2024-12-27 PROBLEM — Z01.419 ENCOUNTER FOR ANNUAL ROUTINE GYNECOLOGICAL EXAMINATION: Status: ACTIVE | Noted: 2024-12-27

## 2025-01-03 ENCOUNTER — APPOINTMENT (OUTPATIENT)
Dept: OBGYN | Facility: CLINIC | Age: 65
End: 2025-01-03

## 2025-01-03 VITALS
DIASTOLIC BLOOD PRESSURE: 84 MMHG | WEIGHT: 95 LBS | HEART RATE: 70 BPM | SYSTOLIC BLOOD PRESSURE: 132 MMHG | BODY MASS INDEX: 18.65 KG/M2 | HEIGHT: 60 IN

## 2025-01-03 DIAGNOSIS — Z12.31 ENCOUNTER FOR SCREENING MAMMOGRAM FOR MALIGNANT NEOPLASM OF BREAST: ICD-10-CM

## 2025-01-03 DIAGNOSIS — Z12.11 ENCOUNTER FOR SCREENING FOR MALIGNANT NEOPLASM OF COLON: ICD-10-CM

## 2025-01-03 DIAGNOSIS — Z12.4 ENCOUNTER FOR SCREENING FOR MALIGNANT NEOPLASM OF CERVIX: ICD-10-CM

## 2025-01-03 DIAGNOSIS — Z01.419 ENCOUNTER FOR GYNECOLOGICAL EXAMINATION (GENERAL) (ROUTINE) W/OUT ABNORMAL FINDINGS: ICD-10-CM

## 2025-01-03 LAB
BILIRUB UR QL STRIP: NEGATIVE
CLARITY UR: CLEAR
COLLECTION METHOD: NORMAL
DATE COLLECTED: NORMAL
GLUCOSE UR-MCNC: NEGATIVE
HCG UR QL: 0.2 EU/DL
HEMOCCULT SP1 STL QL: NEGATIVE
HEMOGLOBIN: 15
HGB UR QL STRIP.AUTO: NEGATIVE
KETONES UR-MCNC: NORMAL
LEUKOCYTE ESTERASE UR QL STRIP: NEGATIVE
NITRITE UR QL STRIP: NEGATIVE
PH UR STRIP: 6.5
PROT UR STRIP-MCNC: NEGATIVE
QUALITY CONTROL: YES
SP GR UR STRIP: 1.02

## 2025-01-03 PROCEDURE — 99397 PER PM REEVAL EST PAT 65+ YR: CPT

## 2025-01-03 PROCEDURE — 81003 URINALYSIS AUTO W/O SCOPE: CPT | Mod: QW

## 2025-01-03 PROCEDURE — 85018 HEMOGLOBIN: CPT | Mod: QW

## 2025-01-03 PROCEDURE — 99459 PELVIC EXAMINATION: CPT

## 2025-01-03 PROCEDURE — 82270 OCCULT BLOOD FECES: CPT

## 2025-01-07 LAB — CYTOLOGY CVX/VAG DOC THIN PREP: ABNORMAL

## 2025-01-14 ENCOUNTER — OUTPATIENT (OUTPATIENT)
Dept: OUTPATIENT SERVICES | Facility: HOSPITAL | Age: 65
LOS: 1 days | End: 2025-01-14
Payer: COMMERCIAL

## 2025-01-14 ENCOUNTER — APPOINTMENT (OUTPATIENT)
Dept: RADIOLOGY | Facility: CLINIC | Age: 65
End: 2025-01-14
Payer: COMMERCIAL

## 2025-01-14 DIAGNOSIS — M81.0 AGE-RELATED OSTEOPOROSIS WITHOUT CURRENT PATHOLOGICAL FRACTURE: ICD-10-CM

## 2025-01-14 DIAGNOSIS — Z00.8 ENCOUNTER FOR OTHER GENERAL EXAMINATION: ICD-10-CM

## 2025-01-14 PROCEDURE — 77080 DXA BONE DENSITY AXIAL: CPT

## 2025-01-14 PROCEDURE — 77080 DXA BONE DENSITY AXIAL: CPT | Mod: 26

## 2025-01-16 RX ORDER — PREGABALIN 75 MG/1
75 CAPSULE ORAL
Qty: 30 | Refills: 0 | Status: ACTIVE | COMMUNITY
Start: 2025-01-16 | End: 1900-01-01

## 2025-01-20 ENCOUNTER — NON-APPOINTMENT (OUTPATIENT)
Age: 65
End: 2025-01-20

## 2025-01-22 ENCOUNTER — RESULT REVIEW (OUTPATIENT)
Age: 65
End: 2025-01-22

## 2025-01-22 ENCOUNTER — OUTPATIENT (OUTPATIENT)
Dept: OUTPATIENT SERVICES | Facility: HOSPITAL | Age: 65
LOS: 1 days | End: 2025-01-22
Payer: COMMERCIAL

## 2025-01-22 ENCOUNTER — APPOINTMENT (OUTPATIENT)
Dept: ULTRASOUND IMAGING | Facility: CLINIC | Age: 65
End: 2025-01-22
Payer: COMMERCIAL

## 2025-01-22 ENCOUNTER — APPOINTMENT (OUTPATIENT)
Dept: MAMMOGRAPHY | Facility: CLINIC | Age: 65
End: 2025-01-22
Payer: COMMERCIAL

## 2025-01-22 DIAGNOSIS — Z12.31 ENCOUNTER FOR SCREENING MAMMOGRAM FOR MALIGNANT NEOPLASM OF BREAST: ICD-10-CM

## 2025-01-22 DIAGNOSIS — R92.30 DENSE BREASTS, UNSPECIFIED: ICD-10-CM

## 2025-01-22 DIAGNOSIS — Z00.8 ENCOUNTER FOR OTHER GENERAL EXAMINATION: ICD-10-CM

## 2025-01-22 PROCEDURE — 77067 SCR MAMMO BI INCL CAD: CPT | Mod: 26

## 2025-01-22 PROCEDURE — 77063 BREAST TOMOSYNTHESIS BI: CPT | Mod: 26

## 2025-01-22 PROCEDURE — 76641 ULTRASOUND BREAST COMPLETE: CPT | Mod: 26,50

## 2025-01-22 PROCEDURE — 76641 ULTRASOUND BREAST COMPLETE: CPT

## 2025-01-22 PROCEDURE — 77067 SCR MAMMO BI INCL CAD: CPT

## 2025-01-22 PROCEDURE — 77063 BREAST TOMOSYNTHESIS BI: CPT

## 2025-02-07 ENCOUNTER — APPOINTMENT (OUTPATIENT)
Dept: ORTHOPEDIC SURGERY | Facility: CLINIC | Age: 65
End: 2025-02-07
Payer: COMMERCIAL

## 2025-02-07 VITALS
HEIGHT: 60 IN | HEART RATE: 76 BPM | SYSTOLIC BLOOD PRESSURE: 131 MMHG | BODY MASS INDEX: 18.65 KG/M2 | WEIGHT: 95 LBS | DIASTOLIC BLOOD PRESSURE: 86 MMHG

## 2025-02-07 DIAGNOSIS — M41.9 SCOLIOSIS, UNSPECIFIED: ICD-10-CM

## 2025-02-07 DIAGNOSIS — M85.80 OTHER SPECIFIED DISORDERS OF BONE DENSITY AND STRUCTURE, UNSPECIFIED SITE: ICD-10-CM

## 2025-02-07 PROCEDURE — 99215 OFFICE O/P EST HI 40 MIN: CPT

## 2025-02-18 ENCOUNTER — NON-APPOINTMENT (OUTPATIENT)
Age: 65
End: 2025-02-18

## 2025-02-19 ENCOUNTER — APPOINTMENT (OUTPATIENT)
Dept: INTERNAL MEDICINE | Facility: CLINIC | Age: 65
End: 2025-02-19

## 2025-03-04 ENCOUNTER — APPOINTMENT (OUTPATIENT)
Dept: CARDIOLOGY | Facility: CLINIC | Age: 65
End: 2025-03-04
Payer: COMMERCIAL

## 2025-03-04 ENCOUNTER — NON-APPOINTMENT (OUTPATIENT)
Age: 65
End: 2025-03-04

## 2025-03-04 VITALS
HEIGHT: 60 IN | OXYGEN SATURATION: 97 % | DIASTOLIC BLOOD PRESSURE: 90 MMHG | BODY MASS INDEX: 19.04 KG/M2 | HEART RATE: 73 BPM | SYSTOLIC BLOOD PRESSURE: 126 MMHG | WEIGHT: 97 LBS

## 2025-03-04 DIAGNOSIS — R94.31 ABNORMAL ELECTROCARDIOGRAM [ECG] [EKG]: ICD-10-CM

## 2025-03-04 DIAGNOSIS — I07.1 RHEUMATIC TRICUSPID INSUFFICIENCY: ICD-10-CM

## 2025-03-04 DIAGNOSIS — I10 ESSENTIAL (PRIMARY) HYPERTENSION: ICD-10-CM

## 2025-03-04 DIAGNOSIS — I73.00 RAYNAUD'S SYNDROME W/OUT GANGRENE: ICD-10-CM

## 2025-03-04 DIAGNOSIS — Z01.810 ENCOUNTER FOR PREPROCEDURAL CARDIOVASCULAR EXAMINATION: ICD-10-CM

## 2025-03-04 DIAGNOSIS — I34.0 NONRHEUMATIC MITRAL (VALVE) INSUFFICIENCY: ICD-10-CM

## 2025-03-04 PROCEDURE — G2211 COMPLEX E/M VISIT ADD ON: CPT

## 2025-03-04 PROCEDURE — 93000 ELECTROCARDIOGRAM COMPLETE: CPT

## 2025-03-04 PROCEDURE — 99214 OFFICE O/P EST MOD 30 MIN: CPT

## 2025-03-07 ENCOUNTER — OUTPATIENT (OUTPATIENT)
Dept: OUTPATIENT SERVICES | Facility: HOSPITAL | Age: 65
LOS: 1 days | End: 2025-03-07
Payer: COMMERCIAL

## 2025-03-07 VITALS
SYSTOLIC BLOOD PRESSURE: 120 MMHG | HEART RATE: 50 BPM | WEIGHT: 90.39 LBS | RESPIRATION RATE: 16 BRPM | TEMPERATURE: 98 F | HEIGHT: 60 IN | OXYGEN SATURATION: 99 % | DIASTOLIC BLOOD PRESSURE: 80 MMHG

## 2025-03-07 DIAGNOSIS — I10 ESSENTIAL (PRIMARY) HYPERTENSION: ICD-10-CM

## 2025-03-07 DIAGNOSIS — M41.9 SCOLIOSIS, UNSPECIFIED: ICD-10-CM

## 2025-03-07 DIAGNOSIS — Z29.9 ENCOUNTER FOR PROPHYLACTIC MEASURES, UNSPECIFIED: ICD-10-CM

## 2025-03-07 DIAGNOSIS — Z98.890 OTHER SPECIFIED POSTPROCEDURAL STATES: Chronic | ICD-10-CM

## 2025-03-07 DIAGNOSIS — Z91.89 OTHER SPECIFIED PERSONAL RISK FACTORS, NOT ELSEWHERE CLASSIFIED: ICD-10-CM

## 2025-03-07 DIAGNOSIS — G47.33 OBSTRUCTIVE SLEEP APNEA (ADULT) (PEDIATRIC): ICD-10-CM

## 2025-03-07 DIAGNOSIS — Z01.818 ENCOUNTER FOR OTHER PREPROCEDURAL EXAMINATION: ICD-10-CM

## 2025-03-07 LAB
A1C WITH ESTIMATED AVERAGE GLUCOSE RESULT: 5.6 % — SIGNIFICANT CHANGE UP (ref 4–5.6)
ANION GAP SERPL CALC-SCNC: 10 MMOL/L — SIGNIFICANT CHANGE UP (ref 5–17)
APTT BLD: 30.8 SEC — SIGNIFICANT CHANGE UP (ref 24.5–35.6)
BASOPHILS # BLD AUTO: 0.05 K/UL — SIGNIFICANT CHANGE UP (ref 0–0.2)
BASOPHILS NFR BLD AUTO: 0.9 % — SIGNIFICANT CHANGE UP (ref 0–2)
BLD GP AB SCN SERPL QL: SIGNIFICANT CHANGE UP
BUN SERPL-MCNC: 11.2 MG/DL — SIGNIFICANT CHANGE UP (ref 8–20)
CALCIUM SERPL-MCNC: 9.6 MG/DL — SIGNIFICANT CHANGE UP (ref 8.4–10.5)
CHLORIDE SERPL-SCNC: 96 MMOL/L — SIGNIFICANT CHANGE UP (ref 96–108)
CO2 SERPL-SCNC: 30 MMOL/L — HIGH (ref 22–29)
CREAT SERPL-MCNC: 0.58 MG/DL — SIGNIFICANT CHANGE UP (ref 0.5–1.3)
EGFR: 100 ML/MIN/1.73M2 — SIGNIFICANT CHANGE UP
EGFR: 100 ML/MIN/1.73M2 — SIGNIFICANT CHANGE UP
EOSINOPHIL # BLD AUTO: 0.08 K/UL — SIGNIFICANT CHANGE UP (ref 0–0.5)
EOSINOPHIL NFR BLD AUTO: 1.4 % — SIGNIFICANT CHANGE UP (ref 0–6)
ESTIMATED AVERAGE GLUCOSE: 114 MG/DL — SIGNIFICANT CHANGE UP (ref 68–114)
GLUCOSE SERPL-MCNC: 89 MG/DL — SIGNIFICANT CHANGE UP (ref 70–99)
HCT VFR BLD CALC: 41.8 % — SIGNIFICANT CHANGE UP (ref 34.5–45)
HGB BLD-MCNC: 14.3 G/DL — SIGNIFICANT CHANGE UP (ref 11.5–15.5)
IMM GRANULOCYTES # BLD AUTO: 0.02 K/UL — SIGNIFICANT CHANGE UP (ref 0–0.07)
IMM GRANULOCYTES NFR BLD AUTO: 0.4 % — SIGNIFICANT CHANGE UP (ref 0–0.9)
INR BLD: 0.89 RATIO — SIGNIFICANT CHANGE UP (ref 0.85–1.16)
LYMPHOCYTES # BLD AUTO: 1.79 K/UL — SIGNIFICANT CHANGE UP (ref 1–3.3)
LYMPHOCYTES NFR BLD AUTO: 31.9 % — SIGNIFICANT CHANGE UP (ref 13–44)
MCHC RBC-ENTMCNC: 31.2 PG — SIGNIFICANT CHANGE UP (ref 27–34)
MCHC RBC-ENTMCNC: 34.2 G/DL — SIGNIFICANT CHANGE UP (ref 32–36)
MCV RBC AUTO: 91.3 FL — SIGNIFICANT CHANGE UP (ref 80–100)
MONOCYTES # BLD AUTO: 0.59 K/UL — SIGNIFICANT CHANGE UP (ref 0–0.9)
MONOCYTES NFR BLD AUTO: 10.5 % — SIGNIFICANT CHANGE UP (ref 2–14)
MRSA PCR RESULT.: SIGNIFICANT CHANGE UP
NEUTROPHILS # BLD AUTO: 3.08 K/UL — SIGNIFICANT CHANGE UP (ref 1.8–7.4)
NEUTROPHILS NFR BLD AUTO: 54.9 % — SIGNIFICANT CHANGE UP (ref 43–77)
NRBC # BLD AUTO: 0 K/UL — SIGNIFICANT CHANGE UP (ref 0–0)
NRBC # FLD: 0 K/UL — SIGNIFICANT CHANGE UP (ref 0–0)
NRBC BLD AUTO-RTO: 0 /100 WBCS — SIGNIFICANT CHANGE UP (ref 0–0)
PLATELET # BLD AUTO: 413 K/UL — HIGH (ref 150–400)
PMV BLD: 8.5 FL — SIGNIFICANT CHANGE UP (ref 7–13)
POTASSIUM SERPL-MCNC: 4.5 MMOL/L — SIGNIFICANT CHANGE UP (ref 3.5–5.3)
POTASSIUM SERPL-SCNC: 4.5 MMOL/L — SIGNIFICANT CHANGE UP (ref 3.5–5.3)
PROTHROM AB SERPL-ACNC: 10.3 SEC — SIGNIFICANT CHANGE UP (ref 9.9–13.4)
RBC # BLD: 4.58 M/UL — SIGNIFICANT CHANGE UP (ref 3.8–5.2)
RBC # FLD: 11.9 % — SIGNIFICANT CHANGE UP (ref 10.3–14.5)
S AUREUS DNA NOSE QL NAA+PROBE: SIGNIFICANT CHANGE UP
SODIUM SERPL-SCNC: 136 MMOL/L — SIGNIFICANT CHANGE UP (ref 135–145)
WBC # BLD: 5.61 K/UL — SIGNIFICANT CHANGE UP (ref 3.8–10.5)
WBC # FLD AUTO: 5.61 K/UL — SIGNIFICANT CHANGE UP (ref 3.8–10.5)

## 2025-03-07 NOTE — H&P PST ADULT - NSICDXPASTMEDICALHX_GEN_ALL_CORE_FT
PAST MEDICAL HISTORY:  H/O osteoporosis     HTN (hypertension)     Scoliosis      PAST MEDICAL HISTORY:  H/O osteoporosis     HTN (hypertension)     Lumbar spondylosis     Scoliosis

## 2025-03-07 NOTE — H&P PST ADULT - NSHP PST SURGERY DATE_DT_GEN_A_CORE
Patient LVM stating he feels a scab/growth in the surgical incision area from his surgery in 05/2019. Requesting to speak to Dr Cazares.    Called patient, patient stated that the scab has been growing for about 4-5 weeks. Patient denies touching the area and states that no trauma occurred. Patient denies any headaches, visual changes, dizziness, or balance issues. Informed patient that Dr Cazares is not with [facility] anymore and recommended that he see his PCP for further work-up since he has not been in the clinic since 10/2019. Patient agreeable with the plan.  
24-Mar-2025

## 2025-03-07 NOTE — H&P PST ADULT - MUSCULOSKELETAL COMMENTS
scoliosis, left lower back numbness right LE calf to knee pain in lower back left side, OA antalgic gait

## 2025-03-07 NOTE — H&P PST ADULT - NS PRO FEM  PAP SMEARS 3YRS
HISTORY OF PRESENT ILLNESS:  The patient is a 26 year old female coming today for physical. She states she's been feeling well.    Past Medical History:   Diagnosis Date   • Anxiety    • Depressive disorder        ALLERGIES:  No Known Allergies    Current Outpatient Medications   Medication Sig   • escitalopram (LEXAPRO) 20 MG tablet TAKE 1 TABLET BY MOUTH DAILY   • Cholecalciferol (VITAMIN D PO) Take  by mouth.     No current facility-administered medications for this visit.        Social History     Tobacco Use   • Smoking status: Never Smoker   • Smokeless tobacco: Never Used   Substance Use Topics   • Alcohol use: Yes     Comment: occasionally       Family History   Problem Relation Age of Onset   • Anxiety disorder Father        REVIEW OF SYSTEMS:  The rest of the cardiovascular, respiratory, gastrointestinal, neurologic, urinary and musculoskeletal and all other systems are reviewed and are negative.    PHYSICAL EXAM:  VITAL SIGNS:   Visit Vitals  /62   Pulse 80   Ht 5' 6\" (1.676 m)   Wt 68.4 kg   LMP 12/03/2019   BMI 24.34 kg/m²     GENERAL: Healthy, alert and in no distress. Affect is varied and appropriate.  HEENT: Head normocephalic. No masses, lesions, tenderness or abnormalities. Pupils equal, round, reactive to light. Extraocular movements intact. Sclerae white and non-icteric, no conjunctival erythema, exudate or swelling.  Normal lids.  External ears normal. Canals clear. Tympanic membranes normal.  Nose normal.  Lips, mucosa, and tongue normal. Teeth and gums normal. Oropharynx clear.  NECK: Symmetric, supple, without adenopathy and thyroid normal size, non-tender, without nodularity.  CHEST: Symmetrical, no chest deformities noted and no chest wall tenderness.  LUNGS: Clear to auscultation and percussion.   CARDIOVASCULAR: Regular rate and rhythm and no murmurs, clicks, or gallops.   ABDOMEN: Soft, non-tender, non-distended.  No masses palpated.  No hepatosplenomegaly.  Normal active bowel  sounds.  NEUROLOGIC: Cranial nerves II-XII intact. Sensory and motor grossly intact. Reflexes normal and symmetric.  SKIN: Skin color, texture, turgor are normal. There are no bruises, rashes or lesions.  ThinPrep Pap smear is obtained  ASSESSMENT:   1. Routine general medical examination at a health care facility    2. Screening for cervical cancer    3. Irregular menses        PLAN:  Routine care discussed including getting 150 minutes of aerobic exercise, 1000mg calcium and 2000 international units vitamin D daily. Also reviewed Mediterranean diet benefits.    Orders Placed This Encounter   • Lipid Panel with Reflex   • Comprehensive Metabolic Panel   • Thyroid Stimulating Hormone Reflex   • Pregnancy Test Qualitative Serum   • Thin Prep Pap Test with HPV Reflex      yes

## 2025-03-07 NOTE — H&P PST ADULT - HEART RATE (BEATS/MIN)
50 Advancement Flap (Single) Text: The defect edges were debeveled with a #15 scalpel blade. Given the location of the defect and the proximity to free margins a single advancement flap was deemed most appropriate. Using a sterile surgical marker, an appropriate advancement flap was drawn incorporating the defect and placing the expected incisions within the relaxed skin tension lines where possible. The area thus outlined was incised deep to adipose tissue with a #15 scalpel blade. The skin margins were undermined to an appropriate distance in all directions utilizing iris scissors. Following this, the designed flap was advanced and carried over into the primary defect and sutured into place.

## 2025-03-07 NOTE — H&P PST ADULT - ASSESSMENT
Spine booklet provided, ERP protocol reviewed, MSSA/MRSA swab done in pst, results pending and pt. verbalized agreement and understanding.         63 yo female presents for PST with PMH of HTN, Duodenal spasm, osteoporosis, Scoliosis, s/p b/l shoulder, s/p lumpectomy. Patient c/o a complex adult deformity r/t scoliosis which she has undergone many years of conservative care including injections physical therapy core strengthening home exercise excetra. Based upon progressive pain progressive lumbar spondylosis progressive deformity patient wishes to consider surgical management in order to help her current pain levels deformity and progression of her deformity into the future. She states the symptoms are worsening. The patient mentions the symptoms started  year(s) ago. Pain levels include a current pain level of 3/10, an average pain level of 2/10, a minimum pain level of 1/10 and a maximum pain level of 10/10. She states the symptoms seem to be constant. Modifying factors - worsened by bending and worsened by lifting. Relieving factors include relieved by exercise regimen and relieved by heat. No relieving factors are noted. Associated Symptoms: no ataxia,no incontinence,good dexterityandno urinary retention. Patient scheduled for anterior lumbar interbody fusion L4-L5, L5-S1 Lateral Fusion L2-L3, L3-L4 on 3/24/2025 AND Posterior instrumented FusionT9 Pelvis, Possible T4-Pelvis on 3/26/25 with Dr. Cao.   Spine booklet provided, ERP protocol reviewed, MSSA/MRSA swab done in pst, results pending and pt. verbalized agreement and understanding. Pt. to have medical clearance with [facility] and pt. verbalized agreement and understanding. Pt. to have cardiac clearance with palla and pt. verbalized agreement and understanding. Patient told to stop all vitamins, supplements and NSAIDs starting 5 days prior to procedure.       CAPRINI SCORE    AGE RELATED RISK FACTORS                                                             [ ] Age 41-60 years                                            (1 Point)  [x ] Age: 61-74 years                                           (2 Points)                 [ ] Age= 75 years                                                (3 Points)             DISEASE RELATED RISK FACTORS                                                       [ ] Edema in the lower extremities                 (1 Point)                     [ ] Varicose veins                                               (1 Point)                                 [x ] BMI > 25 Kg/m2                                            (1 Point)                                  [ ] Serious infection (ie PNA)                            (1 Point)                     [ ] Lung disease ( COPD, Emphysema)            (1 Point)                                                                          [ ] Acute myocardial infarction                         (1 Point)                  [ ] Congestive heart failure (in the previous month)  (1 Point)         [ ] Inflammatory bowel disease                            (1 Point)                  [ ] Central venous access, PICC or Port               (2 points)       (within the last month)                                                                [ ] Stroke (in the previous month)                        (5 Points)    [ ] Previous or present malignancy                       (2 points)                                                                                                                                                         HEMATOLOGY RELATED FACTORS                                                         [ ] Prior episodes of VTE                                     (3 Points)                     [ ] Positive family history for VTE                      (3 Points)                  [ ] Prothrombin 90719 A                                     (3 Points)                     [ ] Factor V Leiden                                                (3 Points)                        [ ] Lupus anticoagulants                                      (3 Points)                                                           [ ] Anticardiolipin antibodies                              (3 Points)                                                       [ ] High homocysteine in the blood                   (3 Points)                                             [ ] Other congenital or acquired thrombophilia      (3 Points)                                                [ ] Heparin induced thrombocytopenia                  (3 Points)                                        MOBILITY RELATED FACTORS  [ ] Bed rest                                                         (1 Point)  [ ] Plaster cast                                                    (2 points)  [ ] Bed bound for more than 72 hours           (2 Points)    GENDER SPECIFIC FACTORS  [ ] Pregnancy or had a baby within the last month   (1 Point)  [ ] Post-partum < 6 weeks                                   (1 Point)  [ ] Hormonal therapy  or oral contraception   (1 Point)  [ ] History of pregnancy complications              (1 point)  [ ] Unexplained or recurrent              (1 Point)    OTHER RISK FACTORS                                           (1 Point)  [x ] BMI >40, smoking, diabetes requiring insulin, chemotherapy  blood transfusions and length of surgery over 2 hours    SURGERY RELATED RISK FACTORS  [ ]  Section within the last month     (1 Point)  [ ] Minor surgery                                                  (1 Point)  [ ] Arthroscopic surgery                                       (2 Points)  [x ] Planned major surgery lasting more            (2 Points)      than 45 minutes     [ ] Elective hip or knee joint replacement       (5 points)       surgery                                                TRAUMA RELATED RISK FACTORS  [ ] Fracture of the hip, pelvis, or leg                       (5 Points)  [ ] Spinal cord injury resulting in paralysis             (5 points)       (in the previous month)    [ ] Paralysis  (less than 1 month)                             (5 Points)  [ ] Multiple Trauma within 1 month                        (5 Points)    Total Score [     6   ]    Caprini Score 0-2: Low Risk, NO VTE prophylaxis required for most patients, encourage ambulation  Caprini Score 3-6: Moderate Risk , pharmacologic VTE prophylaxis is indicated for most patients (in the absence of contraindications)  Caprini Score Greater than or =7: High risk, pharmocologic VTE prophylaxis indicated for most patients (in the absence of contraindications)    OPIOID RISK TOOL    VIRGINIE EACH BOX THAT APPLIES AND ADD TOTALS AT THE END    FAMILY HISTORY OF SUBSTANCE ABUSE                 FEMALE         MALE                                                Alcohol                             [  ]1 pt          [  ]3pts                                               Illegal Durgs                     [  ]2 pts        [  ]3pts                                               Rx Drugs                           [  ]4 pts        [  ]4 pts    PERSONAL HISTORY OF SUBSTANCE ABUSE                                                                                          Alcohol                             [  ]3 pts       [  ]3 pts                                               Illegal Drugs                     [  ]4 pts        [  ]4 pts                                               Rx Drugs                           [  ]5 pts        [  ]5 pts    AGE BETWEEN 16-45 YEARS                                      [  ]1 pt         [  ]1 pt    HISTORY OF PREADOLESCENT   SEXUAL ABUSE                                                             [  ]3 pts        [  ]0pts    PSYCHOLOGICAL DISEASE                     ADD, OCD, Bipolar, Schizophrenia        [  ]2 pts         [  ]2 pts                      Depression                                               [  ]1 pt           [  ]1 pt           SCORING TOTAL   (add numbers and type here)              (*0**)                                     A score of 3 or lower indicated LOW risk for future opioid abuse  A score of 4 to 7 indicated moderate risk for future opioid abuse  A score of 8 or higher indicates a high risk for opioid abuse

## 2025-03-07 NOTE — H&P PST ADULT - OTHER CARE PROVIDERS
Dr. Whyte [phone]; Dr. V Palla [facility] Dr. Whyte [phone]; Dr. V Palla [facility] [phone]

## 2025-03-07 NOTE — H&P PST ADULT - NSICDXFAMILYHX_GEN_ALL_CORE_FT
FAMILY HISTORY:  Father  Still living? Unknown  FH: heart disease, Age at diagnosis: Age Unknown  FH: lung cancer, Age at diagnosis: Age Unknown    Mother  Still living? Unknown  FH: breast cancer, Age at diagnosis: Age Unknown  FH: heart disease, Age at diagnosis: Age Unknown

## 2025-03-07 NOTE — H&P PST ADULT - ATTENDING COMMENTS
Attending statement:  I have personally seen this patient, and formed a face to face diagnostic evaluation on this patient on this date.  I have reviewed the PA, NP and or Medical/PA student and/or Resident documentation and agree with the history, physical exam and plan of care except if noted otherwise.      Patient presents for an adult deformity correction/scoliosis surgery.  This will be performed in a staged fashion stage I will be in a left at L5-S1 potentially L4-L5 and then a lateral approach at 2334 and possibly 4 5.  Second stage would be a posterior fusion at least a thoracolumbar pelvic fusion T9 to the pelvis possibly even going up to T4-T6 for complete scoliosis correction.  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  Based upon years of pain discomfort degenerative rotoscoliosis failed conservative care patient wishes to attempt to correct her curvature and hopefully reduce her pain.  Patient wishes to proceed. Attending statement:  I have personally seen this patient, and formed a face to face diagnostic evaluation on this patient on this date.  I have reviewed the PA, NP and or Medical/PA student and/or Resident documentation and agree with the history, physical exam and plan of care except if noted otherwise.      Patient presents for an adult deformity correction/scoliosis surgery.  This will be performed in a staged fashion stage I will be in a left at L5-S1 potentially L4-L5 and then a lateral approach at 2334 and possibly 4 5.  Second stage would be a posterior fusion at least a thoracolumbar pelvic fusion T9 to the pelvis possibly even going up to T4-T6 for complete scoliosis correction.  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  Based upon years of pain discomfort degenerative rotoscoliosis failed conservative care patient wishes to attempt to correct her curvature and hopefully reduce her pain.  Patient wishes to proceed.    Patient postop day #2 from an anterior reconstruction at L2 03465 and 5 1 doing very well we are now going to proceed with the posterior component of a thoracolumbar pelvic fusion.

## 2025-03-07 NOTE — H&P PST ADULT - PROBLEM SELECTOR PLAN 1
Patient scheduled for anterior lumbar interbody fusion L4-L5, L5-S1 Lateral Fusion L2-L3, L3-L4 on 3/24/2025 AND Posterior instrumented FusionT9 Pelvis, Possible T4-Pelvis on 3/26/25 with Dr. Cao.

## 2025-03-07 NOTE — H&P PST ADULT - HISTORY OF PRESENT ILLNESS
65 yo female presents for PST with PMH of HTN, Duodenal spasm, osteoporosis, Scoliosis, s/p b/l shoulder, s/p lumpectomy. Very pleasant woman presents for a surgical conversation with regard to a complex adult deformity correction typeprocedure she presents with her  and her sister for surgical conversations she had a longstanding history of adult deformity/scoliosis is undergone many years of conservative care including injections physical therapy core strengthening home exercise excetra. Based upon progressive pain progressive lumbar spondylosis progressive deformity patient wishes to consider surgical management in order to help her current pain levels deformity and progression of her deformity into the future. She states the symptoms are worsening. The patient mentions the symptoms started  year(s) ago. Pain levels include a current pain level of 3/10, an average pain level of 2/10, a minimum pain level of 1/10 and a maximum pain level of 10/10. She states the symptoms seem to be constant. Modifying factors - worsened by bendingandworsened by lifting. Relieving factors include relieved by exercise regimenandrelieved by heat. No relieving factors are noted. Associated Symptoms: no ataxia,no incontinence,good dexterityandno urinary retention. Patient scheduled for anterior lumbar interbody fusion L4-L5, L5-S1 Lateral Fusion L2-L3, L3-L4 on 3/24/2025 AND Posterior instrumented FusionT9 Pelvis, Possible T4-Pelvis on 3/26/25 with Dr. Cao.           65 yo female presents for PST with PMH of HTN, Duodenal spasm, osteoporosis, Scoliosis, s/p b/l shoulder, s/p lumpectomy. Patient c/o a complex adult deformity r/t scoliosis which she has undergone many years of conservative care including injections physical therapy core strengthening home exercise excetra. Based upon progressive pain progressive lumbar spondylosis progressive deformity patient wishes to consider surgical management in order to help her current pain levels deformity and progression of her deformity into the future. She states the symptoms are worsening. The patient mentions the symptoms started  year(s) ago. Pain levels include a current pain level of 3/10, an average pain level of 2/10, a minimum pain level of 1/10 and a maximum pain level of 10/10. She states the symptoms seem to be constant. Modifying factors - worsened by bending and worsened by lifting. Relieving factors include relieved by exercise regimen and relieved by heat. No relieving factors are noted. Associated Symptoms: no ataxia,no incontinence,good dexterityandno urinary retention. Patient scheduled for anterior lumbar interbody fusion L4-L5, L5-S1 Lateral Fusion L2-L3, L3-L4 on 3/24/2025 AND Posterior instrumented FusionT9 Pelvis, Possible T4-Pelvis on 3/26/25 with Dr. Cao.

## 2025-03-07 NOTE — H&P PST ADULT - RESPIRATORY
normal/clear to auscultation bilaterally/no wheezes/no rales/no rhonchi/no respiratory distress/no use of accessory muscles/airway patent

## 2025-03-07 NOTE — H&P PST ADULT - ENMT
Patient attended Phase 2 Pulmonary Rehab Exercise Session. Further documentation will be scanned into the medical record upon discharge.   negative pharynx/neck

## 2025-03-10 RX ORDER — POVIDONE-IODINE 7.5 %
1 SOLUTION, NON-ORAL TOPICAL ONCE
Refills: 0 | Status: COMPLETED | OUTPATIENT
Start: 2025-03-24 | End: 2025-03-24

## 2025-03-11 ENCOUNTER — APPOINTMENT (OUTPATIENT)
Age: 65
End: 2025-03-11
Payer: COMMERCIAL

## 2025-03-11 VITALS
DIASTOLIC BLOOD PRESSURE: 84 MMHG | HEIGHT: 60 IN | HEART RATE: 68 BPM | TEMPERATURE: 98.5 F | OXYGEN SATURATION: 97 % | SYSTOLIC BLOOD PRESSURE: 132 MMHG | WEIGHT: 94 LBS | BODY MASS INDEX: 18.46 KG/M2

## 2025-03-11 DIAGNOSIS — Z12.11 ENCOUNTER FOR SCREENING FOR MALIGNANT NEOPLASM OF COLON: ICD-10-CM

## 2025-03-11 DIAGNOSIS — Z87.09 PERSONAL HISTORY OF OTHER DISEASES OF THE RESPIRATORY SYSTEM: ICD-10-CM

## 2025-03-11 DIAGNOSIS — Z01.818 ENCOUNTER FOR OTHER PREPROCEDURAL EXAMINATION: ICD-10-CM

## 2025-03-11 DIAGNOSIS — Z01.419 ENCOUNTER FOR GYNECOLOGICAL EXAMINATION (GENERAL) (ROUTINE) W/OUT ABNORMAL FINDINGS: ICD-10-CM

## 2025-03-11 DIAGNOSIS — R79.89 OTHER SPECIFIED ABNORMAL FINDINGS OF BLOOD CHEMISTRY: ICD-10-CM

## 2025-03-11 DIAGNOSIS — Z11.59 ENCOUNTER FOR SCREENING FOR OTHER VIRAL DISEASES: ICD-10-CM

## 2025-03-11 DIAGNOSIS — Z12.4 ENCOUNTER FOR SCREENING FOR MALIGNANT NEOPLASM OF CERVIX: ICD-10-CM

## 2025-03-11 DIAGNOSIS — R21 RASH AND OTHER NONSPECIFIC SKIN ERUPTION: ICD-10-CM

## 2025-03-11 PROCEDURE — 99214 OFFICE O/P EST MOD 30 MIN: CPT

## 2025-03-13 LAB — PLATELET # PLAS AUTO: 345 K/UL

## 2025-03-14 ENCOUNTER — NON-APPOINTMENT (OUTPATIENT)
Age: 65
End: 2025-03-14

## 2025-03-21 NOTE — ASU PATIENT PROFILE, ADULT - NSICDXPASTMEDICALHX_GEN_ALL_CORE_FT
PAST MEDICAL HISTORY:  H/O osteoporosis     HTN (hypertension)     Lumbar spondylosis     Scoliosis

## 2025-03-24 ENCOUNTER — APPOINTMENT (OUTPATIENT)
Dept: ORTHOPEDIC SURGERY | Facility: HOSPITAL | Age: 65
End: 2025-03-24

## 2025-03-24 ENCOUNTER — TRANSCRIPTION ENCOUNTER (OUTPATIENT)
Age: 65
End: 2025-03-24

## 2025-03-24 ENCOUNTER — INPATIENT (INPATIENT)
Facility: HOSPITAL | Age: 65
LOS: 6 days | Discharge: HOME | DRG: 552 | End: 2025-03-31
Attending: ORTHOPAEDIC SURGERY | Admitting: ORTHOPAEDIC SURGERY
Payer: COMMERCIAL

## 2025-03-24 VITALS
OXYGEN SATURATION: 100 % | HEART RATE: 72 BPM | SYSTOLIC BLOOD PRESSURE: 142 MMHG | HEIGHT: 60 IN | WEIGHT: 103.62 LBS | TEMPERATURE: 98 F | RESPIRATION RATE: 12 BRPM | DIASTOLIC BLOOD PRESSURE: 91 MMHG

## 2025-03-24 DIAGNOSIS — Z98.890 OTHER SPECIFIED POSTPROCEDURAL STATES: Chronic | ICD-10-CM

## 2025-03-24 DIAGNOSIS — Z98.1 ARTHRODESIS STATUS: ICD-10-CM

## 2025-03-24 DIAGNOSIS — M41.9 SCOLIOSIS, UNSPECIFIED: ICD-10-CM

## 2025-03-24 LAB
ABO RH CONFIRMATION: SIGNIFICANT CHANGE UP
ALBUMIN SERPL ELPH-MCNC: 3.3 G/DL — SIGNIFICANT CHANGE UP (ref 3.3–5.2)
ALP SERPL-CCNC: 42 U/L — SIGNIFICANT CHANGE UP (ref 40–120)
ALT FLD-CCNC: 15 U/L — SIGNIFICANT CHANGE UP
ANION GAP SERPL CALC-SCNC: 12 MMOL/L — SIGNIFICANT CHANGE UP (ref 5–17)
ANION GAP SERPL CALC-SCNC: 9 MMOL/L — SIGNIFICANT CHANGE UP (ref 5–17)
ANISOCYTOSIS BLD QL: SLIGHT — SIGNIFICANT CHANGE UP
AST SERPL-CCNC: 29 U/L — SIGNIFICANT CHANGE UP
BASOPHILS # BLD AUTO: 0.01 K/UL — SIGNIFICANT CHANGE UP (ref 0–0.2)
BASOPHILS # BLD MANUAL: 0 K/UL — SIGNIFICANT CHANGE UP (ref 0–0.2)
BASOPHILS NFR BLD AUTO: 0.1 % — SIGNIFICANT CHANGE UP (ref 0–2)
BASOPHILS NFR BLD MANUAL: 0 % — SIGNIFICANT CHANGE UP (ref 0–2)
BILIRUB SERPL-MCNC: 0.5 MG/DL — SIGNIFICANT CHANGE UP (ref 0.4–2)
BUN SERPL-MCNC: 10.1 MG/DL — SIGNIFICANT CHANGE UP (ref 8–20)
BUN SERPL-MCNC: 11.5 MG/DL — SIGNIFICANT CHANGE UP (ref 8–20)
CALCIUM SERPL-MCNC: 6.3 MG/DL — CRITICAL LOW (ref 8.4–10.5)
CALCIUM SERPL-MCNC: 8.2 MG/DL — LOW (ref 8.4–10.5)
CHLORIDE SERPL-SCNC: 102 MMOL/L — SIGNIFICANT CHANGE UP (ref 96–108)
CHLORIDE SERPL-SCNC: 111 MMOL/L — HIGH (ref 96–108)
CO2 SERPL-SCNC: 19 MMOL/L — LOW (ref 22–29)
CO2 SERPL-SCNC: 22 MMOL/L — SIGNIFICANT CHANGE UP (ref 22–29)
CREAT SERPL-MCNC: 0.37 MG/DL — LOW (ref 0.5–1.3)
CREAT SERPL-MCNC: 0.46 MG/DL — LOW (ref 0.5–1.3)
EGFR: 105 ML/MIN/1.73M2 — SIGNIFICANT CHANGE UP
EGFR: 105 ML/MIN/1.73M2 — SIGNIFICANT CHANGE UP
EGFR: 111 ML/MIN/1.73M2 — SIGNIFICANT CHANGE UP
EGFR: 111 ML/MIN/1.73M2 — SIGNIFICANT CHANGE UP
EOSINOPHIL # BLD AUTO: 0 K/UL — SIGNIFICANT CHANGE UP (ref 0–0.5)
EOSINOPHIL # BLD MANUAL: 0.81 K/UL — HIGH (ref 0–0.5)
EOSINOPHIL NFR BLD AUTO: 0 % — SIGNIFICANT CHANGE UP (ref 0–6)
EOSINOPHIL NFR BLD MANUAL: 7.7 % — HIGH (ref 0–6)
GAS PNL BLDA: SIGNIFICANT CHANGE UP
GIANT PLATELETS BLD QL SMEAR: PRESENT
GLUCOSE SERPL-MCNC: 137 MG/DL — HIGH (ref 70–99)
GLUCOSE SERPL-MCNC: 152 MG/DL — HIGH (ref 70–99)
HCT VFR BLD CALC: 27.2 % — LOW (ref 34.5–45)
HCT VFR BLD CALC: 27.9 % — LOW (ref 34.5–45)
HGB BLD-MCNC: 9.2 G/DL — LOW (ref 11.5–15.5)
HGB BLD-MCNC: 9.6 G/DL — LOW (ref 11.5–15.5)
HYPOCHROMIA BLD QL: ABNORMAL
IMM GRANULOCYTES # BLD AUTO: 0.06 K/UL — SIGNIFICANT CHANGE UP (ref 0–0.07)
IMM GRANULOCYTES NFR BLD AUTO: 0.6 % — SIGNIFICANT CHANGE UP (ref 0–0.9)
LYMPHOCYTES # BLD AUTO: 0.29 K/UL — LOW (ref 1–3.3)
LYMPHOCYTES # BLD MANUAL: 0.36 K/UL — LOW (ref 1–3.3)
LYMPHOCYTES NFR BLD AUTO: 2.8 % — LOW (ref 13–44)
LYMPHOCYTES NFR BLD MANUAL: 3.4 % — LOW (ref 13–44)
MANUAL NEUTROPHIL BANDS #: 1.34 K/UL — HIGH (ref 0–0.84)
MCHC RBC-ENTMCNC: 31 PG — SIGNIFICANT CHANGE UP (ref 27–34)
MCHC RBC-ENTMCNC: 31.2 PG — SIGNIFICANT CHANGE UP (ref 27–34)
MCHC RBC-ENTMCNC: 33.8 G/DL — SIGNIFICANT CHANGE UP (ref 32–36)
MCHC RBC-ENTMCNC: 34.4 G/DL — SIGNIFICANT CHANGE UP (ref 32–36)
MCV RBC AUTO: 90.6 FL — SIGNIFICANT CHANGE UP (ref 80–100)
MCV RBC AUTO: 91.6 FL — SIGNIFICANT CHANGE UP (ref 80–100)
MICROCYTES BLD QL: SLIGHT — SIGNIFICANT CHANGE UP
MONOCYTES # BLD AUTO: 0.76 K/UL — SIGNIFICANT CHANGE UP (ref 0–0.9)
MONOCYTES # BLD MANUAL: 0.99 K/UL — HIGH (ref 0–0.9)
MONOCYTES NFR BLD AUTO: 7.2 % — SIGNIFICANT CHANGE UP (ref 2–14)
MONOCYTES NFR BLD MANUAL: 9.4 % — SIGNIFICANT CHANGE UP (ref 2–14)
NEUTROPHILS # BLD AUTO: 9.37 K/UL — HIGH (ref 1.8–7.4)
NEUTROPHILS # BLD MANUAL: 7 K/UL — SIGNIFICANT CHANGE UP (ref 1.8–7.4)
NEUTROPHILS NFR BLD AUTO: 89.3 % — HIGH (ref 43–77)
NEUTROPHILS NFR BLD MANUAL: 66.7 % — SIGNIFICANT CHANGE UP (ref 43–77)
NEUTS BAND # BLD: 12.8 % — HIGH (ref 0–8)
NEUTS BAND NFR BLD: 12.8 % — HIGH (ref 0–8)
NRBC # BLD AUTO: 0 K/UL — SIGNIFICANT CHANGE UP (ref 0–0)
NRBC # BLD AUTO: 0 K/UL — SIGNIFICANT CHANGE UP (ref 0–0)
NRBC # FLD: 0 K/UL — SIGNIFICANT CHANGE UP (ref 0–0)
NRBC # FLD: 0 K/UL — SIGNIFICANT CHANGE UP (ref 0–0)
NRBC BLD AUTO-RTO: 0 /100 WBCS — SIGNIFICANT CHANGE UP (ref 0–0)
NRBC BLD AUTO-RTO: 0 /100 WBCS — SIGNIFICANT CHANGE UP (ref 0–0)
PLAT MORPH BLD: NORMAL — SIGNIFICANT CHANGE UP
PLATELET # BLD AUTO: 260 K/UL — SIGNIFICANT CHANGE UP (ref 150–400)
PLATELET # BLD AUTO: 314 K/UL — SIGNIFICANT CHANGE UP (ref 150–400)
PMV BLD: 8.5 FL — SIGNIFICANT CHANGE UP (ref 7–13)
PMV BLD: 8.6 FL — SIGNIFICANT CHANGE UP (ref 7–13)
POLYCHROMASIA BLD QL SMEAR: SLIGHT — SIGNIFICANT CHANGE UP
POTASSIUM SERPL-MCNC: 3.6 MMOL/L — SIGNIFICANT CHANGE UP (ref 3.5–5.3)
POTASSIUM SERPL-MCNC: 4.4 MMOL/L — SIGNIFICANT CHANGE UP (ref 3.5–5.3)
POTASSIUM SERPL-SCNC: 3.6 MMOL/L — SIGNIFICANT CHANGE UP (ref 3.5–5.3)
POTASSIUM SERPL-SCNC: 4.4 MMOL/L — SIGNIFICANT CHANGE UP (ref 3.5–5.3)
PROT SERPL-MCNC: 4.8 G/DL — LOW (ref 6.6–8.7)
RBC # BLD: 2.97 M/UL — LOW (ref 3.8–5.2)
RBC # BLD: 3.08 M/UL — LOW (ref 3.8–5.2)
RBC # FLD: 12.4 % — SIGNIFICANT CHANGE UP (ref 10.3–14.5)
RBC # FLD: 12.4 % — SIGNIFICANT CHANGE UP (ref 10.3–14.5)
RBC BLD AUTO: ABNORMAL
SODIUM SERPL-SCNC: 136 MMOL/L — SIGNIFICANT CHANGE UP (ref 135–145)
SODIUM SERPL-SCNC: 139 MMOL/L — SIGNIFICANT CHANGE UP (ref 135–145)
WBC # BLD: 10.49 K/UL — SIGNIFICANT CHANGE UP (ref 3.8–10.5)
WBC # BLD: 11.29 K/UL — HIGH (ref 3.8–10.5)
WBC # FLD AUTO: 10.49 K/UL — SIGNIFICANT CHANGE UP (ref 3.8–10.5)
WBC # FLD AUTO: 11.29 K/UL — HIGH (ref 3.8–10.5)

## 2025-03-24 PROCEDURE — 22224 OSTEOT DSC ANT 1VRT SGM LMBR: CPT

## 2025-03-24 PROCEDURE — 22558 ARTHRD ANT NTRBD MIN DSC LUM: CPT | Mod: GC,62

## 2025-03-24 PROCEDURE — 22226 OSTEOT DSC ANT 1VRT SGM EA: CPT

## 2025-03-24 PROCEDURE — 22585 ARTHRD ANT NTRBD MIN DSC EA: CPT | Mod: 62

## 2025-03-24 PROCEDURE — 22853 INSJ BIOMECHANICAL DEVICE: CPT

## 2025-03-24 PROCEDURE — 22585 ARTHRD ANT NTRBD MIN DSC EA: CPT | Mod: GC,62

## 2025-03-24 PROCEDURE — 22558 ARTHRD ANT NTRBD MIN DSC LUM: CPT | Mod: 62

## 2025-03-24 DEVICE — IMPLANTABLE DEVICE: Type: IMPLANTABLE DEVICE | Status: FUNCTIONAL

## 2025-03-24 DEVICE — SURGIFOAM 8 X 12.5CM X 10MM (100): Type: IMPLANTABLE DEVICE | Status: FUNCTIONAL

## 2025-03-24 DEVICE — KIT A-LINE 1LUM 20G X 12CM SAFE KIT: Type: IMPLANTABLE DEVICE | Status: FUNCTIONAL

## 2025-03-24 DEVICE — GRAFT FIBERGRAFT PUTTY LRG 11CC: Type: IMPLANTABLE DEVICE | Status: FUNCTIONAL

## 2025-03-24 DEVICE — SPONGE COLLAGEN AVITENE ULTRA 12.5SQCM: Type: IMPLANTABLE DEVICE | Status: FUNCTIONAL

## 2025-03-24 DEVICE — CLIP APPLIER ETHICON LIGACLIP 11.5" MEDIUM: Type: IMPLANTABLE DEVICE | Status: FUNCTIONAL

## 2025-03-24 RX ORDER — PHENYLEPHRINE HCL IN 0.9% NACL 0.5 MG/5ML
0.5 SYRINGE (ML) INTRAVENOUS
Qty: 40 | Refills: 0 | Status: DISCONTINUED | OUTPATIENT
Start: 2025-03-24 | End: 2025-03-25

## 2025-03-24 RX ORDER — ACETAMINOPHEN 500 MG/5ML
975 LIQUID (ML) ORAL ONCE
Refills: 0 | Status: COMPLETED | OUTPATIENT
Start: 2025-03-24 | End: 2025-03-24

## 2025-03-24 RX ORDER — CEFAZOLIN SODIUM IN 0.9 % NACL 3 G/100 ML
2000 INTRAVENOUS SOLUTION, PIGGYBACK (ML) INTRAVENOUS ONCE
Refills: 0 | Status: DISCONTINUED | OUTPATIENT
Start: 2025-03-24 | End: 2025-03-24

## 2025-03-24 RX ORDER — SODIUM CHLORIDE 9 G/1000ML
1000 INJECTION, SOLUTION INTRAVENOUS
Refills: 0 | Status: DISCONTINUED | OUTPATIENT
Start: 2025-03-24 | End: 2025-03-24

## 2025-03-24 RX ORDER — METHOCARBAMOL 500 MG/1
500 TABLET, FILM COATED ORAL EVERY 8 HOURS
Refills: 0 | Status: DISCONTINUED | OUTPATIENT
Start: 2025-03-24 | End: 2025-03-26

## 2025-03-24 RX ORDER — ONDANSETRON HCL/PF 4 MG/2 ML
4 VIAL (ML) INJECTION EVERY 6 HOURS
Refills: 0 | Status: DISCONTINUED | OUTPATIENT
Start: 2025-03-24 | End: 2025-03-24

## 2025-03-24 RX ORDER — SODIUM CHLORIDE 9 G/1000ML
1000 INJECTION, SOLUTION INTRAVENOUS
Refills: 0 | Status: DISCONTINUED | OUTPATIENT
Start: 2025-03-24 | End: 2025-03-25

## 2025-03-24 RX ORDER — IBANDRONATE SODIUM 150 MG/1
1 TABLET ORAL
Refills: 0 | DISCHARGE

## 2025-03-24 RX ORDER — CYCLOBENZAPRINE HYDROCHLORIDE 15 MG/1
1 CAPSULE, EXTENDED RELEASE ORAL
Refills: 0 | DISCHARGE

## 2025-03-24 RX ORDER — ENOXAPARIN SODIUM 100 MG/ML
40 INJECTION SUBCUTANEOUS ONCE
Refills: 0 | Status: COMPLETED | OUTPATIENT
Start: 2025-03-25 | End: 2025-03-25

## 2025-03-24 RX ORDER — APREPITANT 40 MG/1
40 CAPSULE ORAL ONCE
Refills: 0 | Status: COMPLETED | OUTPATIENT
Start: 2025-03-24 | End: 2025-03-24

## 2025-03-24 RX ORDER — MAGNESIUM, ALUMINUM HYDROXIDE 200-200 MG
30 TABLET,CHEWABLE ORAL EVERY 12 HOURS
Refills: 0 | Status: DISCONTINUED | OUTPATIENT
Start: 2025-03-24 | End: 2025-03-26

## 2025-03-24 RX ORDER — CEFAZOLIN SODIUM IN 0.9 % NACL 3 G/100 ML
2000 INTRAVENOUS SOLUTION, PIGGYBACK (ML) INTRAVENOUS ONCE
Refills: 0 | Status: DISCONTINUED | OUTPATIENT
Start: 2025-03-26 | End: 2025-03-26

## 2025-03-24 RX ORDER — HYDROMORPHONE/SOD CHLOR,ISO/PF 2 MG/10 ML
30 SYRINGE (ML) INJECTION
Refills: 0 | Status: DISCONTINUED | OUTPATIENT
Start: 2025-03-24 | End: 2025-03-26

## 2025-03-24 RX ORDER — PREGABALIN 75 MG/1
75 CAPSULE ORAL DAILY
Refills: 0 | Status: DISCONTINUED | OUTPATIENT
Start: 2025-03-24 | End: 2025-03-26

## 2025-03-24 RX ORDER — ACETAMINOPHEN 500 MG/5ML
975 LIQUID (ML) ORAL EVERY 8 HOURS
Refills: 0 | Status: DISCONTINUED | OUTPATIENT
Start: 2025-03-24 | End: 2025-03-26

## 2025-03-24 RX ORDER — KETOROLAC TROMETHAMINE 30 MG/ML
30 INJECTION, SOLUTION INTRAMUSCULAR; INTRAVENOUS EVERY 6 HOURS
Refills: 0 | Status: DISCONTINUED | OUTPATIENT
Start: 2025-03-24 | End: 2025-03-24

## 2025-03-24 RX ORDER — DEXAMETHASONE 0.5 MG/1
4 TABLET ORAL EVERY 6 HOURS
Refills: 0 | Status: COMPLETED | OUTPATIENT
Start: 2025-03-24 | End: 2025-03-25

## 2025-03-24 RX ORDER — ALBUMIN (HUMAN) 12.5 G/50ML
250 INJECTION, SOLUTION INTRAVENOUS ONCE
Refills: 0 | Status: COMPLETED | OUTPATIENT
Start: 2025-03-24 | End: 2025-03-24

## 2025-03-24 RX ORDER — SENNA 187 MG
2 TABLET ORAL AT BEDTIME
Refills: 0 | Status: DISCONTINUED | OUTPATIENT
Start: 2025-03-24 | End: 2025-03-26

## 2025-03-24 RX ORDER — CALCIUM GLUCONATE 20 MG/ML
1 INJECTION, SOLUTION INTRAVENOUS ONCE
Refills: 0 | Status: COMPLETED | OUTPATIENT
Start: 2025-03-24 | End: 2025-03-24

## 2025-03-24 RX ORDER — MELATONIN 5 MG
3 TABLET ORAL AT BEDTIME
Refills: 0 | Status: DISCONTINUED | OUTPATIENT
Start: 2025-03-24 | End: 2025-03-26

## 2025-03-24 RX ORDER — CEFAZOLIN SODIUM IN 0.9 % NACL 3 G/100 ML
2000 INTRAVENOUS SOLUTION, PIGGYBACK (ML) INTRAVENOUS
Refills: 0 | Status: COMPLETED | OUTPATIENT
Start: 2025-03-24 | End: 2025-03-25

## 2025-03-24 RX ORDER — ONDANSETRON HCL/PF 4 MG/2 ML
4 VIAL (ML) INJECTION ONCE
Refills: 0 | Status: DISCONTINUED | OUTPATIENT
Start: 2025-03-24 | End: 2025-03-24

## 2025-03-24 RX ORDER — METOCLOPRAMIDE HCL 10 MG
10 TABLET ORAL ONCE
Refills: 0 | Status: DISCONTINUED | OUTPATIENT
Start: 2025-03-24 | End: 2025-03-24

## 2025-03-24 RX ORDER — PREGABALIN 50 MG/1
1 CAPSULE ORAL
Refills: 0 | DISCHARGE

## 2025-03-24 RX ORDER — MAGNESIUM HYDROXIDE 400 MG/5ML
30 SUSPENSION ORAL EVERY 12 HOURS
Refills: 0 | Status: DISCONTINUED | OUTPATIENT
Start: 2025-03-24 | End: 2025-03-26

## 2025-03-24 RX ADMIN — Medication 975 MILLIGRAM(S): at 06:11

## 2025-03-24 RX ADMIN — Medication 30 MILLILITER(S): at 20:29

## 2025-03-24 RX ADMIN — APREPITANT 40 MILLIGRAM(S): 40 CAPSULE ORAL at 06:11

## 2025-03-24 RX ADMIN — ALBUMIN (HUMAN) 125 MILLILITER(S): 12.5 INJECTION, SOLUTION INTRAVENOUS at 17:32

## 2025-03-24 RX ADMIN — METHOCARBAMOL 500 MILLIGRAM(S): 500 TABLET, FILM COATED ORAL at 22:11

## 2025-03-24 RX ADMIN — Medication 3 MILLILITER(S): at 06:12

## 2025-03-24 RX ADMIN — CALCIUM GLUCONATE 100 GRAM(S): 20 INJECTION, SOLUTION INTRAVENOUS at 19:00

## 2025-03-24 RX ADMIN — Medication 975 MILLIGRAM(S): at 22:08

## 2025-03-24 RX ADMIN — Medication 8.81 MICROGRAM(S)/KG/MIN: at 18:46

## 2025-03-24 RX ADMIN — DEXAMETHASONE 4 MILLIGRAM(S): 0.5 TABLET ORAL at 20:27

## 2025-03-24 RX ADMIN — Medication 30 MILLILITER(S): at 18:07

## 2025-03-24 RX ADMIN — Medication 975 MILLIGRAM(S): at 22:13

## 2025-03-24 RX ADMIN — PREGABALIN 75 MILLIGRAM(S): 75 CAPSULE ORAL at 22:08

## 2025-03-24 RX ADMIN — Medication 1 APPLICATION(S): at 06:12

## 2025-03-24 NOTE — BRIEF OPERATIVE NOTE - OPERATION/FINDINGS
I personally assisted all aspects of positioning supine for anterior portion of the case then prone then for second part lateral for a posterior lumbar approach on a Steris table. Abdomen for first part of the case and then lateral lumbar spine was cleansed with Chlorhexadine by me then cleansed with DuraPrep by RN.   I participated in a positioning of blue drapes and ioban and participated in operative timeout in both stages of the case.  For the lateral aspect of the case, I did superficial closure with 2-0 vicryl and skin with 3-0 monocryl, placed dressings.   For both stages of case I provided visualization and assisted with hemostasis at the end of the procedure by using sequential retraction, continuous suction, utilization of Surgi-Bharath and dry gel foam, packing with Ray-Sophia, and Bovie cauterization.  I assisted with placement of posterolateral bone graft.  I made drain incision cranial to surgical incision, and placed HV drain. The surgical area was irrigated copiously.  I ensured that the patient was delivered safely to PACU after leading the team to place her from Steris table to bed.

## 2025-03-24 NOTE — BRIEF OPERATIVE NOTE - OPERATION/FINDINGS
Retroperitoneum accessed via Pfannenstiel incision with visceral retracted medially. Aortic and caval bifurcations mobilized for exposure of L5/S1 and L4/L5 disc spaces.  Retroperitoneum accessed via Pfannenstiel incision with viscera retracted medially. Aortic and caval bifurcations mobilized for exposure of L5/S1 and L4/L5 disc spaces.

## 2025-03-24 NOTE — DISCHARGE NOTE PROVIDER - NSDCCPCAREPLAN_GEN_ALL_CORE_FT
PRINCIPAL DISCHARGE DIAGNOSIS  Diagnosis: Sagittal plane imbalance  Assessment and Plan of Treatment:       SECONDARY DISCHARGE DIAGNOSES  Diagnosis: Lumbar stenosis with neurogenic claudication  Assessment and Plan of Treatment:     Diagnosis: Lumbar spondylosis  Assessment and Plan of Treatment:     Diagnosis: Multilevel scoliosis  Assessment and Plan of Treatment:

## 2025-03-24 NOTE — PROGRESS NOTE ADULT - SUBJECTIVE AND OBJECTIVE BOX
IKM [id number]    64yFemale  Scoliosis    FH: heart disease (Father, Mother)    FH: lung cancer (Father)    FH: breast cancer (Mother)    H/O osteoporosis    HTN (hypertension)    Scoliosis    Lumbar spondylosis    Chronic radicular low back pain    Rotoscoliosis    Chronic radicular low back pain    Rotoscoliosis    Fusion, spine, thoracolumbar, posterior approach    Sagittal plane imbalance    Scoliosis, unspecified    HTN (hypertension)    Need for prophylactic measure    RUBY (obstructive sleep apnea)    At risk for sleep apnea    Surgical exposure for anterior lumbar interbody fusion (ALIF)    Fusion, spine, lumbar, 3 or more levels, using anterior interbody technique    H/O shoulder surgery    H/O lumpectomy    SCOLIOSIS, UNSPECIFIED    ALIF, 2 levels, using cage    Fusion, spine, lumbar, TLIF, 1-2 levels    Lumbar stenosis with neurogenic claudication    Lumbar spondylosis    Multilevel scoliosis    SysAdmin_VstLnk      STATUS POST: anterior lumbar fusion L4-L5, L5-S1 then lateral fusion L2-L3, L3-L4 for back pain  SUBJECTIVE: Patient seen and examined doing well    Back Pain controlled, lower extremity pain resolving    OBJECTIVE:   T(C): 36.1 (03-24-25 @ 17:10), Max: 36.7 (03-24-25 @ 06:03)  HR: 86 (03-24-25 @ 17:25) (72 - 86)  BP: 125/74 (03-24-25 @ 17:20) (77/65 - 142/91)  RR: 15 (03-24-25 @ 17:25) (12 - 16)  SpO2: 100% (03-24-25 @ 17:25) (100% - 100%)   Constitutional:Pleasant in no acute distress  Psych:A&Ox3  Abdominal: soft and supple non distended  Lymphatics: no pretibial pitting edema  Spine:          Dressing:  clean/dry/intact anterior lumbar/abdomen and left lateral torso.  No drain.                                         Sensation:            Upper extremity          grossly intact manually          Lower extremity           grossly intact manually                               Motor:          Lower extremity                      HF(L2)   KE(L3)    TA(L4)   EHL(L5)  GS(S1)                                                 R        5/5        5/5        5/5       5/5         5/5                                               L         5/5        5/5       5/5       5/5          5/5                                                        [x] warm well perfused; capillary refill <3 seconds                A/P : 64yFemale   S/P anterior then lateral lumbar fusion  POD#0, staged procedure and will go to OR for posterior thoracic to pelvis fusion 3/26/25.   - CBC, BMP stat in PACU due to length of surgery.   -    Pain control- multimodal.  Avoid NSAIDS since they can deter fusion.  PCA over night and can start oral pain med tomorrow.   -    DVT ppx: [ x]SCDs[ x] Pharmacolgic lovenox 40 mg x 1 dose tomorrow then discontinue, patient is going back to OR 3/26/25  -    Periop abx:  Ancef [x ]   -    Check AM labs    -   change dressings  as needed, and prior to discharge home.    -  Resume home meds as appropriate  -PT/OT WBAT, balance and gait  - LSO with OOB not ordered/not mandatory.  Ortho/PT team can reevaluate possible benefit for additional external support daily, in post op period.   -medical follow up- Hospitalist Service Dr Arredondo  - antihypertensive on hold at this time and likely will restart after stage 2 of surgery  - probable home 5 days post op KIM [id number]    64yFemale  Scoliosis    FH: heart disease (Father, Mother)    FH: lung cancer (Father)    FH: breast cancer (Mother)    H/O osteoporosis    HTN (hypertension)    Scoliosis    Lumbar spondylosis    Chronic radicular low back pain    Rotoscoliosis    Chronic radicular low back pain    Rotoscoliosis    Fusion, spine, thoracolumbar, posterior approach    Sagittal plane imbalance    Scoliosis, unspecified    HTN (hypertension)    Need for prophylactic measure    RUBY (obstructive sleep apnea)    At risk for sleep apnea    Surgical exposure for anterior lumbar interbody fusion (ALIF)    Fusion, spine, lumbar, 3 or more levels, using anterior interbody technique    H/O shoulder surgery    H/O lumpectomy    SCOLIOSIS, UNSPECIFIED    ALIF, 2 levels, using cage    Fusion, spine, lumbar, TLIF, 1-2 levels    Lumbar stenosis with neurogenic claudication    Lumbar spondylosis    Multilevel scoliosis    SysAdmin_VstLnk  Home Medications:  arthritis tylenol 2 3x per daily:  (24 Mar 2025 06:32)  Boniva 150 mg oral tablet: 1 tab(s) orally every 4 weeks (24 Mar 2025 06:32)  citracal slow release 1200 mg once:  (24 Mar 2025 06:32)  cyclobenzaprine 10 mg oral tablet: 1 tab(s) orally once a day (24 Mar 2025 06:32)  fish oil once daily:  (24 Mar 2025 06:32)  pregabalin 75 mg oral capsule: 1 cap(s) orally once a day (24 Mar 2025 06:32)  valsartan-hydrochlorothiazide 80 mg-12.5 mg oral tablet: 1 tab(s) orally once a day (at bedtime) (24 Mar 2025 06:32)  vitamin C once daily 1000 mg daily:  (24 Mar 2025 06:32)      STATUS POST: anterior lumbar fusion L4-L5, L5-S1 then lateral fusion L2-L3, L3-L4 for back pain  SUBJECTIVE: Patient seen and examined doing well    Back Pain controlled, lower extremity pain resolving    OBJECTIVE:   T(C): 36.1 (03-24-25 @ 17:10), Max: 36.7 (03-24-25 @ 06:03)  HR: 86 (03-24-25 @ 17:25) (72 - 86)  BP: 125/74 (03-24-25 @ 17:20) (77/65 - 142/91)  RR: 15 (03-24-25 @ 17:25) (12 - 16)  SpO2: 100% (03-24-25 @ 17:25) (100% - 100%)   Constitutional:Pleasant in no acute distress  Psych:A&Ox3  Abdominal: soft and supple non distended  Lymphatics: no pretibial pitting edema  Spine:          Dressing:  clean/dry/intact anterior lumbar/abdomen and left lateral torso.  No drain.                                         Sensation:            Upper extremity          grossly intact manually          Lower extremity           grossly intact manually                               Motor:          Lower extremity              grossly intact manually, patient is still waking from anesthesia.                                                         [x] warm well perfused; capillary refill <3 seconds                         9.2    10.49 )-----------( 260      ( 24 Mar 2025 17:20 )             27.2                  A/P : 64yFemale   S/P anterior then lateral lumbar fusion  POD#0, staged procedure and will go to OR for posterior thoracic to pelvis fusion 3/26/25.   - CBC, BMP stat in PACU due to length of surgery.   -    Pain control- multimodal.  Avoid NSAIDS since they can deter fusion.  PCA over night and can start oral pain med tomorrow.   -    DVT ppx: [ x]SCDs[ x] Pharmacolgic lovenox 40 mg x 1 dose tomorrow then discontinue, patient is going back to OR 3/26/25  -    Periop abx:  Ancef [x ]   -    Check AM labs    -   change dressings  as needed, and prior to discharge home.    -  Resume home meds as appropriate  -PT/OT WBAT, balance and gait  - LSO with OOB not ordered/not mandatory.  Ortho/PT team can reevaluate possible benefit for additional external support daily, in post op period.   -medical follow up- Hospitalist Service Dr Arredondo  - antihypertensive on hold at this time and likely will restart after stage 2 of surgery  - probable home 5 days post op KIM [id number]    64yFemale  Scoliosis    FH: heart disease (Father, Mother)    FH: lung cancer (Father)    FH: breast cancer (Mother)    H/O osteoporosis    HTN (hypertension)    Scoliosis    Lumbar spondylosis    Chronic radicular low back pain    Rotoscoliosis    Chronic radicular low back pain    Rotoscoliosis    Fusion, spine, thoracolumbar, posterior approach    Sagittal plane imbalance    Scoliosis, unspecified    HTN (hypertension)    Need for prophylactic measure    RUBY (obstructive sleep apnea)    At risk for sleep apnea    Surgical exposure for anterior lumbar interbody fusion (ALIF)    Fusion, spine, lumbar, 3 or more levels, using anterior interbody technique    H/O shoulder surgery    H/O lumpectomy    SCOLIOSIS, UNSPECIFIED    ALIF, 2 levels, using cage    Fusion, spine, lumbar, TLIF, 1-2 levels    Lumbar stenosis with neurogenic claudication    Lumbar spondylosis    Multilevel scoliosis    SysAdmin_VstLnk  Home Medications:  arthritis tylenol 2 3x per daily:  (24 Mar 2025 06:32)  Boniva 150 mg oral tablet: 1 tab(s) orally every 4 weeks (24 Mar 2025 06:32)  citracal slow release 1200 mg once:  (24 Mar 2025 06:32)  cyclobenzaprine 10 mg oral tablet: 1 tab(s) orally once a day (24 Mar 2025 06:32)  fish oil once daily:  (24 Mar 2025 06:32)  pregabalin 75 mg oral capsule: 1 cap(s) orally once a day (24 Mar 2025 06:32)  valsartan-hydrochlorothiazide 80 mg-12.5 mg oral tablet: 1 tab(s) orally once a day (at bedtime) (24 Mar 2025 06:32)  vitamin C once daily 1000 mg daily:  (24 Mar 2025 06:32)      STATUS POST: anterior lumbar fusion L4-L5, L5-S1 then lateral fusion L2-L3, L3-L4 for back pain  SUBJECTIVE: Patient seen and examined doing well    Back Pain controlled, lower extremity pain resolving    OBJECTIVE:   T(C): 36.1 (03-24-25 @ 17:10), Max: 36.7 (03-24-25 @ 06:03)  HR: 86 (03-24-25 @ 17:25) (72 - 86)  BP: 125/74 (03-24-25 @ 17:20) (77/65 - 142/91)  RR: 15 (03-24-25 @ 17:25) (12 - 16)  SpO2: 100% (03-24-25 @ 17:25) (100% - 100%)   Constitutional: Pleasant in no acute distress  Psych:A&Ox3  Abdominal: soft and supple non distended  Lymphatics: no pretibial pitting edema  Spine:          Dressing:  clean/dry/intact anterior lumbar/abdomen and left lateral torso.  No drain.                                         Sensation:            Upper extremity          grossly intact manually          Lower extremity           grossly intact manually                               Motor:          Lower extremity              grossly intact manually, patient is still waking from anesthesia.                                                         [x] warm well perfused; capillary refill <3 seconds                         9.2    10.49 )-----------( 260      ( 24 Mar 2025 17:20 )             27.2   03-24    139  |  111[H]  |  10.1  ----------------------------<  137[H]  3.6   |  19.0[L]  |  0.37[L]    Ca    6.3[LL]      24 Mar 2025 17:20                     A/P : 64yFemale   S/P anterior then lateral lumbar fusion  POD#0, staged procedure and will go to OR for posterior thoracic to pelvis fusion 3/26/25.   - CBC, BMP stat in PACU due to length of surgery.   - hypocalcemia s/p supplementation in PACU, BMP recheck sent  - post op hypotension, controlled on pressors- SICU consult.  s/p LR 1 L, albumin and 1 unit PRBC.  Will check CBC post transfusion and in the morning tomorrow.   -    Pain control- multimodal.  Avoid NSAIDS since they can deter fusion.  PCA over night and can start oral pain med tomorrow.   -    DVT ppx: [ x]SCDs[ x] Pharmacolgic lovenox 40 mg x 1 dose tomorrow then discontinue, patient is going back to OR 3/26/25  -    Periop abx:  Ancef [x ]   -    Check AM labs    -   change dressings  as needed, and prior to discharge home.    -  Resume home meds as appropriate  -PT/OT WBAT, balance and gait  - LSO with OOB not ordered/not mandatory.  Ortho/PT team can reevaluate possible benefit for additional external support daily, in post op period.   -medical follow up- Hospitalist Service Dr Arredondo  - antihypertensive on hold at this time and likely will restart after stage 2 of surgery  - probable home 5 days post op KIM [id number]    64yFemale  Scoliosis    FH: heart disease (Father, Mother)    FH: lung cancer (Father)    FH: breast cancer (Mother)    H/O osteoporosis    HTN (hypertension)    Scoliosis    Lumbar spondylosis    Chronic radicular low back pain    Rotoscoliosis    Chronic radicular low back pain    Rotoscoliosis    Fusion, spine, thoracolumbar, posterior approach    Sagittal plane imbalance    Scoliosis, unspecified    HTN (hypertension)    Need for prophylactic measure    RUBY (obstructive sleep apnea)    At risk for sleep apnea    Surgical exposure for anterior lumbar interbody fusion (ALIF)    Fusion, spine, lumbar, 3 or more levels, using anterior interbody technique    H/O shoulder surgery    H/O lumpectomy    SCOLIOSIS, UNSPECIFIED    ALIF, 2 levels, using cage    Fusion, spine, lumbar, TLIF, 1-2 levels    Lumbar stenosis with neurogenic claudication    Lumbar spondylosis    Multilevel scoliosis    SysAdmin_VstLnk  Home Medications:  arthritis tylenol 2 3x per daily:  (24 Mar 2025 06:32)  Boniva 150 mg oral tablet: 1 tab(s) orally every 4 weeks (24 Mar 2025 06:32)  citracal slow release 1200 mg once:  (24 Mar 2025 06:32)  cyclobenzaprine 10 mg oral tablet: 1 tab(s) orally once a day (24 Mar 2025 06:32)  fish oil once daily:  (24 Mar 2025 06:32)  pregabalin 75 mg oral capsule: 1 cap(s) orally once a day (24 Mar 2025 06:32)  valsartan-hydrochlorothiazide 80 mg-12.5 mg oral tablet: 1 tab(s) orally once a day (at bedtime) (24 Mar 2025 06:32)  vitamin C once daily 1000 mg daily:  (24 Mar 2025 06:32)      STATUS POST: anterior lumbar fusion L4-L5, L5-S1 then lateral fusion L2-L3, L3-L4 for back pain  SUBJECTIVE: Patient seen and examined doing well    Back Pain controlled, lower extremity pain resolving    OBJECTIVE:   T(C): 36.1 (03-24-25 @ 17:10), Max: 36.7 (03-24-25 @ 06:03)  HR: 86 (03-24-25 @ 17:25) (72 - 86)  BP: 125/74 (03-24-25 @ 17:20) (77/65 - 142/91)  RR: 15 (03-24-25 @ 17:25) (12 - 16)  SpO2: 100% (03-24-25 @ 17:25) (100% - 100%)   Constitutional: Pleasant in no acute distress  Psych:A&Ox3  Abdominal: soft and supple non distended  Lymphatics: no pretibial pitting edema  Spine:          Dressing:  clean/dry/intact anterior lumbar/abdomen and left lateral torso.  No drain.                                         Sensation:            Upper extremity          grossly intact manually          Lower extremity           grossly intact manually                               Motor:          Lower extremity              grossly intact manually 5/5 hip flexor, knee extensor, dorsi/plantar flexion and ankle eversion bilaterally                                                        [x] warm well perfused; capillary refill <3 seconds                         9.2    10.49 )-----------( 260      ( 24 Mar 2025 17:20 )             27.2   03-24    139  |  111[H]  |  10.1  ----------------------------<  137[H]  3.6   |  19.0[L]  |  0.37[L]    Ca    6.3[LL]      24 Mar 2025 17:20                     A/P : 64yFemale   S/P anterior then lateral lumbar fusion  POD#0, staged procedure and will go to OR for posterior thoracic to pelvis fusion 3/26/25.   - CBC, BMP stat in PACU due to length of surgery.   - hypocalcemia s/p supplementation in PACU, BMP recheck sent  - post op hypotension, controlled on pressors- SICU consult.  s/p LR 1 L, albumin and 1 unit PRBC.  Will check CBC post transfusion and in the morning tomorrow.   -    Pain control- multimodal.  Avoid NSAIDS since they can deter fusion.  PCA over night and can start oral pain med tomorrow.   -    DVT ppx: [ x]SCDs[ x] Pharmacolgic lovenox 40 mg x 1 dose tomorrow then discontinue, patient is going back to OR 3/26/25  -    Periop abx:  Ancef [x ]   -    Check AM labs    -   change dressings  as needed, and prior to discharge home.    -  Resume home meds as appropriate  -PT/OT WBAT, balance and gait  - LSO with OOB not ordered/not mandatory.  Ortho/PT team can reevaluate possible benefit for additional external support daily, in post op period.   -medical follow up- Hospitalist Service Dr Arredondo  - antihypertensive on hold at this time and likely will restart after stage 2 of surgery  - probable home 5 days post op

## 2025-03-24 NOTE — CONSULT NOTE ADULT - NS ATTEND AMEND GEN_ALL_CORE FT
I have seen and examined this patient in PACU.  This is a 64-year-old male s/p anterior spinal fusion for scoliosos.   Post-operatively she is hypotensive requiring phenylephrine.  She has no complaints of pain.  Given blood for post-surgical acute blood loss anemia.  Will bring to SICU for post-operative hemodynamic monitoring.

## 2025-03-24 NOTE — DISCHARGE NOTE PROVIDER - NSDCCPTREATMENT_GEN_ALL_CORE_FT
PRINCIPAL PROCEDURE  Procedure: Fusion, spine, thoracolumbar, posterior approach  Findings and Treatment:       SECONDARY PROCEDURE  Procedure: ALIF, 2 levels, using cage  Findings and Treatment:     Procedure: Fusion, spine, lumbar, TLIF, 1-2 levels  Findings and Treatment:

## 2025-03-24 NOTE — DISCHARGE NOTE PROVIDER - NSDCFUADDINST_GEN_ALL_CORE_FT
Follow-up with Dr. Cao within 7-10 days. Dressing change daily until dry, then leave dressing in place until office follow-up. Pain medications as indicated and titrated to patient's needs. Patient will begin xarelto 10 mg daily x 21 days post-operatively for clot prevention.   Ambulate with assistance of walker. Contact office if the wound becomes red, opens or discharge increases.

## 2025-03-24 NOTE — DISCHARGE NOTE PROVIDER - CARE PROVIDER_API CALL
Gopi Cao  [address] [place] [address]  Phone: [phone]  Fax: (   )    -  Follow Up Time:

## 2025-03-24 NOTE — ASU PREOP CHECKLIST - ALLERGY BAND ON
done PRINCIPAL DISCHARGE DIAGNOSIS  Diagnosis: Intracranial hemorrhage  Assessment and Plan of Treatment:       SECONDARY DISCHARGE DIAGNOSES  Diagnosis: Fall  Assessment and Plan of Treatment:     Diagnosis: Head injury  Assessment and Plan of Treatment:

## 2025-03-24 NOTE — DISCHARGE NOTE PROVIDER - NSDCFUSCHEDAPPT_GEN_ALL_CORE_FT
Gopi Cao  [facility]  ORTHOSURG [address]  Scheduled Appointment: 03/26/2025    Gopi Cao  [facility]  ORTHOSURG [address]  Scheduled Appointment: 04/10/2025    Sophia Whyte  [facility]  INTMED [address] [facility]  Scheduled Appointment: 06/09/2025     Gopi Cao  [facility]  ORTHOSURG [address]  Scheduled Appointment: 04/10/2025    Sophia Whyte  [facility]  INTMED [address]  Scheduled Appointment: 06/09/2025

## 2025-03-24 NOTE — ASU PREOP CHECKLIST - PATIENT SENT TO
Left message for patient requesting her to return my call regarding pending TSH lab ordered by Akhil. Patient needs to get labs done before she get any refills.   operating room

## 2025-03-24 NOTE — CHART NOTE - NSCHARTNOTEFT_GEN_A_CORE
pt seen and examined in preop , plan is for L4-S1 spinal exposure , risk discussed including injury to bowel , ureter , which would require other surgeons to come in to fix if recognized at the time as well as aorta , ivc & iliac vessels which I will fix if recognized at the time . patient understands and wishes to proceed

## 2025-03-24 NOTE — DISCHARGE NOTE PROVIDER - PROVIDER TOKENS
FREE:[LAST:[Nash],FIRST:[Gopi],PHONE:[phone]],FAX:[(   )    -],ADDRESS:[[address] [place] [address]]]

## 2025-03-24 NOTE — CHART NOTE - NSCHARTNOTEFT_GEN_A_CORE
POST-OP NOTE    KIM ROLON | [id number] | [facility] PACU 22RR 24    Procedure: s/p lumbar spine fusion, exposure performed by vascular service    Subjective:  Doing well post-operatively.  She denies any pain at this time while at rest.  Denies nausea, vomiting, lightheadedness, fevers, chills.    Vital Signs Last 24 Hrs  T(C): 36.8 (24 Mar 2025 21:15), Max: 36.9 (24 Mar 2025 20:00)  T(F): 98.2 (24 Mar 2025 21:15), Max: 98.5 (24 Mar 2025 20:00)  HR: 74 (24 Mar 2025 22:15) (72 - 88)  BP: 106/58 (24 Mar 2025 22:15) (77/65 - 142/91)  BP(mean): 79 (24 Mar 2025 20:00) (69 - 100)  RR: 16 (24 Mar 2025 22:15) (12 - 20)  SpO2: 100% (24 Mar 2025 22:15) (100% - 100%)    Parameters below as of 24 Mar 2025 22:15  Patient On (Oxygen Delivery Method): room air      I&O's Summary    24 Mar 2025 07:01  -  24 Mar 2025 22:50  --------------------------------------------------------  IN: 408.8 mL / OUT: 125 mL / NET: 283.8 mL                            9.6    11.29 )-----------( 314      ( 24 Mar 2025 19:30 )             27.9     03-24    136  |  102  |  11.5  ----------------------------<  152[H]  4.4   |  22.0  |  0.46[L]    Ca    8.2[L]      24 Mar 2025 19:30    TPro  4.8[L]  /  Alb  3.3  /  TBili  0.5  /  DBili  x   /  AST  29  /  ALT  15  /  AlkPhos  42  03-24       PHYSICAL EXAM:  Gen: NAD  Resp: breathing easily, no stridor  CV: RRR  Abdomen: soft, nontender, nondistended, mepilex dressings are c/d/i  Feet: B/L palpable DP pulses  Neuro: sensorimotor intact in all 4 extremities    A: 64F POD 0 s/p lumbar fusion with ortho spine, exposure performed by vascular.  She is currently requiring a small dose of moisés but is otherwise stable and feels well.    P:  - Multimodal pain control  - Peripheral vascular checks  - Diet per primary  - Rest of care per SICU, appreciate their management

## 2025-03-24 NOTE — ASU PREOP CHECKLIST - HEIGHT IN CM
Baseline phase: . At 0 minutes, 0 seconds of the current phase:   HR: 75 bpm   BP: 116/83 mmHg No symptoms were observed.  152.4

## 2025-03-24 NOTE — DISCHARGE NOTE PROVIDER - HOSPITAL COURSE
Patient was admitted to [facility] for elective lumbar laminectomy and fusion to address leg and back pain.  Surgery went without complication and they will be discharged home after she is ready from an ADL standpoint and pain is controlled and once drain is pulled.   She will be discharged on  methocarbamol, oxycodone, tylenol and lyrica as well as lovenox 40mg for 28 days post op for DVTP.  She can shower post op day 5 and her dressing will be changed in the office next week on post op appointment. Patient was admitted to [facility] for elective lumbar laminectomy and fusion to address leg and back pain.  Surgery went without complication and they will be discharged home after she is ready from an ADL standpoint and pain is controlled and once drain is pulled.   She will be discharged on  methocarbamol, oxycodone, tylenol and lyrica as well as lovenox 40mg for 28 days post op for DVTP.  She can shower post op day 5 and her dressing will be changed in the office next week on post op appointment.																																																																																																																																																																																																														 Patient was admitted to [facility] for elective lumbar laminectomy and fusion to address leg and back pain.  Surgery went without complication and they will be discharged home after she is ready from an ADL standpoint and pain is controlled and once drain is pulled.   She will be discharged on  methocarbamol, oxycodone, tylenol and lyrica as well asxarelto 10 mg x21 days post op for DVTP.  She can shower post op day 5 and her dressing will be changed in the office next week on post op appointment.

## 2025-03-24 NOTE — DISCHARGE NOTE PROVIDER - NSDCMRMEDTOKEN_GEN_ALL_CORE_FT
arthritis tylenol 2 3x per daily:   Boniva 150 mg oral tablet: 1 tab(s) orally every 4 weeks  citracal slow release 1200 mg once:   cyclobenzaprine 10 mg oral tablet: 1 tab(s) orally once a day  fish oil once daily:   pregabalin 75 mg oral capsule: 1 cap(s) orally once a day  valsartan-hydrochlorothiazide 80 mg-12.5 mg oral tablet: 1 tab(s) orally once a day (at bedtime)  vitamin C once daily 1000 mg daily:    acetaminophen 325 mg oral tablet: 3 tab(s) orally every 8 hours  Boniva 150 mg oral tablet: 1 tab(s) orally every 4 weeks  citracal slow release 1200 mg once:   fish oil once daily:   methocarbamol 500 mg oral tablet: 1 tab(s) orally every 8 hours MDD: 3  Narcan 4 mg/0.1 mL nasal spray: 1 spray(s) intranasally once  oxyCODONE 5 mg oral tablet: 1 tab(s) orally every 6 hours as needed for  moderate pain MDD: 4  pantoprazole 40 mg oral delayed release tablet: 1 tab(s) orally once a day (before a meal)  pregabalin 75 mg oral capsule: 1 cap(s) orally once a day MDD: 1  Senna S 50 mg-8.6 mg oral tablet: 2 tab(s) orally once a day (at bedtime)  valsartan-hydrochlorothiazide 80 mg-12.5 mg oral tablet: 1 tab(s) orally once a day (at bedtime)  vitamin C once daily 1000 mg daily:   Xarelto 10 mg oral tablet: 1 tab(s) orally once a day MDD: 1

## 2025-03-25 LAB
ALBUMIN SERPL ELPH-MCNC: 3.4 G/DL — SIGNIFICANT CHANGE UP (ref 3.3–5.2)
ALP SERPL-CCNC: 42 U/L — SIGNIFICANT CHANGE UP (ref 40–120)
ALT FLD-CCNC: 18 U/L — SIGNIFICANT CHANGE UP
ANION GAP SERPL CALC-SCNC: 11 MMOL/L — SIGNIFICANT CHANGE UP (ref 5–17)
ANION GAP SERPL CALC-SCNC: 11 MMOL/L — SIGNIFICANT CHANGE UP (ref 5–17)
APTT BLD: 23.7 SEC — LOW (ref 24.5–35.6)
AST SERPL-CCNC: 41 U/L — HIGH
BILIRUB SERPL-MCNC: 1 MG/DL — SIGNIFICANT CHANGE UP (ref 0.4–2)
BUN SERPL-MCNC: 11.8 MG/DL — SIGNIFICANT CHANGE UP (ref 8–20)
BUN SERPL-MCNC: 13 MG/DL — SIGNIFICANT CHANGE UP (ref 8–20)
CALCIUM SERPL-MCNC: 7.9 MG/DL — LOW (ref 8.4–10.5)
CALCIUM SERPL-MCNC: 8.1 MG/DL — LOW (ref 8.4–10.5)
CHLORIDE SERPL-SCNC: 101 MMOL/L — SIGNIFICANT CHANGE UP (ref 96–108)
CHLORIDE SERPL-SCNC: 102 MMOL/L — SIGNIFICANT CHANGE UP (ref 96–108)
CO2 SERPL-SCNC: 23 MMOL/L — SIGNIFICANT CHANGE UP (ref 22–29)
CO2 SERPL-SCNC: 23 MMOL/L — SIGNIFICANT CHANGE UP (ref 22–29)
CREAT SERPL-MCNC: 0.4 MG/DL — LOW (ref 0.5–1.3)
CREAT SERPL-MCNC: 0.42 MG/DL — LOW (ref 0.5–1.3)
EGFR: 108 ML/MIN/1.73M2 — SIGNIFICANT CHANGE UP
EGFR: 108 ML/MIN/1.73M2 — SIGNIFICANT CHANGE UP
EGFR: 109 ML/MIN/1.73M2 — SIGNIFICANT CHANGE UP
EGFR: 109 ML/MIN/1.73M2 — SIGNIFICANT CHANGE UP
GLUCOSE SERPL-MCNC: 143 MG/DL — HIGH (ref 70–99)
GLUCOSE SERPL-MCNC: 147 MG/DL — HIGH (ref 70–99)
HCT VFR BLD CALC: 30.9 % — LOW (ref 34.5–45)
HCT VFR BLD CALC: 32.3 % — LOW (ref 34.5–45)
HGB BLD-MCNC: 10.8 G/DL — LOW (ref 11.5–15.5)
HGB BLD-MCNC: 11.2 G/DL — LOW (ref 11.5–15.5)
INR BLD: 0.97 RATIO — SIGNIFICANT CHANGE UP (ref 0.85–1.16)
LACTATE SERPL-SCNC: 1.9 MMOL/L — SIGNIFICANT CHANGE UP (ref 0.5–2)
MAGNESIUM SERPL-MCNC: 1.6 MG/DL — SIGNIFICANT CHANGE UP (ref 1.6–2.6)
MAGNESIUM SERPL-MCNC: 2.4 MG/DL — SIGNIFICANT CHANGE UP (ref 1.6–2.6)
MCHC RBC-ENTMCNC: 30.3 PG — SIGNIFICANT CHANGE UP (ref 27–34)
MCHC RBC-ENTMCNC: 30.4 PG — SIGNIFICANT CHANGE UP (ref 27–34)
MCHC RBC-ENTMCNC: 34.7 G/DL — SIGNIFICANT CHANGE UP (ref 32–36)
MCHC RBC-ENTMCNC: 35 G/DL — SIGNIFICANT CHANGE UP (ref 32–36)
MCV RBC AUTO: 86.6 FL — SIGNIFICANT CHANGE UP (ref 80–100)
MCV RBC AUTO: 87.5 FL — SIGNIFICANT CHANGE UP (ref 80–100)
MRSA PCR RESULT.: SIGNIFICANT CHANGE UP
NRBC # BLD AUTO: 0 K/UL — SIGNIFICANT CHANGE UP (ref 0–0)
NRBC # BLD AUTO: 0 K/UL — SIGNIFICANT CHANGE UP (ref 0–0)
NRBC # FLD: 0 K/UL — SIGNIFICANT CHANGE UP (ref 0–0)
NRBC # FLD: 0 K/UL — SIGNIFICANT CHANGE UP (ref 0–0)
NRBC BLD AUTO-RTO: 0 /100 WBCS — SIGNIFICANT CHANGE UP (ref 0–0)
NRBC BLD AUTO-RTO: 0 /100 WBCS — SIGNIFICANT CHANGE UP (ref 0–0)
PHOSPHATE SERPL-MCNC: 3 MG/DL — SIGNIFICANT CHANGE UP (ref 2.4–4.7)
PHOSPHATE SERPL-MCNC: 3.1 MG/DL — SIGNIFICANT CHANGE UP (ref 2.4–4.7)
PLATELET # BLD AUTO: 289 K/UL — SIGNIFICANT CHANGE UP (ref 150–400)
PLATELET # BLD AUTO: 305 K/UL — SIGNIFICANT CHANGE UP (ref 150–400)
PMV BLD: 8.5 FL — SIGNIFICANT CHANGE UP (ref 7–13)
PMV BLD: 8.7 FL — SIGNIFICANT CHANGE UP (ref 7–13)
POTASSIUM SERPL-MCNC: 4.2 MMOL/L — SIGNIFICANT CHANGE UP (ref 3.5–5.3)
POTASSIUM SERPL-MCNC: 4.3 MMOL/L — SIGNIFICANT CHANGE UP (ref 3.5–5.3)
POTASSIUM SERPL-SCNC: 4.2 MMOL/L — SIGNIFICANT CHANGE UP (ref 3.5–5.3)
POTASSIUM SERPL-SCNC: 4.3 MMOL/L — SIGNIFICANT CHANGE UP (ref 3.5–5.3)
PROT SERPL-MCNC: 4.9 G/DL — LOW (ref 6.6–8.7)
PROTHROM AB SERPL-ACNC: 11.3 SEC — SIGNIFICANT CHANGE UP (ref 9.9–13.4)
RBC # BLD: 3.57 M/UL — LOW (ref 3.8–5.2)
RBC # BLD: 3.69 M/UL — LOW (ref 3.8–5.2)
RBC # FLD: 14.2 % — SIGNIFICANT CHANGE UP (ref 10.3–14.5)
RBC # FLD: 14.5 % — SIGNIFICANT CHANGE UP (ref 10.3–14.5)
S AUREUS DNA NOSE QL NAA+PROBE: SIGNIFICANT CHANGE UP
SODIUM SERPL-SCNC: 135 MMOL/L — SIGNIFICANT CHANGE UP (ref 135–145)
SODIUM SERPL-SCNC: 136 MMOL/L — SIGNIFICANT CHANGE UP (ref 135–145)
WBC # BLD: 10.29 K/UL — SIGNIFICANT CHANGE UP (ref 3.8–10.5)
WBC # BLD: 10.61 K/UL — HIGH (ref 3.8–10.5)
WBC # FLD AUTO: 10.29 K/UL — SIGNIFICANT CHANGE UP (ref 3.8–10.5)
WBC # FLD AUTO: 10.61 K/UL — HIGH (ref 3.8–10.5)

## 2025-03-25 PROCEDURE — 99291 CRITICAL CARE FIRST HOUR: CPT

## 2025-03-25 RX ORDER — SODIUM CHLORIDE 9 G/1000ML
500 INJECTION, SOLUTION INTRAVENOUS ONCE
Refills: 0 | Status: COMPLETED | OUTPATIENT
Start: 2025-03-25 | End: 2025-03-25

## 2025-03-25 RX ORDER — SODIUM CHLORIDE 9 G/1000ML
1000 INJECTION, SOLUTION INTRAVENOUS
Refills: 0 | Status: DISCONTINUED | OUTPATIENT
Start: 2025-03-25 | End: 2025-03-26

## 2025-03-25 RX ORDER — SODIUM CHLORIDE 9 G/1000ML
1000 INJECTION, SOLUTION INTRAVENOUS
Refills: 0 | Status: DISCONTINUED | OUTPATIENT
Start: 2025-03-25 | End: 2025-03-25

## 2025-03-25 RX ORDER — INFLUENZA A VIRUS A/IDAHO/07/2018 (H1N1) ANTIGEN (MDCK CELL DERIVED, PROPIOLACTONE INACTIVATED, INFLUENZA A VIRUS A/INDIANA/08/2018 (H3N2) ANTIGEN (MDCK CELL DERIVED, PROPIOLACTONE INACTIVATED), INFLUENZA B VIRUS B/SINGAPORE/INFTT-16-0610/2016 ANTIGEN (MDCK CELL DERIVED, PROPIOLACTONE INACTIVATED), INFLUENZA B VIRUS B/IOWA/06/2017 ANTIGEN (MDCK CELL DERIVED, PROPIOLACTONE INACTIVATED) 15; 15; 15; 15 UG/.5ML; UG/.5ML; UG/.5ML; UG/.5ML
0.5 INJECTION, SUSPENSION INTRAMUSCULAR ONCE
Refills: 0 | Status: DISCONTINUED | OUTPATIENT
Start: 2025-03-25 | End: 2025-03-31

## 2025-03-25 RX ORDER — MAGNESIUM SULFATE 500 MG/ML
2 SYRINGE (ML) INJECTION ONCE
Refills: 0 | Status: COMPLETED | OUTPATIENT
Start: 2025-03-25 | End: 2025-03-25

## 2025-03-25 RX ORDER — SODIUM CHLORIDE 9 G/1000ML
1000 INJECTION, SOLUTION INTRAVENOUS ONCE
Refills: 0 | Status: COMPLETED | OUTPATIENT
Start: 2025-03-25 | End: 2025-03-25

## 2025-03-25 RX ORDER — PHENYLEPHRINE HCL IN 0.9% NACL 0.5 MG/5ML
0.52 SYRINGE (ML) INTRAVENOUS
Qty: 40 | Refills: 0 | Status: DISCONTINUED | OUTPATIENT
Start: 2025-03-25 | End: 2025-03-25

## 2025-03-25 RX ADMIN — SODIUM CHLORIDE 75 MILLILITER(S): 9 INJECTION, SOLUTION INTRAVENOUS at 06:29

## 2025-03-25 RX ADMIN — Medication 30 MILLILITER(S): at 19:14

## 2025-03-25 RX ADMIN — DEXAMETHASONE 4 MILLIGRAM(S): 0.5 TABLET ORAL at 08:53

## 2025-03-25 RX ADMIN — Medication 975 MILLIGRAM(S): at 13:21

## 2025-03-25 RX ADMIN — Medication 30 MILLILITER(S): at 07:19

## 2025-03-25 RX ADMIN — PREGABALIN 75 MILLIGRAM(S): 75 CAPSULE ORAL at 11:36

## 2025-03-25 RX ADMIN — SODIUM CHLORIDE 1000 MILLILITER(S): 9 INJECTION, SOLUTION INTRAVENOUS at 01:49

## 2025-03-25 RX ADMIN — Medication 40 MILLIGRAM(S): at 06:09

## 2025-03-25 RX ADMIN — Medication 2 TABLET(S): at 21:15

## 2025-03-25 RX ADMIN — Medication 1 APPLICATION(S): at 11:38

## 2025-03-25 RX ADMIN — Medication 975 MILLIGRAM(S): at 13:56

## 2025-03-25 RX ADMIN — Medication 2000 MILLIGRAM(S): at 06:08

## 2025-03-25 RX ADMIN — METHOCARBAMOL 500 MILLIGRAM(S): 500 TABLET, FILM COATED ORAL at 21:15

## 2025-03-25 RX ADMIN — Medication 975 MILLIGRAM(S): at 06:13

## 2025-03-25 RX ADMIN — METHOCARBAMOL 500 MILLIGRAM(S): 500 TABLET, FILM COATED ORAL at 13:21

## 2025-03-25 RX ADMIN — Medication 8.81 MICROGRAM(S)/KG/MIN: at 06:29

## 2025-03-25 RX ADMIN — Medication 975 MILLIGRAM(S): at 06:08

## 2025-03-25 RX ADMIN — SODIUM CHLORIDE 500 MILLILITER(S): 9 INJECTION, SOLUTION INTRAVENOUS at 23:55

## 2025-03-25 RX ADMIN — ENOXAPARIN SODIUM 40 MILLIGRAM(S): 100 INJECTION SUBCUTANEOUS at 15:50

## 2025-03-25 RX ADMIN — Medication 975 MILLIGRAM(S): at 21:15

## 2025-03-25 RX ADMIN — SODIUM CHLORIDE 1000 MILLILITER(S): 9 INJECTION, SOLUTION INTRAVENOUS at 06:08

## 2025-03-25 RX ADMIN — DEXAMETHASONE 4 MILLIGRAM(S): 0.5 TABLET ORAL at 13:22

## 2025-03-25 RX ADMIN — SODIUM CHLORIDE 500 MILLILITER(S): 9 INJECTION, SOLUTION INTRAVENOUS at 16:35

## 2025-03-25 RX ADMIN — DEXAMETHASONE 4 MILLIGRAM(S): 0.5 TABLET ORAL at 01:34

## 2025-03-25 RX ADMIN — Medication 975 MILLIGRAM(S): at 22:15

## 2025-03-25 RX ADMIN — Medication 25 GRAM(S): at 01:34

## 2025-03-25 RX ADMIN — METHOCARBAMOL 500 MILLIGRAM(S): 500 TABLET, FILM COATED ORAL at 06:08

## 2025-03-25 RX ADMIN — Medication 2000 MILLIGRAM(S): at 00:07

## 2025-03-25 RX ADMIN — Medication 30 MILLILITER(S): at 01:23

## 2025-03-25 NOTE — PROGRESS NOTE ADULT - ASSESSMENT
attending statement:  I have personally seen this patient, and formed a face to face diagnostic evaluation on this patient on this date.  I have reviewed the PA, NP and or Medical/PA student and/or Resident documentation and agree with the history, physical exam and plan of care except if noted otherwise.      Patient doing very well at this point in time.  Preparation for posterior Cece correction and fusion March 26/tomorrow.

## 2025-03-25 NOTE — CHART NOTE - NSCHARTNOTEFT_GEN_A_CORE
69-year-old female with a PMH of HTN, osteoporosis, and scoliosis, and admitted for surgical correction of progressive adult spinal deformity. Underwent anterior lumbar interbody fusion L4-L5, L5-S1, and lateral fusion L2-L3, L3-L4 on 3/24/25. Planned for posterior instrumented fusion T9-pelvis (possible T4-pelvis) on 3/26/2 with postoperative hypotension and hypovolemic shock post-op.    3/25: Received 1 unit PRBC and 2L IVF post-op for hypotension. Started on norepinephrine briefly, now off.    Neuro:  A&O x3. No focal deficits.  #Post-op pain: PCA in place. Multimodal regimen with Tylenol, Dilaudid, pregabalin, and Robaxin.  #Q4 neuro checks: Continue routine monitoring.    Pulm:  Breathing comfortably on room air.  #Pulmonary toilet: Encourage incentive spirometry. Monitor for signs of atelectasis or hypoventilation.    Cardiac:  NSR. Off norepinephrine.  #Hypovolemic shock: Resolved with fluid resuscitation and 1 unit PRBC. Continue monitoring MAP >65.  #Hypertension: Resume home valsartan-HCTZ once euvolemic and stable. 69-year-old female with a PMH of HTN, osteoporosis, and scoliosis, and admitted for surgical correction of progressive adult spinal deformity. Underwent anterior lumbar interbody fusion L4-L5, L5-S1, and lateral fusion L2-L3, L3-L4 on 3/24/25 with postoperative hypotension and hypovolemic shock post-op. Received 1 unit PRBC and 2L IVF post-op for hypotension. Started on norepinephrine briefly, now off.   Planned for posterior instrumented fusion T9-pelvis (possible T4-pelvis) on 3/26/2   Patient is stable to RTOR from SICU perspective, no contraindications.

## 2025-03-25 NOTE — PROGRESS NOTE ADULT - SUBJECTIVE AND OBJECTIVE BOX
KIM ROLON    [id number]    History: 64y Female is status post lateral fusion L2-4, ALIF L4-S1 POD # 1.  Patient is doing well and is comfortable. The patient's pain is controlled using the prescribed pain medications. The patient is participating in physical therapy. Denies nausea, vomiting, chest pain, shortness of breath, abdominal pain or fever. No new complaints.    Vital Signs Last 24 Hrs  T(C): 36.9 (25 Mar 2025 11:18), Max: 37.4 (25 Mar 2025 07:29)  T(F): 98.5 (25 Mar 2025 11:18), Max: 99.3 (25 Mar 2025 07:29)  HR: 87 (25 Mar 2025 12:30) (61 - 93)  BP: 109/66 (25 Mar 2025 00:30) (77/65 - 125/74)  BP(mean): 80 (25 Mar 2025 00:30) (69 - 100)  RR: 25 (25 Mar 2025 12:30) (10 - 25)  SpO2: 97% (25 Mar 2025 12:30) (92% - 100%)    Parameters below as of 25 Mar 2025 12:00  Patient On (Oxygen Delivery Method): room air                              10.8   10.29 )-----------( 289      ( 25 Mar 2025 03:30 )             30.9     03-25    135  |  101  |  11.8  ----------------------------<  143[H]  4.2   |  23.0  |  0.40[L]    Ca    7.9[L]      25 Mar 2025 03:30  Phos  3.0     03-25  Mg     2.4     03-25    TPro  4.9[L]  /  Alb  3.4  /  TBili  1.0  /  DBili  x   /  AST  41[H]  /  ALT  18  /  AlkPhos  42  03-25      MEDICATIONS  (STANDING):  acetaminophen     Tablet .. 975 milliGRAM(s) Oral every 8 hours  chlorhexidine 2% Cloths 1 Application(s) Topical daily  chlorhexidine 2% Cloths 1 Application(s) Topical daily  dexAMETHasone  Injectable 4 milliGRAM(s) IV Push every 6 hours  dextrose 5% + lactated ringers. 1000 milliLiter(s) (75 mL/Hr) IV Continuous <Continuous>  enoxaparin Injectable 40 milliGRAM(s) SubCutaneous once  HYDROmorphone PCA (1 mG/mL) 30 milliLiter(s) PCA Continuous PCA Continuous  influenza  Vaccine (HIGH DOSE) 0.5 milliLiter(s) IntraMuscular once  methocarbamol 500 milliGRAM(s) Oral every 8 hours  pantoprazole    Tablet 40 milliGRAM(s) Oral before breakfast  phenylephrine    Infusion 0.517 MICROgram(s)/kG/Min (8.81 mL/Hr) IV Continuous <Continuous>  pregabalin 75 milliGRAM(s) Oral daily  senna 2 Tablet(s) Oral at bedtime    MEDICATIONS  (PRN):  aluminum hydroxide/magnesium hydroxide/simethicone Suspension 30 milliLiter(s) Oral every 12 hours PRN Indigestion  magnesium hydroxide Suspension 30 milliLiter(s) Oral every 12 hours PRN Constipation  melatonin 3 milliGRAM(s) Oral at bedtime PRN Insomnia      Vital Signs Last 24 Hrs  T(C): 36.9 (25 Mar 2025 11:18), Max: 37.4 (25 Mar 2025 07:29)  T(F): 98.5 (25 Mar 2025 11:18), Max: 99.3 (25 Mar 2025 07:29)  HR: 87 (25 Mar 2025 12:30) (61 - 93)  BP: 109/66 (25 Mar 2025 00:30) (77/65 - 125/74)  BP(mean): 80 (25 Mar 2025 00:30) (69 - 100)  RR: 25 (25 Mar 2025 12:30) (10 - 25)  SpO2: 97% (25 Mar 2025 12:30) (92% - 100%)    Parameters below as of 25 Mar 2025 12:00  Patient On (Oxygen Delivery Method): room air      Daily Height in cm: 152.4 (25 Mar 2025 01:23)    Daily     Appearance: Alert, responsive, in no acute distress.    Skin: no rash on visible skin. Skin is clean, dry and intact. No bleeding. No abrasions. No ulcerations.    Musculoskeletal:         Abdomen: Mepilex dressing clean, dry, intact.       Lumbar: lateral Mepilex dressing clean, dry, intact.       Left Lower Extremity: Sensation is grossly intact to light touch. 2+ distal pulses. Cap refill < 2 sec.        Right Lower Extremity: Sensation is grossly intact to light touch. 2+ distal pulses. Cap refill < 2 sec.            Motor exam: [  ]             [ ] Lower extremity                     HF(l2)    KE(l3)   TA(l4)  EHL(l5)    GS(s1)                                                 R        5/5        5/5        5/5       5/5         5/5                                               L         5/5        5/5       5/5       5/5          5/5    Primary Orthopedic Assessment:  • 64y Female is status post lateral fusion L2-4, ALIF L4-S1 POD # 1.     Secondary  Orthopedic Assessment(s):   •     Secondary  Medical Assessment(s):   •     Plan:   • DVT prophylaxis as prescribed, including use of compression devices and ankle pumps  • Continue physical therapy  • Out of Bed as tolerated with assistance of a walker  • Incentive spirometry encouraged  • Pain control as clinically indicated  • Hypotension management as per SICU  • Pre-op for posterior fusion tomorrow if SICU stable

## 2025-03-25 NOTE — PATIENT PROFILE ADULT - VISION (WITH CORRECTIVE LENSES IF THE PATIENT USUALLY WEARS THEM):
OB here for routine exam and follow up medication given.  BF info on [facility] and [facility] given to ptEspinoza oates   Normal vision: sees adequately in most situations; can see medication labels, newsprint

## 2025-03-25 NOTE — PROGRESS NOTE ADULT - SUBJECTIVE AND OBJECTIVE BOX
Subjective: Patient seen and examined at bedside. No overnight events. Patient Doing well post op. abdomen soft, access site C/D/I.  Denies nausea, vomiting, lightheadedness, fevers, chills.          MEDICATIONS  (STANDING):  acetaminophen     Tablet .. 975 milliGRAM(s) Oral every 8 hours  chlorhexidine 2% Cloths 1 Application(s) Topical daily  chlorhexidine 2% Cloths 1 Application(s) Topical daily  dexAMETHasone  Injectable 4 milliGRAM(s) IV Push every 6 hours  dextrose 5% + lactated ringers. 1000 milliLiter(s) (75 mL/Hr) IV Continuous <Continuous>  enoxaparin Injectable 40 milliGRAM(s) SubCutaneous once  HYDROmorphone PCA (1 mG/mL) 30 milliLiter(s) PCA Continuous PCA Continuous  influenza  Vaccine (HIGH DOSE) 0.5 milliLiter(s) IntraMuscular once  methocarbamol 500 milliGRAM(s) Oral every 8 hours  pantoprazole    Tablet 40 milliGRAM(s) Oral before breakfast  phenylephrine    Infusion 0.517 MICROgram(s)/kG/Min (8.81 mL/Hr) IV Continuous <Continuous>  pregabalin 75 milliGRAM(s) Oral daily  senna 2 Tablet(s) Oral at bedtime    MEDICATIONS  (PRN):  aluminum hydroxide/magnesium hydroxide/simethicone Suspension 30 milliLiter(s) Oral every 12 hours PRN Indigestion  magnesium hydroxide Suspension 30 milliLiter(s) Oral every 12 hours PRN Constipation  melatonin 3 milliGRAM(s) Oral at bedtime PRN Insomnia      Vital Signs Last 24 Hrs  T(C): 36.9 (25 Mar 2025 11:18), Max: 37.4 (25 Mar 2025 07:29)  T(F): 98.5 (25 Mar 2025 11:18), Max: 99.3 (25 Mar 2025 07:29)  HR: 80 (25 Mar 2025 10:45) (61 - 93)  BP: 109/66 (25 Mar 2025 00:30) (77/65 - 125/74)  BP(mean): 80 (25 Mar 2025 00:30) (69 - 100)  RR: 18 (25 Mar 2025 10:45) (10 - 23)  SpO2: 97% (25 Mar 2025 10:45) (92% - 100%)    Parameters below as of 25 Mar 2025 08:00  Patient On (Oxygen Delivery Method): room air      PHYSICAL EXAM:  Gen: NAD  Resp: breathing easily, no stridor  CV: RRR  Abdomen: soft, nontender, nondistended, mepilex dressings are c/d/i  Feet: B/L palpable DP pulses  Neuro: sensorimotor intact in all 4 extremities      LABS:                        10.8   10.29 )-----------( 289      ( 25 Mar 2025 03:30 )             30.9     03-25    135  |  101  |  11.8  ----------------------------<  143[H]  4.2   |  23.0  |  0.40[L]    Ca    7.9[L]      25 Mar 2025 03:30  Phos  3.0     03-25  Mg     2.4     03-25    TPro  4.9[L]  /  Alb  3.4  /  TBili  1.0  /  DBili  x   /  AST  41[H]  /  ALT  18  /  AlkPhos  42  03-25    PT/INR - ( 25 Mar 2025 00:00 )   PT: 11.3 sec;   INR: 0.97 ratio         PTT - ( 25 Mar 2025 00:00 )  PTT:23.7 sec  Urinalysis Basic - ( 25 Mar 2025 03:30 )    Color: x / Appearance: x / SG: x / pH: x  Gluc: 143 mg/dL / Ketone: x  / Bili: x / Urobili: x   Blood: x / Protein: x / Nitrite: x   Leuk Esterase: x / RBC: x / WBC x   Sq Epi: x / Non Sq Epi: x / Bacteria: x        A: 64F POD 0 s/p lumbar fusion with ortho spine, exposure performed by vascular.  She is currently requiring a small dose of moisés but is otherwise stable and feels well.     P:  - Multimodal pain control  - Peripheral vascular checks  - Diet per primary  - Rest of care per SICU, appreciate their management.

## 2025-03-25 NOTE — PROGRESS NOTE ADULT - SUBJECTIVE AND OBJECTIVE BOX
Interval events and physical exam included with assessment and plan below    Objective   T(C): 36.9 (03-25-25 @ 11:18), Max: 37.4 (03-25-25 @ 07:29)  HR: 88 (03-25-25 @ 14:00) (61 - 93)  BP: 109/66 (03-25-25 @ 00:30) (77/65 - 125/74)  RR: 18 (03-25-25 @ 14:00) (10 - 25)  SpO2: 97% (03-25-25 @ 14:00) (92% - 100%)      03-24-25 @ 07:01  -  03-25-25 @ 07:00  --------------------------------------------------------  IN: 2667.5 mL / OUT: 925 mL / NET: 1742.5 mL    03-25-25 @ 07:01  -  03-25-25 @ 14:42  --------------------------------------------------------  IN: 429.8 mL / OUT: 680 mL / NET: -250.2 mL        HOME MEDS:  arthritis tylenol 2 3x per daily:   Boniva 150 mg oral tablet: 1 tab(s) orally every 4 weeks  citracal slow release 1200 mg once:   cyclobenzaprine 10 mg oral tablet: 1 tab(s) orally once a day  fish oil once daily:   pregabalin 75 mg oral capsule: 1 cap(s) orally once a day  valsartan-hydrochlorothiazide 80 mg-12.5 mg oral tablet: 1 tab(s) orally once a day (at bedtime)  vitamin C once daily 1000 mg daily:       CURRENT MEDS:   acetaminophen     Tablet .. 975 milliGRAM(s) Oral every 8 hours  aluminum hydroxide/magnesium hydroxide/simethicone Suspension 30 milliLiter(s) Oral every 12 hours PRN  chlorhexidine 2% Cloths 1 Application(s) Topical daily  chlorhexidine 2% Cloths 1 Application(s) Topical daily  dextrose 5% + lactated ringers. 1000 milliLiter(s) IV Continuous <Continuous>  enoxaparin Injectable 40 milliGRAM(s) SubCutaneous once  HYDROmorphone PCA (1 mG/mL) 30 milliLiter(s) PCA Continuous PCA Continuous  influenza  Vaccine (HIGH DOSE) 0.5 milliLiter(s) IntraMuscular once  magnesium hydroxide Suspension 30 milliLiter(s) Oral every 12 hours PRN  melatonin 3 milliGRAM(s) Oral at bedtime PRN  methocarbamol 500 milliGRAM(s) Oral every 8 hours  pantoprazole    Tablet 40 milliGRAM(s) Oral before breakfast  phenylephrine    Infusion 0.517 MICROgram(s)/kG/Min IV Continuous <Continuous>  pregabalin 75 milliGRAM(s) Oral daily  senna 2 Tablet(s) Oral at bedtime

## 2025-03-25 NOTE — PROGRESS NOTE ADULT - ASSESSMENT
69-year-old female with a PMH of HTN, osteoporosis, and scoliosis, and admitted for surgical correction of progressive adult spinal deformity. Underwent anterior lumbar interbody fusion L4-L5, L5-S1, and lateral fusion L2-L3, L3-L4 on 3/24/25. Planned for posterior instrumented fusion T9-pelvis (possible T4-pelvis) on 3/26/2 with postoperative hypotension and hypovolemic shock post-op.    3/25: Received 1 unit PRBC and 2L IVF post-op for hypotension. Started on norepinephrine briefly, now off.    Neuro:  A&O x3. No focal deficits.  #Post-op pain: PCA in place. Multimodal regimen with Tylenol, Dilaudid, pregabalin, and Robaxin.  #Q4 neuro checks: Continue routine monitoring.    Pulm:  Breathing comfortably on room air.  #Pulmonary toilet: Encourage incentive spirometry. Monitor for signs of atelectasis or hypoventilation.    Cardiac:  NSR. Off norepinephrine.  #Hypovolemic shock: Resolved with fluid resuscitation and 1 unit PRBC. Continue monitoring MAP >65.  #Hypertension: Resume home valsartan-HCTZ once euvolemic and stable.    GI:  Abdomen soft, non-tender. Tolerating regular diet.  #Bowel regimen: Initiate senna and miralax. Monitor for return of bowel function.    :  Riley catheter in place.  #Monitor UOP closely. Riley to remain with postoperative lability, pain, and immobility     Endo:  #s/p laminectomy: c/w dexamethasone. Monitor for hyperglycemia.  #Osteoporosis: Continue home Boniva when able    Heme/DVT Prophylaxis:  #DVT prophylaxis: SCDs and 1 dose Lovenox given. Hold Lovenox on day of second surgery.  #Acute blood loss anemia: Received 1 unit PRBC postoperatively. Monitor H/H.    ID:  #Prophylaxis: Cefazolin perioperatively. No signs of infection.    Skin:  #Pressure ulcer prevention: Reposition frequently. Skin intact.    Disposition:  SICU

## 2025-03-26 ENCOUNTER — APPOINTMENT (OUTPATIENT)
Dept: ORTHOPEDIC SURGERY | Facility: HOSPITAL | Age: 65
End: 2025-03-26

## 2025-03-26 ENCOUNTER — TRANSCRIPTION ENCOUNTER (OUTPATIENT)
Age: 65
End: 2025-03-26

## 2025-03-26 LAB
ANION GAP SERPL CALC-SCNC: 8 MMOL/L — SIGNIFICANT CHANGE UP (ref 5–17)
ANION GAP SERPL CALC-SCNC: 8 MMOL/L — SIGNIFICANT CHANGE UP (ref 5–17)
APTT BLD: 18.3 SEC — LOW (ref 24.5–35.6)
BUN SERPL-MCNC: 12.6 MG/DL — SIGNIFICANT CHANGE UP (ref 8–20)
BUN SERPL-MCNC: 12.9 MG/DL — SIGNIFICANT CHANGE UP (ref 8–20)
CALCIUM SERPL-MCNC: 6.7 MG/DL — LOW (ref 8.4–10.5)
CALCIUM SERPL-MCNC: 8 MG/DL — LOW (ref 8.4–10.5)
CHLORIDE SERPL-SCNC: 105 MMOL/L — SIGNIFICANT CHANGE UP (ref 96–108)
CHLORIDE SERPL-SCNC: 108 MMOL/L — SIGNIFICANT CHANGE UP (ref 96–108)
CO2 SERPL-SCNC: 22 MMOL/L — SIGNIFICANT CHANGE UP (ref 22–29)
CO2 SERPL-SCNC: 25 MMOL/L — SIGNIFICANT CHANGE UP (ref 22–29)
CREAT SERPL-MCNC: 0.39 MG/DL — LOW (ref 0.5–1.3)
CREAT SERPL-MCNC: 0.42 MG/DL — LOW (ref 0.5–1.3)
EGFR: 108 ML/MIN/1.73M2 — SIGNIFICANT CHANGE UP
EGFR: 108 ML/MIN/1.73M2 — SIGNIFICANT CHANGE UP
EGFR: 110 ML/MIN/1.73M2 — SIGNIFICANT CHANGE UP
EGFR: 110 ML/MIN/1.73M2 — SIGNIFICANT CHANGE UP
FIBRINOGEN PPP-MCNC: 340 MG/DL — SIGNIFICANT CHANGE UP (ref 200–450)
GAS PNL BLDA: SIGNIFICANT CHANGE UP
GLUCOSE SERPL-MCNC: 125 MG/DL — HIGH (ref 70–99)
GLUCOSE SERPL-MCNC: 184 MG/DL — HIGH (ref 70–99)
HCT VFR BLD CALC: 27.4 % — LOW (ref 34.5–45)
HCT VFR BLD CALC: 27.7 % — LOW (ref 34.5–45)
HGB BLD-MCNC: 9.5 G/DL — LOW (ref 11.5–15.5)
HGB BLD-MCNC: 9.6 G/DL — LOW (ref 11.5–15.5)
INR BLD: 0.96 RATIO — SIGNIFICANT CHANGE UP (ref 0.85–1.16)
MAGNESIUM SERPL-MCNC: 1.9 MG/DL — SIGNIFICANT CHANGE UP (ref 1.6–2.6)
MAGNESIUM SERPL-MCNC: 2 MG/DL — SIGNIFICANT CHANGE UP (ref 1.8–2.6)
MCHC RBC-ENTMCNC: 30.6 PG — SIGNIFICANT CHANGE UP (ref 27–34)
MCHC RBC-ENTMCNC: 31 PG — SIGNIFICANT CHANGE UP (ref 27–34)
MCHC RBC-ENTMCNC: 34.7 G/DL — SIGNIFICANT CHANGE UP (ref 32–36)
MCHC RBC-ENTMCNC: 34.7 G/DL — SIGNIFICANT CHANGE UP (ref 32–36)
MCV RBC AUTO: 88.4 FL — SIGNIFICANT CHANGE UP (ref 80–100)
MCV RBC AUTO: 89.4 FL — SIGNIFICANT CHANGE UP (ref 80–100)
NRBC # BLD AUTO: 0 K/UL — SIGNIFICANT CHANGE UP (ref 0–0)
NRBC # BLD AUTO: 0 K/UL — SIGNIFICANT CHANGE UP (ref 0–0)
NRBC # FLD: 0 K/UL — SIGNIFICANT CHANGE UP (ref 0–0)
NRBC # FLD: 0 K/UL — SIGNIFICANT CHANGE UP (ref 0–0)
NRBC BLD AUTO-RTO: 0 /100 WBCS — SIGNIFICANT CHANGE UP (ref 0–0)
NRBC BLD AUTO-RTO: 0 /100 WBCS — SIGNIFICANT CHANGE UP (ref 0–0)
PHOSPHATE SERPL-MCNC: 1.9 MG/DL — LOW (ref 2.4–4.7)
PHOSPHATE SERPL-MCNC: 2.4 MG/DL — SIGNIFICANT CHANGE UP (ref 2.4–4.7)
PLATELET # BLD AUTO: 169 K/UL — SIGNIFICANT CHANGE UP (ref 150–400)
PLATELET # BLD AUTO: 245 K/UL — SIGNIFICANT CHANGE UP (ref 150–400)
PMV BLD: 8.9 FL — SIGNIFICANT CHANGE UP (ref 7–13)
PMV BLD: 8.9 FL — SIGNIFICANT CHANGE UP (ref 7–13)
POTASSIUM SERPL-MCNC: 3.7 MMOL/L — SIGNIFICANT CHANGE UP (ref 3.5–5.3)
POTASSIUM SERPL-MCNC: 4.4 MMOL/L — SIGNIFICANT CHANGE UP (ref 3.5–5.3)
POTASSIUM SERPL-SCNC: 3.7 MMOL/L — SIGNIFICANT CHANGE UP (ref 3.5–5.3)
POTASSIUM SERPL-SCNC: 4.4 MMOL/L — SIGNIFICANT CHANGE UP (ref 3.5–5.3)
PROTHROM AB SERPL-ACNC: 10.9 SEC — SIGNIFICANT CHANGE UP (ref 9.9–13.4)
RBC # BLD: 3.1 M/UL — LOW (ref 3.8–5.2)
RBC # BLD: 3.1 M/UL — LOW (ref 3.8–5.2)
RBC # FLD: 14.3 % — SIGNIFICANT CHANGE UP (ref 10.3–14.5)
RBC # FLD: 14.6 % — HIGH (ref 10.3–14.5)
SODIUM SERPL-SCNC: 138 MMOL/L — SIGNIFICANT CHANGE UP (ref 135–145)
SODIUM SERPL-SCNC: 138 MMOL/L — SIGNIFICANT CHANGE UP (ref 135–145)
WBC # BLD: 9.63 K/UL — SIGNIFICANT CHANGE UP (ref 3.8–10.5)
WBC # BLD: 9.84 K/UL — SIGNIFICANT CHANGE UP (ref 3.8–10.5)
WBC # FLD AUTO: 9.63 K/UL — SIGNIFICANT CHANGE UP (ref 3.8–10.5)
WBC # FLD AUTO: 9.84 K/UL — SIGNIFICANT CHANGE UP (ref 3.8–10.5)

## 2025-03-26 PROCEDURE — 27280 ARTHR SI JT OPN B1GRF INSTRM: CPT | Mod: 80,50

## 2025-03-26 PROCEDURE — 22610 ARTHRD PST TQ 1NTRSPC THRC: CPT

## 2025-03-26 PROCEDURE — 63012 REMOVE LAMINA/FACETS LUMBAR: CPT

## 2025-03-26 PROCEDURE — 27280 ARTHR SI JT OPN B1GRF INSTRM: CPT | Mod: 50

## 2025-03-26 PROCEDURE — 22216 INCIS ADDL SPINE SEGMENT: CPT

## 2025-03-26 PROCEDURE — 22614 ARTHRD PST TQ 1NTRSPC EA ADD: CPT | Mod: 80

## 2025-03-26 PROCEDURE — 22212 INCIS 1 VERTEBRAL SEG THORAC: CPT

## 2025-03-26 PROCEDURE — 22216 INCIS ADDL SPINE SEGMENT: CPT | Mod: 80

## 2025-03-26 PROCEDURE — 22610 ARTHRD PST TQ 1NTRSPC THRC: CPT | Mod: 80

## 2025-03-26 PROCEDURE — 99291 CRITICAL CARE FIRST HOUR: CPT

## 2025-03-26 PROCEDURE — 22212 INCIS 1 VERTEBRAL SEG THORAC: CPT | Mod: 80

## 2025-03-26 PROCEDURE — 22844 INSERT SPINE FIXATION DEVICE: CPT | Mod: 80

## 2025-03-26 PROCEDURE — 63012 REMOVE LAMINA/FACETS LUMBAR: CPT | Mod: 80

## 2025-03-26 PROCEDURE — 22844 INSERT SPINE FIXATION DEVICE: CPT

## 2025-03-26 PROCEDURE — 22614 ARTHRD PST TQ 1NTRSPC EA ADD: CPT

## 2025-03-26 DEVICE — GRAFT FIBERGRAFT PUTTY MED 4CC: Type: IMPLANTABLE DEVICE | Status: FUNCTIONAL

## 2025-03-26 DEVICE — DURASEAL SEALANT 5ML: Type: IMPLANTABLE DEVICE | Status: FUNCTIONAL

## 2025-03-26 DEVICE — AGENT HEMOSTAT COLLAGEN AVITENE ULTRA 50SQCM: Type: IMPLANTABLE DEVICE | Status: FUNCTIONAL

## 2025-03-26 DEVICE — BONE FILLER BIOCOMPOSITE STIMULAN RAPID CURE 10CC: Type: IMPLANTABLE DEVICE | Status: FUNCTIONAL

## 2025-03-26 DEVICE — IMPLANTABLE DEVICE: Type: IMPLANTABLE DEVICE | Status: FUNCTIONAL

## 2025-03-26 DEVICE — SCREW EXP 5.5 VERSE 6X40MM: Type: IMPLANTABLE DEVICE | Status: FUNCTIONAL

## 2025-03-26 DEVICE — SCREW ST MIS SNGL INNER VIPER: Type: IMPLANTABLE DEVICE | Status: FUNCTIONAL

## 2025-03-26 DEVICE — SURGIFLO MATRIX WITH THROMBIN KIT: Type: IMPLANTABLE DEVICE | Status: FUNCTIONAL

## 2025-03-26 DEVICE — CONN O/O SD TOP NTCH 5.5X5.5 T: Type: IMPLANTABLE DEVICE | Status: FUNCTIONAL

## 2025-03-26 DEVICE — ROD 480MM: Type: IMPLANTABLE DEVICE | Status: FUNCTIONAL

## 2025-03-26 DEVICE — KIT BONE CEMENT CONFIDENCE: Type: IMPLANTABLE DEVICE | Status: FUNCTIONAL

## 2025-03-26 DEVICE — GRAFT DURAL MATRX 1 X 3IN DURGN: Type: IMPLANTABLE DEVICE | Status: FUNCTIONAL

## 2025-03-26 DEVICE — AGENT HEMOSTAT COLLAGEN AVITENE ULTRA 8X12.5CM: Type: IMPLANTABLE DEVICE | Status: FUNCTIONAL

## 2025-03-26 DEVICE — SURGICEL FIBRILLAR 4 X 4": Type: IMPLANTABLE DEVICE | Status: FUNCTIONAL

## 2025-03-26 RX ORDER — PREGABALIN 75 MG/1
75 CAPSULE ORAL DAILY
Refills: 0 | Status: DISCONTINUED | OUTPATIENT
Start: 2025-03-26 | End: 2025-03-31

## 2025-03-26 RX ORDER — ONDANSETRON HCL/PF 4 MG/2 ML
4 VIAL (ML) INJECTION EVERY 6 HOURS
Refills: 0 | Status: DISCONTINUED | OUTPATIENT
Start: 2025-03-26 | End: 2025-03-26

## 2025-03-26 RX ORDER — HYDROMORPHONE/SOD CHLOR,ISO/PF 2 MG/10 ML
30 SYRINGE (ML) INJECTION
Refills: 0 | Status: DISCONTINUED | OUTPATIENT
Start: 2025-03-26 | End: 2025-03-27

## 2025-03-26 RX ORDER — MELATONIN 5 MG
3 TABLET ORAL AT BEDTIME
Refills: 0 | Status: DISCONTINUED | OUTPATIENT
Start: 2025-03-26 | End: 2025-03-31

## 2025-03-26 RX ORDER — ACETAMINOPHEN 500 MG/5ML
975 LIQUID (ML) ORAL EVERY 8 HOURS
Refills: 0 | Status: DISCONTINUED | OUTPATIENT
Start: 2025-03-26 | End: 2025-03-31

## 2025-03-26 RX ORDER — MAGNESIUM SULFATE 500 MG/ML
1 SYRINGE (ML) INJECTION ONCE
Refills: 0 | Status: COMPLETED | OUTPATIENT
Start: 2025-03-26 | End: 2025-03-26

## 2025-03-26 RX ORDER — SODIUM CHLORIDE 9 G/1000ML
500 INJECTION, SOLUTION INTRAVENOUS ONCE
Refills: 0 | Status: COMPLETED | OUTPATIENT
Start: 2025-03-26 | End: 2025-03-26

## 2025-03-26 RX ORDER — CALCIUM GLUCONATE 20 MG/ML
2 INJECTION, SOLUTION INTRAVENOUS ONCE
Refills: 0 | Status: COMPLETED | OUTPATIENT
Start: 2025-03-26 | End: 2025-03-26

## 2025-03-26 RX ORDER — OXYCODONE HYDROCHLORIDE 30 MG/1
10 TABLET ORAL
Refills: 0 | Status: DISCONTINUED | OUTPATIENT
Start: 2025-03-26 | End: 2025-03-26

## 2025-03-26 RX ORDER — OXYCODONE HYDROCHLORIDE 30 MG/1
5 TABLET ORAL
Refills: 0 | Status: DISCONTINUED | OUTPATIENT
Start: 2025-03-26 | End: 2025-03-26

## 2025-03-26 RX ORDER — KETOROLAC TROMETHAMINE 30 MG/ML
30 INJECTION, SOLUTION INTRAMUSCULAR; INTRAVENOUS EVERY 6 HOURS
Refills: 0 | Status: DISCONTINUED | OUTPATIENT
Start: 2025-03-26 | End: 2025-03-26

## 2025-03-26 RX ORDER — SODIUM CHLORIDE 9 G/1000ML
1000 INJECTION, SOLUTION INTRAVENOUS ONCE
Refills: 0 | Status: COMPLETED | OUTPATIENT
Start: 2025-03-26 | End: 2025-03-26

## 2025-03-26 RX ORDER — SODIUM CHLORIDE 9 G/1000ML
1000 INJECTION, SOLUTION INTRAVENOUS
Refills: 0 | Status: DISCONTINUED | OUTPATIENT
Start: 2025-03-26 | End: 2025-03-27

## 2025-03-26 RX ORDER — ONDANSETRON HCL/PF 4 MG/2 ML
4 VIAL (ML) INJECTION EVERY 6 HOURS
Refills: 0 | Status: DISCONTINUED | OUTPATIENT
Start: 2025-03-26 | End: 2025-03-31

## 2025-03-26 RX ORDER — HYDROMORPHONE/SOD CHLOR,ISO/PF 2 MG/10 ML
0.5 SYRINGE (ML) INJECTION EVERY 4 HOURS
Refills: 0 | Status: DISCONTINUED | OUTPATIENT
Start: 2025-03-26 | End: 2025-03-27

## 2025-03-26 RX ORDER — SENNA 187 MG
2 TABLET ORAL AT BEDTIME
Refills: 0 | Status: DISCONTINUED | OUTPATIENT
Start: 2025-03-26 | End: 2025-03-31

## 2025-03-26 RX ORDER — POTASSIUM PHOSPHATE, MONOBASIC POTASSIUM PHOSPHATE, DIBASIC INJECTION, 236; 224 MG/ML; MG/ML
30 SOLUTION, CONCENTRATE INTRAVENOUS ONCE
Refills: 0 | Status: COMPLETED | OUTPATIENT
Start: 2025-03-26 | End: 2025-03-26

## 2025-03-26 RX ORDER — ENOXAPARIN SODIUM 100 MG/ML
40 INJECTION SUBCUTANEOUS EVERY 24 HOURS
Refills: 0 | Status: DISCONTINUED | OUTPATIENT
Start: 2025-03-27 | End: 2025-03-27

## 2025-03-26 RX ORDER — CEFAZOLIN SODIUM IN 0.9 % NACL 3 G/100 ML
2000 INTRAVENOUS SOLUTION, PIGGYBACK (ML) INTRAVENOUS
Refills: 0 | Status: COMPLETED | OUTPATIENT
Start: 2025-03-26 | End: 2025-03-27

## 2025-03-26 RX ORDER — MAGNESIUM, ALUMINUM HYDROXIDE 200-200 MG
30 TABLET,CHEWABLE ORAL EVERY 12 HOURS
Refills: 0 | Status: DISCONTINUED | OUTPATIENT
Start: 2025-03-26 | End: 2025-03-31

## 2025-03-26 RX ORDER — MAGNESIUM HYDROXIDE 400 MG/5ML
30 SUSPENSION ORAL EVERY 12 HOURS
Refills: 0 | Status: DISCONTINUED | OUTPATIENT
Start: 2025-03-26 | End: 2025-03-31

## 2025-03-26 RX ORDER — METHOCARBAMOL 500 MG/1
500 TABLET, FILM COATED ORAL EVERY 8 HOURS
Refills: 0 | Status: DISCONTINUED | OUTPATIENT
Start: 2025-03-26 | End: 2025-03-31

## 2025-03-26 RX ADMIN — Medication 30 MILLILITER(S): at 14:49

## 2025-03-26 RX ADMIN — SODIUM CHLORIDE 500 MILLILITER(S): 9 INJECTION, SOLUTION INTRAVENOUS at 02:56

## 2025-03-26 RX ADMIN — PREGABALIN 75 MILLIGRAM(S): 75 CAPSULE ORAL at 22:11

## 2025-03-26 RX ADMIN — Medication 30 MILLILITER(S): at 07:05

## 2025-03-26 RX ADMIN — SODIUM CHLORIDE 100 MILLILITER(S): 9 INJECTION, SOLUTION INTRAVENOUS at 02:56

## 2025-03-26 RX ADMIN — Medication 975 MILLIGRAM(S): at 22:11

## 2025-03-26 RX ADMIN — Medication 975 MILLIGRAM(S): at 22:53

## 2025-03-26 RX ADMIN — Medication 1 APPLICATION(S): at 22:15

## 2025-03-26 RX ADMIN — CALCIUM GLUCONATE 200 GRAM(S): 20 INJECTION, SOLUTION INTRAVENOUS at 15:22

## 2025-03-26 RX ADMIN — Medication 100 GRAM(S): at 15:56

## 2025-03-26 RX ADMIN — SODIUM CHLORIDE 1000 MILLILITER(S): 9 INJECTION, SOLUTION INTRAVENOUS at 16:30

## 2025-03-26 RX ADMIN — SODIUM CHLORIDE 75 MILLILITER(S): 9 INJECTION, SOLUTION INTRAVENOUS at 14:45

## 2025-03-26 RX ADMIN — SODIUM CHLORIDE 1000 MILLILITER(S): 9 INJECTION, SOLUTION INTRAVENOUS at 14:43

## 2025-03-26 RX ADMIN — METHOCARBAMOL 500 MILLIGRAM(S): 500 TABLET, FILM COATED ORAL at 22:11

## 2025-03-26 RX ADMIN — Medication 30 MILLILITER(S): at 19:23

## 2025-03-26 RX ADMIN — Medication 2000 MILLIGRAM(S): at 21:05

## 2025-03-26 RX ADMIN — Medication 2 TABLET(S): at 22:11

## 2025-03-26 NOTE — BRIEF OPERATIVE NOTE - FIRST ASSIST PARTICIPATION DETAILS - (COMPONENTS OF THE PROCEDURE THAT ASSISTANT PARTICIPATED IN)
I acted within this role throughout the entirety of the procedure performed by the primary surgeon

## 2025-03-26 NOTE — BRIEF OPERATIVE NOTE - COMMENTS
DePuy posterior instrumentation, neuromonitoring stable preop IntraOp and postop comparisons with no sustained deficiencies
Wound class I
Bear hugger for normothermia DePuy interbody biomechanical graft with arthrodesis at 4 levels [id number] and 5 1,  neuromonitoring stable post position preop IntraOp and postop comparison

## 2025-03-26 NOTE — BRIEF OPERATIVE NOTE - FIRST ASSIST NAME
Godwin Srinivasan (Attending)
Neftali Ahn (Resident)
Godwin Srinivasan (Attending)
Godwin Srinivasan (Attending)
Kim Abbott (Physician Assistant)
Kim Abbott (Physician Assistant)

## 2025-03-26 NOTE — BRIEF OPERATIVE NOTE - OPERATION/FINDINGS
I assisted with positioning patient prone on a Daryn Table. I ensured proper imaging was displayed for surgery.   I assisted with deep closure and performed superficial and skin closure, placed drain stitch.  Dermabond, steri strips and mepelex placed over incision, xeroform and 2x2 over drain.  I led the team in positioning patient back onto hospital bed from Daryn table and ensured patient was transported to SICU.

## 2025-03-26 NOTE — PHYSICAL THERAPY INITIAL EVALUATION ADULT - PERTINENT HX OF CURRENT PROBLEM, REHAB EVAL
64 year old woman w/hx of HTN, duodenal spasm, osteoporosis, scoliosis who was admitted 3/24/25 w/progressive lumbar spondylosis w/progressive deformity who underwent an elective L4-L5, L5-S1 Lateral fusion and L2-L3, L3-L4 with posterior instrumented fusion T9 pelvis.  3/26/25 patient underwent a lumbar fusion from thoracic spine through pelvis.

## 2025-03-26 NOTE — PROGRESS NOTE ADULT - SUBJECTIVE AND OBJECTIVE BOX
KIM [id number]    64yFemale  Scoliosis    FH: heart disease (Father, Mother)    FH: lung cancer (Father)    FH: breast cancer (Mother)    Handoff    H/O osteoporosis    HTN (hypertension)    Scoliosis    Lumbar spondylosis    Chronic radicular low back pain    Rotoscoliosis    Chronic radicular low back pain    Rotoscoliosis    Fusion, spine, thoracolumbar, posterior approach    Sagittal plane imbalance    Scoliosis, unspecified    HTN (hypertension)    Need for prophylactic measure    RUBY (obstructive sleep apnea)    At risk for sleep apnea    Surgical exposure for anterior lumbar interbody fusion (ALIF)    Fusion, spine, lumbar, 3 or more levels, using anterior interbody technique    Fusion, spine, thoracolumbar, posterior approach    Fusion of thoracic spine through pelvis with osteotomy if indicated    H/O shoulder surgery    H/O lumpectomy    SCOLIOSIS, UNSPECIFIED    ALIF, 2 levels, using cage    Fusion, spine, lumbar, TLIF, 1-2 levels    Lumbar stenosis with neurogenic claudication    Lumbar spondylosis    Multilevel scoliosis        OBJECTIVE:   T(C): 36.6 (03-26-25 @ 04:06), Max: 37.6 (03-25-25 @ 21:19)  HR: 78 (03-26-25 @ 07:20) (74 - 101)  BP: 140/87 (03-26-25 @ 07:00) (86/58 - 140/87)  RR: 21 (03-26-25 @ 07:20) (14 - 25)  SpO2: 97% (03-26-25 @ 07:20) (94% - 100%)                                                  Home Medications:  arthritis tylenol 2 3x per daily:  (24 Mar 2025 06:32)  Boniva 150 mg oral tablet: 1 tab(s) orally every 4 weeks (24 Mar 2025 06:32)  citracal slow release 1200 mg once:  (24 Mar 2025 06:32)  cyclobenzaprine 10 mg oral tablet: 1 tab(s) orally once a day (24 Mar 2025 06:32)  fish oil once daily:  (24 Mar 2025 06:32)  pregabalin 75 mg oral capsule: 1 cap(s) orally once a day (24 Mar 2025 06:32)  valsartan-hydrochlorothiazide 80 mg-12.5 mg oral tablet: 1 tab(s) orally once a day (at bedtime) (24 Mar 2025 06:32)  vitamin C once daily 1000 mg daily:  (24 Mar 2025 06:32)      STATUS POST: Posterior instrumented and non instrumented fusion T9-pelvis POD 0, anterior lumbar fusion L4-L5, L5-S1 then lateral fusion L2-L3, L3-L4 for back pain POD 2  SUBJECTIVE: Patient seen and examined doing well    Back Pain controlled, lower extremity pain resolving    OBJECTIVE:   T(C): 36.1 (03-24-25 @ 17:10), Max: 36.7 (03-24-25 @ 06:03)  HR: 86 (03-24-25 @ 17:25) (72 - 86)  BP: 125/74 (03-24-25 @ 17:20) (77/65 - 142/91)  RR: 15 (03-24-25 @ 17:25) (12 - 16)  SpO2: 100% (03-24-25 @ 17:25) (100% - 100%)   Constitutional: Pleasant in no acute distress  Psych:A&Ox3  Abdominal: soft and supple non distended  Lymphatics: no pretibial pitting edema  Spine:          Dressing:  clean/dry/intact anterior lumbar/abdomen and left lateral torso and thoracolumbar.  thoracolumbar drain present.                                         Sensation:            Upper extremity          grossly intact manually          Lower extremity           grossly intact manually             torso    grossly intact manually                            Motor:          Lower extremity              grossly intact manually 5/5 hip flexor, knee extensor, dorsi/plantar flexion and ankle eversion bilaterally                                                        [x] warm well perfused; capillary refill <3 seconds                               A/P : 64yFemale   S/P Thoracic to pelvic fusion as above POD 0, anterior then lateral lumbar fusion  POD#2, staged procedure    - CBC, BMP stat post op and will return to SICU overnight for observation due to length of surgery and recent acute post surgical blood loss anemia.   -    Pain control- multimodal.  Avoid NSAIDS since they can deter fusion.     -    DVT ppx: [ x]SCDs[ x] Pharmacolgic lovenox 40 mg x 28 days post op.  Can consider switch from lovenox to xarelto post op day 7 if patient prefers not to inject herself at home, discuss with Dr Cao prior to discharge.   -    Periop abx:  Ancef [x ]   -    Check AM labs    -   change all 3 dressings  as needed, and prior to discharge home.    - Drain care for thoracolumbar drain.  Can pull drain when equal to or less than 10 cc/hr over 12 hours.  D/W Dr Cao prior to drain pull.   -  Resume home meds as appropriate  -PT/OT WBAT, balance and gait  - LSO with OOB not ordered/not mandatory.  Ortho/PT team can reevaluate possible benefit for additional external support daily, in post op period.   -medical follow up- Hospitalist Service to be consulted when patient steps down from SICU  - HTN- DASH diet.  Can restart valsartan post op day 2 with parameters.   - probable home 3-5 days post op when pain is controlled and ADL are accomplished well     KIM [id number]    64yFemale  Scoliosis    FH: heart disease (Father, Mother)    FH: lung cancer (Father)    FH: breast cancer (Mother)    Handoff    H/O osteoporosis    HTN (hypertension)    Scoliosis    Lumbar spondylosis    Chronic radicular low back pain    Rotoscoliosis    Chronic radicular low back pain    Rotoscoliosis    Fusion, spine, thoracolumbar, posterior approach    Sagittal plane imbalance    Scoliosis, unspecified    HTN (hypertension)    Need for prophylactic measure    RUBY (obstructive sleep apnea)    At risk for sleep apnea    Surgical exposure for anterior lumbar interbody fusion (ALIF)    Fusion, spine, lumbar, 3 or more levels, using anterior interbody technique    Fusion, spine, thoracolumbar, posterior approach    Fusion of thoracic spine through pelvis with osteotomy if indicated    H/O shoulder surgery    H/O lumpectomy    SCOLIOSIS, UNSPECIFIED    ALIF, 2 levels, using cage    Fusion, spine, lumbar, TLIF, 1-2 levels    Lumbar stenosis with neurogenic claudication    Lumbar spondylosis    Multilevel scoliosis        OBJECTIVE:   T(C): 36.6 (03-26-25 @ 04:06), Max: 37.6 (03-25-25 @ 21:19)  HR: 78 (03-26-25 @ 07:20) (74 - 101)  BP: 140/87 (03-26-25 @ 07:00) (86/58 - 140/87)  RR: 21 (03-26-25 @ 07:20) (14 - 25)  SpO2: 97% (03-26-25 @ 07:20) (94% - 100%)                                                  Home Medications:  arthritis tylenol 2 3x per daily:  (24 Mar 2025 06:32)  Boniva 150 mg oral tablet: 1 tab(s) orally every 4 weeks (24 Mar 2025 06:32)  citracal slow release 1200 mg once:  (24 Mar 2025 06:32)  cyclobenzaprine 10 mg oral tablet: 1 tab(s) orally once a day (24 Mar 2025 06:32)  fish oil once daily:  (24 Mar 2025 06:32)  pregabalin 75 mg oral capsule: 1 cap(s) orally once a day (24 Mar 2025 06:32)  valsartan-hydrochlorothiazide 80 mg-12.5 mg oral tablet: 1 tab(s) orally once a day (at bedtime) (24 Mar 2025 06:32)  vitamin C once daily 1000 mg daily:  (24 Mar 2025 06:32)      STATUS POST: Posterior instrumented and non instrumented fusion T6-pelvis POD 0, anterior lumbar fusion L4-L5, L5-S1 then lateral fusion L2-L3, L3-L4 for back pain POD 2  SUBJECTIVE: Patient seen and examined doing well    Back Pain controlled, lower extremity pain resolving    OBJECTIVE:   T(C): 36.1 (03-24-25 @ 17:10), Max: 36.7 (03-24-25 @ 06:03)  HR: 86 (03-24-25 @ 17:25) (72 - 86)  BP: 125/74 (03-24-25 @ 17:20) (77/65 - 142/91)  RR: 15 (03-24-25 @ 17:25) (12 - 16)  SpO2: 100% (03-24-25 @ 17:25) (100% - 100%)   Constitutional: Pleasant in no acute distress  Psych:A&Ox3  Abdominal: soft and supple non distended  Lymphatics: no pretibial pitting edema  Spine:          Dressing:  clean/dry/intact anterior lumbar/abdomen and left lateral torso and thoracolumbar.  thoracolumbar drain present.                                         Sensation:            Upper extremity          grossly intact manually          Lower extremity           grossly intact manually             torso    grossly intact manually                            Motor:          Lower extremity              grossly intact manually 5/5 hip flexor, knee extensor, dorsi/plantar flexion and ankle eversion bilaterally                                                        [x] warm well perfused; capillary refill <3 seconds                  A/P : 64yFemale   S/P Thoracic to pelvic fusion as above POD 0, anterior then lateral lumbar fusion  POD#2, staged procedure    - CBC, BMP stat post op and will return to SICU overnight for observation due to length of surgery and recent acute post surgical blood loss anemia.   -    Pain control- multimodal.  Avoid NSAIDS since they can deter fusion.     -    DVT ppx: [ x]SCDs[ x] Pharmacolgic lovenox 40 mg x 28 days post op.  Can consider switch from lovenox to xarelto post op day 7 if patient prefers not to inject herself at home, discuss with Dr Cao prior to discharge.   -    Periop abx:  Ancef [x ]   -    Check AM labs    -   change all 3 dressings  as needed, and prior to discharge home.    - Drain care for thoracolumbar drain.  Can pull drain when equal to or less than 10 cc/hr over 12 hours.  D/W Dr Cao prior to drain pull.   -  Resume home meds as appropriate  -PT/OT WBAT, balance and gait  - LSO with OOB not ordered/not mandatory.  Ortho/PT team can reevaluate possible benefit for additional external support daily, in post op period.   -medical follow up- Hospitalist Service to be consulted when patient steps down from SICU  - HTN- DASH diet.  Can restart valsartan post op day 2 with parameters.   - probable home 3-5 days post op when pain is controlled and ADL are accomplished well

## 2025-03-26 NOTE — BRIEF OPERATIVE NOTE - NSICDXBRIEFPOSTOP_GEN_ALL_CORE_FT
POST-OP DIAGNOSIS:  Chronic radicular low back pain 24-Mar-2025 11:34:43  Neftali Ahn  Rotoscoliosis 24-Mar-2025 16:39:14  Gopi Cao  
POST-OP DIAGNOSIS:  Chronic radicular low back pain 24-Mar-2025 11:34:43  Neftali Ahn  Rotoscoliosis 24-Mar-2025 16:39:14  Gopi Cao  
POST-OP DIAGNOSIS:  Rotoscoliosis 24-Mar-2025 16:39:14  Gopi Cao  
POST-OP DIAGNOSIS:  Chronic radicular low back pain 24-Mar-2025 11:34:43  Neftali Ahn  Rotoscoliosis 24-Mar-2025 16:39:14  Gopi Cao  
POST-OP DIAGNOSIS:  Chronic radicular low back pain 24-Mar-2025 11:34:43  Neftali Ahn  
POST-OP DIAGNOSIS:  Chronic radicular low back pain 24-Mar-2025 11:34:43  Neftali Ahn  Rotoscoliosis 24-Mar-2025 16:39:14  Gopi Cao

## 2025-03-26 NOTE — BRIEF OPERATIVE NOTE - SECOND ASSIST PARTICIPATION DETAILS - (COMPONENTS OF THE PROCEDURE THAT ASSISTANT PARTICIPATED IN)
I acted within this role throughout the entirety of the procedure performed by the primary surgeon
closure
I assisted with positioning patient prone on a Daryn Table. I ensured proper imaging was displayed for surgery.   I assisted with deep closure and performed superficial and skin closure, placed drain stitch.  Dermabond, steri strips and mepelex placed over incision, xeroform and 2x2 over drain.  I led the team in positioning patient back onto hospital bed from Daryn table and ensured patient was transported to SICU.

## 2025-03-26 NOTE — PROGRESS NOTE ADULT - SUBJECTIVE AND OBJECTIVE BOX
INTERVAL HPI/OVERNIGHT EVENTS:      SUBJECTIVE:    ICU Vital Signs Last 24 Hrs  T(C): 36.6 (26 Mar 2025 04:06), Max: 37.6 (25 Mar 2025 21:19)  T(F): 97.8 (26 Mar 2025 04:06), Max: 99.7 (25 Mar 2025 21:19)  HR: 101 (26 Mar 2025 14:20) (75 - 101)  BP: 140/87 (26 Mar 2025 07:00) (86/58 - 140/87)  BP(mean): 103 (26 Mar 2025 07:00) (58 - 103)  ABP: 91/57 (26 Mar 2025 14:20) (87/44 - 156/84)  ABP(mean): 70 (26 Mar 2025 14:20) (62 - 114)  RR: 20 (26 Mar 2025 14:20) (16 - 25)  SpO2: 94% (26 Mar 2025 14:20) (94% - 99%)    O2 Parameters below as of 26 Mar 2025 14:20  Patient On (Oxygen Delivery Method): nasal cannula  O2 Flow (L/min): 4        I&O's Detail    25 Mar 2025 07:01  -  26 Mar 2025 07:00  --------------------------------------------------------  IN:    dextrose 5% + lactated ringers: 800 mL    Lactated Ringers Bolus: 1500 mL    multiple electrolytes Injection Type 1.: 225 mL    Oral Fluid: 500 mL    Phenylephrine: 4.8 mL  Total IN: 3029.8 mL    OUT:    Indwelling Catheter - Urethral (mL): 2595 mL  Total OUT: 2595 mL    Total NET: 434.8 mL          MEDICATIONS  (STANDING):  acetaminophen     Tablet .. 975 milliGRAM(s) Oral every 8 hours  ceFAZolin  Injectable. 2000 milliGRAM(s) IV Push <User Schedule>  chlorhexidine 2% Cloths 1 Application(s) Topical daily  influenza  Vaccine (HIGH DOSE) 0.5 milliLiter(s) IntraMuscular once  lactated ringers. 1000 milliLiter(s) (75 mL/Hr) IV Continuous <Continuous>  methocarbamol 500 milliGRAM(s) Oral every 8 hours  multiple electrolytes Injection Type 1 Bolus 1000 milliLiter(s) IV Bolus once  pantoprazole    Tablet 40 milliGRAM(s) Oral before breakfast  pregabalin 75 milliGRAM(s) Oral daily  senna 2 Tablet(s) Oral at bedtime    MEDICATIONS  (PRN):  aluminum hydroxide/magnesium hydroxide/simethicone Suspension 30 milliLiter(s) Oral every 12 hours PRN Indigestion  HYDROmorphone  Injectable 0.5 milliGRAM(s) IV Push every 4 hours PRN SEVERE BREAKTHROUGH PAIN NOT CONTROLLED BY CURRENT MEDS  ketorolac   Injectable 30 milliGRAM(s) IV Push every 6 hours PRN SEVERE BREAKTHROUGH PAIN NOT CONTROLLED BY CURRENT MEDS  magnesium hydroxide Suspension 30 milliLiter(s) Oral every 12 hours PRN Constipation  melatonin 3 milliGRAM(s) Oral at bedtime PRN Insomnia  ondansetron Injectable 4 milliGRAM(s) IV Push every 6 hours PRN Nausea and/or Vomiting    Drug Dosing Weight  Height (cm): 152.4 (25 Mar 2025 01:23)  Weight (kg): 45.4 (25 Mar 2025 01:23)  BMI (kg/m2): 19.5 (25 Mar 2025 01:23)  BSA (m2): 1.39 (25 Mar 2025 01:23)    LABS:  ABG - ( 26 Mar 2025 14:32 )  pH, Arterial: 7.420 pH, Blood: x     /  pCO2: 35    /  pO2: 76    / HCO3: 23    / Base Excess: -1.8  /  SaO2: 98.1              CBC Full  -  ( 26 Mar 2025 14:19 )  WBC Count : 9.63 K/uL  RBC Count : 3.10 M/uL  Hemoglobin : 9.6 g/dL  Hematocrit : 27.7 %  Platelet Count - Automated : 169 K/uL  Mean Cell Volume : 89.4 fl  Mean Cell Hemoglobin : 31.0 pg  Mean Cell Hemoglobin Concentration : 34.7 g/dL  Auto Neutrophil # : x  Auto Lymphocyte # : x  Auto Monocyte # : x  Auto Eosinophil # : x  Auto Basophil # : x  Auto Neutrophil % : x  Auto Lymphocyte % : x  Auto Monocyte % : x  Auto Eosinophil % : x  Auto Basophil % : x    03-26    138  |  105  |  12.6  ----------------------------<  125[H]  3.7   |  25.0  |  0.42[L]    Ca    8.0[L]      26 Mar 2025 03:26  Phos  1.9     03-26  Mg     2.0     03-26    TPro  4.9[L]  /  Alb  3.4  /  TBili  1.0  /  DBili  x   /  AST  41[H]  /  ALT  18  /  AlkPhos  42  03-25    PT/INR - ( 25 Mar 2025 00:00 )   PT: 11.3 sec;   INR: 0.97 ratio         PTT - ( 25 Mar 2025 00:00 )  PTT:23.7 sec  Urinalysis Basic - ( 26 Mar 2025 03:26 )    Color: x / Appearance: x / SG: x / pH: x  Gluc: 125 mg/dL / Ketone: x  / Bili: x / Urobili: x   Blood: x / Protein: x / Nitrite: x   Leuk Esterase: x / RBC: x / WBC x   Sq Epi: x / Non Sq Epi: x / Bacteria: x        Physical Exam:      PATIENT CARE ACCESS DEVICES:  [ ] Peripheral IV  [ ] Central Venous Line	[ ] R	[ ] L	[ ] IJ	[ ] Fem	[ ] SC	   [ ] Arterial Line		[ ] R	[ ] L	[ ] Fem	[ ] Rad	[ ] Ax	   [ ] PICC:					[ ] Mediport  [ ] Urinary Catheter:   [ ] Necessity of urinary, arterial, and venous catheters discussed    ASSESSMENT:      PLAN:             INTERVAL HPI/OVERNIGHT EVENTS:  To OR today  Just returned from OR.    SUBJECTIVE:  Reports some pain post operatively.  ICU Vital Signs Last 24 Hrs  T(C): 36.6 (26 Mar 2025 04:06), Max: 37.6 (25 Mar 2025 21:19)  T(F): 97.8 (26 Mar 2025 04:06), Max: 99.7 (25 Mar 2025 21:19)  HR: 101 (26 Mar 2025 14:20) (75 - 101)  BP: 140/87 (26 Mar 2025 07:00) (86/58 - 140/87)  BP(mean): 103 (26 Mar 2025 07:00) (58 - 103)  ABP: 91/57 (26 Mar 2025 14:20) (87/44 - 156/84)  ABP(mean): 70 (26 Mar 2025 14:20) (62 - 114)  RR: 20 (26 Mar 2025 14:20) (16 - 25)  SpO2: 94% (26 Mar 2025 14:20) (94% - 99%)    O2 Parameters below as of 26 Mar 2025 14:20  Patient On (Oxygen Delivery Method): nasal cannula  O2 Flow (L/min): 4    I&O's Detail    25 Mar 2025 07:01  -  26 Mar 2025 07:00  --------------------------------------------------------  IN:    dextrose 5% + lactated ringers: 800 mL    Lactated Ringers Bolus: 1500 mL    multiple electrolytes Injection Type 1.: 225 mL    Oral Fluid: 500 mL    Phenylephrine: 4.8 mL  Total IN: 3029.8 mL    OUT:    Indwelling Catheter - Urethral (mL): 2595 mL  Total OUT: 2595 mL    Total NET: 434.8 mL      MEDICATIONS  (STANDING):  acetaminophen     Tablet .. 975 milliGRAM(s) Oral every 8 hours  ceFAZolin  Injectable. 2000 milliGRAM(s) IV Push <User Schedule>  chlorhexidine 2% Cloths 1 Application(s) Topical daily  influenza  Vaccine (HIGH DOSE) 0.5 milliLiter(s) IntraMuscular once  lactated ringers. 1000 milliLiter(s) (75 mL/Hr) IV Continuous <Continuous>  methocarbamol 500 milliGRAM(s) Oral every 8 hours  multiple electrolytes Injection Type 1 Bolus 1000 milliLiter(s) IV Bolus once  pantoprazole    Tablet 40 milliGRAM(s) Oral before breakfast  pregabalin 75 milliGRAM(s) Oral daily  senna 2 Tablet(s) Oral at bedtime    MEDICATIONS  (PRN):  aluminum hydroxide/magnesium hydroxide/simethicone Suspension 30 milliLiter(s) Oral every 12 hours PRN Indigestion  HYDROmorphone  Injectable 0.5 milliGRAM(s) IV Push every 4 hours PRN SEVERE BREAKTHROUGH PAIN NOT CONTROLLED BY CURRENT MEDS  ketorolac   Injectable 30 milliGRAM(s) IV Push every 6 hours PRN SEVERE BREAKTHROUGH PAIN NOT CONTROLLED BY CURRENT MEDS  magnesium hydroxide Suspension 30 milliLiter(s) Oral every 12 hours PRN Constipation  melatonin 3 milliGRAM(s) Oral at bedtime PRN Insomnia  ondansetron Injectable 4 milliGRAM(s) IV Push every 6 hours PRN Nausea and/or Vomiting    Drug Dosing Weight  Height (cm): 152.4 (25 Mar 2025 01:23)  Weight (kg): 45.4 (25 Mar 2025 01:23)  BMI (kg/m2): 19.5 (25 Mar 2025 01:23)  BSA (m2): 1.39 (25 Mar 2025 01:23)    LABS:  ABG - ( 26 Mar 2025 14:32 )  pH, Arterial: 7.420 pH, Blood: x     /  pCO2: 35    /  pO2: 76    / HCO3: 23    / Base Excess: -1.8  /  SaO2: 98.1              CBC Full  -  ( 26 Mar 2025 14:19 )  WBC Count : 9.63 K/uL  RBC Count : 3.10 M/uL  Hemoglobin : 9.6 g/dL  Hematocrit : 27.7 %  Platelet Count - Automated : 169 K/uL  Mean Cell Volume : 89.4 fl  Mean Cell Hemoglobin : 31.0 pg  Mean Cell Hemoglobin Concentration : 34.7 g/dL  Auto Neutrophil # : x  Auto Lymphocyte # : x  Auto Monocyte # : x  Auto Eosinophil # : x  Auto Basophil # : x  Auto Neutrophil % : x  Auto Lymphocyte % : x  Auto Monocyte % : x  Auto Eosinophil % : x  Auto Basophil % : x    03-26    138  |  105  |  12.6  ----------------------------<  125[H]  3.7   |  25.0  |  0.42[L]    Ca    8.0[L]      26 Mar 2025 03:26  Phos  1.9     03-26  Mg     2.0     03-26    TPro  4.9[L]  /  Alb  3.4  /  TBili  1.0  /  DBili  x   /  AST  41[H]  /  ALT  18  /  AlkPhos  42  03-25    PT/INR - ( 25 Mar 2025 00:00 )   PT: 11.3 sec;   INR: 0.97 ratio         PTT - ( 25 Mar 2025 00:00 )  PTT:23.7 sec  Urinalysis Basic - ( 26 Mar 2025 03:26 )    Color: x / Appearance: x / SG: x / pH: x  Gluc: 125 mg/dL / Ketone: x  / Bili: x / Urobili: x   Blood: x / Protein: x / Nitrite: x   Leuk Esterase: x / RBC: x / WBC x   Sq Epi: x / Non Sq Epi: x / Bacteria: x    Physical Exam:  General:  Comfortable  NEUROLOGIC:  Awake, alert, conversant, moves all extremities without gross deficits but weak at hips w/flexion (just returned from OR - still sleepy)  HEENT:  NC/AT  HEART:  Regular rate and rhythm  CHEST/LUNG: Clear breath sounds b/l  ABDOMEN: Soft, non tender, non distended  EXTREMITIES:  Warm, no edema  SKIN:  Intact  BACK:  Dressing clean and dry, SHEILA w/thin serous drainage    PATIENT CARE ACCESS DEVICES:  [x ] Peripheral IV  [ ] Central Venous Line	[ ] R	[ ] L	[ ] IJ	[ ] Fem	[ ] SC	   [ x] Arterial Line		[ ] R	[ ] L	[ ] Fem	[ ] Rad	[ ] Ax	   [ ] PICC:					[ ] Mediport  [x ] Urinary Catheter:   [x ] Necessity of urinary, arterial, and venous catheters discussed    ASSESSMENT:  64 year old woman w/hx of HTN, duodenal spasm, osteoporosis, scoliosis who was admitted 3/24/25 w/progressive lumbar spondylosis w/progressive deformity who underwent an elective L4-L5, L5-S1 Lateral fusion and L2-L3, L3-L4 with posterior instrumented fusion T9 pelvis.  3/26/25 patient underwent a lumbar fusion from thoracic spine through pelvis.  PLAN:    NEURO: Pain medication - multi-modal and PCA  CVS:  Currently HD stable  PUL:  Wean NC as tolerated, IS  HEME:  Check post-op H/H, ortho team notes that it is expected for the patient to have ongoing bloody drainage from SHEILA drain, scds, OK for chemical prophylaxis in am 3/27/25, given 1UPRBC in OR  GI:  Diet as tolerated, bowel regimen  : Cr adequate this am, 175cc uop during the case, alegria to remain today - likely d/c in am, repeat Cr pending  ID:  Afebrile, WBC this am normal, perioperative antibiotics x 24hrs per surgery  FLUIDS:  Would give 1L bolus now fresh post op and continue w/ongoing IVF  FEN:  K and phos to be repleted this am - recheck now post op  LINES:  A line and PIV    Attestation Statement:  I have personally and independently provided 55 minutes of non-continuous non-critical care services.  This excludes any time spent on separate procedures or teaching.

## 2025-03-26 NOTE — BRIEF OPERATIVE NOTE - FIRST ASSIST CATEGORY
No qualified resident/fellow available to assist
No Resident Program within this Specialty at this Location

## 2025-03-26 NOTE — BRIEF OPERATIVE NOTE - NSICDXBRIEFPROCEDURE_GEN_ALL_CORE_FT
PROCEDURES:  Surgical exposure for anterior lumbar interbody fusion (ALIF) 24-Mar-2025 11:34:24  Neftali Ahn  Fusion, spine, lumbar, 3 or more levels, using anterior interbody technique 24-Mar-2025 16:38:48  Gopi Cao  
PROCEDURES:  Fusion, spine, lumbar, 3 or more levels, using anterior interbody technique 24-Mar-2025 16:38:48  Gopi Cao  
PROCEDURES:  Fusion of thoracic spine through pelvis with osteotomy if indicated 26-Mar-2025 08:55:05  Kim Abbott  
PROCEDURES:  Fusion, spine, lumbar, 3 or more levels, using anterior interbody technique 24-Mar-2025 16:38:48  Gopi Cao  Fusion of thoracic spine through pelvis with osteotomy if indicated 26-Mar-2025 08:55:05  Kim Abbott  
PROCEDURES:  Surgical exposure for anterior lumbar interbody fusion (ALIF) 24-Mar-2025 11:34:24  Neftali Ahn  
PROCEDURES:  Surgical exposure for anterior lumbar interbody fusion (ALIF) 24-Mar-2025 11:34:24  Neftali Ahn  Fusion, spine, lumbar, 3 or more levels, using anterior interbody technique 24-Mar-2025 16:38:48  Gopi Cao  Fusion of thoracic spine through pelvis with osteotomy if indicated 26-Mar-2025 08:55:05  Kim Abbott

## 2025-03-26 NOTE — PHYSICAL THERAPY INITIAL EVALUATION ADULT - ADDITIONAL COMMENTS
pt reports living in private home with  who is home during the day able to assist. Pt has 2 ANTONINO with handrail and no stairs inside. Independent prior to admission. Owns RW and commode

## 2025-03-26 NOTE — BRIEF OPERATIVE NOTE - NSICDXBRIEFPREOP_GEN_ALL_CORE_FT
PRE-OP DIAGNOSIS:  Chronic radicular low back pain 24-Mar-2025 11:34:34  Neftali Ahn  
PRE-OP DIAGNOSIS:  Rotoscoliosis 24-Mar-2025 16:39:03  Gopi Cao  
PRE-OP DIAGNOSIS:  Chronic radicular low back pain 24-Mar-2025 11:34:34  Neftali Ahn  Rotoscoliosis 24-Mar-2025 16:39:03  Gopi Cao  
98.4
PRE-OP DIAGNOSIS:  Chronic radicular low back pain 24-Mar-2025 11:34:34  Neftali Ahn  Rotoscoliosis 24-Mar-2025 16:39:03  Gopi Cao  

## 2025-03-26 NOTE — PROGRESS NOTE ADULT - SUBJECTIVE AND OBJECTIVE BOX
KIM ROLON    [id number]    History:      seen and examined this am  s/p anterior exposure for 2 level lumbar fusion    Patient is doing well  pressors were weaned, but patient required episodic fluid bolus for hypotension  no reported  nausea, vomiting, chest pain, shortness of breath, abdominal pain or fever    Vital Signs Last 24 Hrs  T(C): 36.6 (26 Mar 2025 04:06), Max: 37.6 (25 Mar 2025 21:19)  T(F): 97.8 (26 Mar 2025 04:06), Max: 99.7 (25 Mar 2025 21:19)  HR: 78 (26 Mar 2025 07:20) (74 - 101)  BP: 140/87 (26 Mar 2025 07:00) (86/58 - 140/87)  BP(mean): 103 (26 Mar 2025 07:00) (58 - 103)  RR: 21 (26 Mar 2025 07:20) (14 - 25)  SpO2: 97% (26 Mar 2025 07:20) (94% - 100%)    Parameters below as of 25 Mar 2025 16:00  Patient On (Oxygen Delivery Method): room air      I&O's Summary    25 Mar 2025 07:01  -  26 Mar 2025 07:00  --------------------------------------------------------  IN: 3029.8 mL / OUT: 2595 mL / NET: 434.8 mL                              9.5    9.84  )-----------( 245      ( 26 Mar 2025 03:26 )             27.4     03-26    138  |  105  |  12.6  ----------------------------<  125[H]  3.7   |  25.0  |  0.42[L]    Ca    8.0[L]      26 Mar 2025 03:26  Phos  1.9     03-26  Mg     2.0     03-26    TPro  4.9[L]  /  Alb  3.4  /  TBili  1.0  /  DBili  x   /  AST  41[H]  /  ALT  18  /  AlkPhos  42  03-25      MEDICATIONS  (STANDING):  influenza  Vaccine (HIGH DOSE) 0.5 milliLiter(s) IntraMuscular once  potassium phosphate IVPB 30 milliMole(s) IV Intermittent once    MEDICATIONS  (PRN):      Physical exam:     no distress  non labored   abdomen is soft  incision site benign.   no peripheral perfusion deficits       Primary Assessment:  s/p anterior exposure for 2 level lumbar fusion   surgically stable from a vascular surgical standpoint  •   Plan:   • care per primary team  - wound Steri-strip to remain in place

## 2025-03-26 NOTE — PHYSICAL THERAPY INITIAL EVALUATION ADULT - GENERAL OBSERVATIONS, REHAB EVAL
would like test results from lab work and urine test Pt received in bed + PCA + IV + a-line + tele//BP monitoring + VCBs + alegria + SHEILA drain, in 8/10 spine pain, agreeable to PT evaluation

## 2025-03-27 LAB
ANION GAP SERPL CALC-SCNC: 11 MMOL/L — SIGNIFICANT CHANGE UP (ref 5–17)
APTT BLD: 22.8 SEC — LOW (ref 24.5–35.6)
BASOPHILS # BLD AUTO: 0.01 K/UL — SIGNIFICANT CHANGE UP (ref 0–0.2)
BASOPHILS NFR BLD AUTO: 0.1 % — SIGNIFICANT CHANGE UP (ref 0–2)
BUN SERPL-MCNC: 12.7 MG/DL — SIGNIFICANT CHANGE UP (ref 8–20)
CALCIUM SERPL-MCNC: 7.2 MG/DL — LOW (ref 8.4–10.5)
CHLORIDE SERPL-SCNC: 101 MMOL/L — SIGNIFICANT CHANGE UP (ref 96–108)
CO2 SERPL-SCNC: 24 MMOL/L — SIGNIFICANT CHANGE UP (ref 22–29)
CREAT SERPL-MCNC: 0.43 MG/DL — LOW (ref 0.5–1.3)
EGFR: 107 ML/MIN/1.73M2 — SIGNIFICANT CHANGE UP
EGFR: 107 ML/MIN/1.73M2 — SIGNIFICANT CHANGE UP
EOSINOPHIL # BLD AUTO: 0 K/UL — SIGNIFICANT CHANGE UP (ref 0–0.5)
EOSINOPHIL NFR BLD AUTO: 0 % — SIGNIFICANT CHANGE UP (ref 0–6)
GLUCOSE SERPL-MCNC: 114 MG/DL — HIGH (ref 70–99)
HCT VFR BLD CALC: 20.6 % — CRITICAL LOW (ref 34.5–45)
HCT VFR BLD CALC: 24.3 % — LOW (ref 34.5–45)
HCT VFR BLD CALC: 28.5 % — LOW (ref 34.5–45)
HGB BLD-MCNC: 7.1 G/DL — LOW (ref 11.5–15.5)
HGB BLD-MCNC: 8.1 G/DL — LOW (ref 11.5–15.5)
HGB BLD-MCNC: 9.6 G/DL — LOW (ref 11.5–15.5)
IMM GRANULOCYTES # BLD AUTO: 0.11 K/UL — HIGH (ref 0–0.07)
IMM GRANULOCYTES NFR BLD AUTO: 1.2 % — HIGH (ref 0–0.9)
INR BLD: 0.9 RATIO — SIGNIFICANT CHANGE UP (ref 0.85–1.16)
LYMPHOCYTES # BLD AUTO: 0.65 K/UL — LOW (ref 1–3.3)
LYMPHOCYTES NFR BLD AUTO: 7.1 % — LOW (ref 13–44)
MAGNESIUM SERPL-MCNC: 1.9 MG/DL — SIGNIFICANT CHANGE UP (ref 1.8–2.6)
MCHC RBC-ENTMCNC: 29.8 PG — SIGNIFICANT CHANGE UP (ref 27–34)
MCHC RBC-ENTMCNC: 30.2 PG — SIGNIFICANT CHANGE UP (ref 27–34)
MCHC RBC-ENTMCNC: 30.9 PG — SIGNIFICANT CHANGE UP (ref 27–34)
MCHC RBC-ENTMCNC: 33.3 G/DL — SIGNIFICANT CHANGE UP (ref 32–36)
MCHC RBC-ENTMCNC: 33.7 G/DL — SIGNIFICANT CHANGE UP (ref 32–36)
MCHC RBC-ENTMCNC: 34.5 G/DL — SIGNIFICANT CHANGE UP (ref 32–36)
MCV RBC AUTO: 88.5 FL — SIGNIFICANT CHANGE UP (ref 80–100)
MCV RBC AUTO: 89.6 FL — SIGNIFICANT CHANGE UP (ref 80–100)
MCV RBC AUTO: 90.7 FL — SIGNIFICANT CHANGE UP (ref 80–100)
MONOCYTES # BLD AUTO: 0.82 K/UL — SIGNIFICANT CHANGE UP (ref 0–0.9)
MONOCYTES NFR BLD AUTO: 8.9 % — SIGNIFICANT CHANGE UP (ref 2–14)
NEUTROPHILS # BLD AUTO: 7.59 K/UL — HIGH (ref 1.8–7.4)
NEUTROPHILS NFR BLD AUTO: 82.7 % — HIGH (ref 43–77)
NRBC # BLD AUTO: 0 K/UL — SIGNIFICANT CHANGE UP (ref 0–0)
NRBC # BLD AUTO: 0 K/UL — SIGNIFICANT CHANGE UP (ref 0–0)
NRBC # BLD AUTO: 0.02 K/UL — HIGH (ref 0–0)
NRBC # FLD: 0 K/UL — SIGNIFICANT CHANGE UP (ref 0–0)
NRBC # FLD: 0 K/UL — SIGNIFICANT CHANGE UP (ref 0–0)
NRBC # FLD: 0.02 K/UL — HIGH (ref 0–0)
NRBC BLD AUTO-RTO: 0 /100 WBCS — SIGNIFICANT CHANGE UP (ref 0–0)
PHOSPHATE SERPL-MCNC: 2.9 MG/DL — SIGNIFICANT CHANGE UP (ref 2.4–4.7)
PLATELET # BLD AUTO: 159 K/UL — SIGNIFICANT CHANGE UP (ref 150–400)
PLATELET # BLD AUTO: 162 K/UL — SIGNIFICANT CHANGE UP (ref 150–400)
PLATELET # BLD AUTO: 170 K/UL — SIGNIFICANT CHANGE UP (ref 150–400)
PMV BLD: 8.6 FL — SIGNIFICANT CHANGE UP (ref 7–13)
PMV BLD: 9.1 FL — SIGNIFICANT CHANGE UP (ref 7–13)
PMV BLD: 9.2 FL — SIGNIFICANT CHANGE UP (ref 7–13)
POTASSIUM SERPL-MCNC: 4.9 MMOL/L — SIGNIFICANT CHANGE UP (ref 3.5–5.3)
POTASSIUM SERPL-SCNC: 4.9 MMOL/L — SIGNIFICANT CHANGE UP (ref 3.5–5.3)
PROTHROM AB SERPL-ACNC: 10.4 SEC — SIGNIFICANT CHANGE UP (ref 9.9–13.4)
RBC # BLD: 2.3 M/UL — LOW (ref 3.8–5.2)
RBC # BLD: 2.68 M/UL — LOW (ref 3.8–5.2)
RBC # BLD: 3.22 M/UL — LOW (ref 3.8–5.2)
RBC # FLD: 14 % — SIGNIFICANT CHANGE UP (ref 10.3–14.5)
RBC # FLD: 14.2 % — SIGNIFICANT CHANGE UP (ref 10.3–14.5)
RBC # FLD: 15.5 % — HIGH (ref 10.3–14.5)
SODIUM SERPL-SCNC: 136 MMOL/L — SIGNIFICANT CHANGE UP (ref 135–145)
WBC # BLD: 8.57 K/UL — SIGNIFICANT CHANGE UP (ref 3.8–10.5)
WBC # BLD: 9.18 K/UL — SIGNIFICANT CHANGE UP (ref 3.8–10.5)
WBC # BLD: 9.2 K/UL — SIGNIFICANT CHANGE UP (ref 3.8–10.5)
WBC # FLD AUTO: 8.57 K/UL — SIGNIFICANT CHANGE UP (ref 3.8–10.5)
WBC # FLD AUTO: 9.18 K/UL — SIGNIFICANT CHANGE UP (ref 3.8–10.5)
WBC # FLD AUTO: 9.2 K/UL — SIGNIFICANT CHANGE UP (ref 3.8–10.5)

## 2025-03-27 PROCEDURE — 36415 COLL VENOUS BLD VENIPUNCTURE: CPT

## 2025-03-27 PROCEDURE — 85730 THROMBOPLASTIN TIME PARTIAL: CPT

## 2025-03-27 PROCEDURE — G0463: CPT

## 2025-03-27 PROCEDURE — 86850 RBC ANTIBODY SCREEN: CPT

## 2025-03-27 PROCEDURE — 83036 HEMOGLOBIN GLYCOSYLATED A1C: CPT

## 2025-03-27 PROCEDURE — 85610 PROTHROMBIN TIME: CPT

## 2025-03-27 PROCEDURE — 74018 RADEX ABDOMEN 1 VIEW: CPT | Mod: 26

## 2025-03-27 PROCEDURE — 86901 BLOOD TYPING SEROLOGIC RH(D): CPT

## 2025-03-27 PROCEDURE — 85025 COMPLETE CBC W/AUTO DIFF WBC: CPT

## 2025-03-27 PROCEDURE — 87641 MR-STAPH DNA AMP PROBE: CPT

## 2025-03-27 PROCEDURE — 86900 BLOOD TYPING SEROLOGIC ABO: CPT

## 2025-03-27 PROCEDURE — 87640 STAPH A DNA AMP PROBE: CPT

## 2025-03-27 PROCEDURE — 86923 COMPATIBILITY TEST ELECTRIC: CPT

## 2025-03-27 PROCEDURE — 99223 1ST HOSP IP/OBS HIGH 75: CPT

## 2025-03-27 PROCEDURE — 99233 SBSQ HOSP IP/OBS HIGH 50: CPT

## 2025-03-27 PROCEDURE — 80048 BASIC METABOLIC PNL TOTAL CA: CPT

## 2025-03-27 RX ORDER — MAGNESIUM SULFATE 500 MG/ML
2 SYRINGE (ML) INJECTION ONCE
Refills: 0 | Status: COMPLETED | OUTPATIENT
Start: 2025-03-27 | End: 2025-03-27

## 2025-03-27 RX ORDER — ENOXAPARIN SODIUM 100 MG/ML
40 INJECTION SUBCUTANEOUS ONCE
Refills: 0 | Status: COMPLETED | OUTPATIENT
Start: 2025-03-27 | End: 2025-03-27

## 2025-03-27 RX ORDER — POLYETHYLENE GLYCOL 3350 17 G/17G
17 POWDER, FOR SOLUTION ORAL EVERY 24 HOURS
Refills: 0 | Status: DISCONTINUED | OUTPATIENT
Start: 2025-03-27 | End: 2025-03-31

## 2025-03-27 RX ORDER — HYDROMORPHONE/SOD CHLOR,ISO/PF 2 MG/10 ML
2 SYRINGE (ML) INJECTION EVERY 4 HOURS
Refills: 0 | Status: DISCONTINUED | OUTPATIENT
Start: 2025-03-27 | End: 2025-03-31

## 2025-03-27 RX ORDER — ALBUMIN (HUMAN) 12.5 G/50ML
250 INJECTION, SOLUTION INTRAVENOUS
Refills: 0 | Status: COMPLETED | OUTPATIENT
Start: 2025-03-27 | End: 2025-03-27

## 2025-03-27 RX ORDER — HYDROMORPHONE/SOD CHLOR,ISO/PF 2 MG/10 ML
1 SYRINGE (ML) INJECTION EVERY 4 HOURS
Refills: 0 | Status: DISCONTINUED | OUTPATIENT
Start: 2025-03-27 | End: 2025-03-31

## 2025-03-27 RX ORDER — HYDROMORPHONE/SOD CHLOR,ISO/PF 2 MG/10 ML
0.5 SYRINGE (ML) INJECTION EVERY 4 HOURS
Refills: 0 | Status: DISCONTINUED | OUTPATIENT
Start: 2025-03-27 | End: 2025-03-31

## 2025-03-27 RX ADMIN — Medication 40 MILLIGRAM(S): at 06:11

## 2025-03-27 RX ADMIN — Medication 975 MILLIGRAM(S): at 13:43

## 2025-03-27 RX ADMIN — METHOCARBAMOL 500 MILLIGRAM(S): 500 TABLET, FILM COATED ORAL at 21:05

## 2025-03-27 RX ADMIN — Medication 975 MILLIGRAM(S): at 21:35

## 2025-03-27 RX ADMIN — METHOCARBAMOL 500 MILLIGRAM(S): 500 TABLET, FILM COATED ORAL at 06:11

## 2025-03-27 RX ADMIN — POLYETHYLENE GLYCOL 3350 17 GRAM(S): 17 POWDER, FOR SOLUTION ORAL at 19:11

## 2025-03-27 RX ADMIN — Medication 1 APPLICATION(S): at 11:54

## 2025-03-27 RX ADMIN — Medication 975 MILLIGRAM(S): at 21:05

## 2025-03-27 RX ADMIN — ALBUMIN (HUMAN) 125 MILLILITER(S): 12.5 INJECTION, SOLUTION INTRAVENOUS at 01:58

## 2025-03-27 RX ADMIN — Medication 975 MILLIGRAM(S): at 06:34

## 2025-03-27 RX ADMIN — Medication 2000 MILLIGRAM(S): at 04:56

## 2025-03-27 RX ADMIN — METHOCARBAMOL 500 MILLIGRAM(S): 500 TABLET, FILM COATED ORAL at 13:43

## 2025-03-27 RX ADMIN — Medication 975 MILLIGRAM(S): at 14:00

## 2025-03-27 RX ADMIN — SODIUM CHLORIDE 75 MILLILITER(S): 9 INJECTION, SOLUTION INTRAVENOUS at 06:10

## 2025-03-27 RX ADMIN — Medication 2 MILLIGRAM(S): at 17:23

## 2025-03-27 RX ADMIN — Medication 2 MILLIGRAM(S): at 18:13

## 2025-03-27 RX ADMIN — Medication 975 MILLIGRAM(S): at 06:11

## 2025-03-27 RX ADMIN — Medication 25 GRAM(S): at 06:11

## 2025-03-27 RX ADMIN — PREGABALIN 75 MILLIGRAM(S): 75 CAPSULE ORAL at 11:53

## 2025-03-27 RX ADMIN — ALBUMIN (HUMAN) 125 MILLILITER(S): 12.5 INJECTION, SOLUTION INTRAVENOUS at 04:21

## 2025-03-27 RX ADMIN — Medication 30 MILLILITER(S): at 07:07

## 2025-03-27 RX ADMIN — Medication 3 MILLIGRAM(S): at 21:05

## 2025-03-27 RX ADMIN — Medication 0.5 MILLIGRAM(S): at 20:36

## 2025-03-27 RX ADMIN — Medication 2 TABLET(S): at 21:05

## 2025-03-27 RX ADMIN — Medication 30 MILLILITER(S): at 17:23

## 2025-03-27 RX ADMIN — Medication 0.5 MILLIGRAM(S): at 21:06

## 2025-03-27 RX ADMIN — ENOXAPARIN SODIUM 40 MILLIGRAM(S): 100 INJECTION SUBCUTANEOUS at 19:11

## 2025-03-27 NOTE — PROGRESS NOTE ADULT - SUBJECTIVE AND OBJECTIVE BOX
ORTHO-SPINE POST-OP PROGRESS NOTE:  [id number]  KIM ROLON  PROCEDURE: Lateral lumbar fusion L2-4, anterior Lumbar fusion L4-S1, T6 to pelvic fusion  DOS: 3/26/2025    SUBJECTIVE: 64F s/p Lateral lumbar fusion L2-4, anterior Lumbar fusion L4-S1, T6 to pelvic fusion POD 1. Patient was interviewed and examined at bedside this morning. Patient reports of moderate discomfort that is controlled by pain medications. Patient denies of acute sensory or motor changes.   Drain: 620 o/n, 240 since 7AM               8.1    9.18  )-----------( 162      ( 27 Mar 2025 03:30 )             24.3     I&O's Detail    26 Mar 2025 07:01  -  27 Mar 2025 07:00  --------------------------------------------------------  IN:    IV PiggyBack: 100 mL    IV PiggyBack: 480 mL    IV PiggyBack: 150 mL    Lactated Ringers: 1200 mL    multiple electrolytes Injection Type 1 Bolus: 2000 mL  Total IN: 3930 mL    OUT:    Bulb (mL): 900 mL    Indwelling Catheter - Urethral (mL): 1150 mL  Total OUT: 2050 mL    Total NET: 1880 mL      27 Mar 2025 07:01  -  27 Mar 2025 10:37  --------------------------------------------------------  IN:    Lactated Ringers: 150 mL  Total IN: 150 mL    OUT:    Bulb (mL): 190 mL    Indwelling Catheter - Urethral (mL): 100 mL  Total OUT: 290 mL    Total NET: -140 mL    acetaminophen     Tablet .. 975 milliGRAM(s) Oral every 8 hours  aluminum hydroxide/magnesium hydroxide/simethicone Suspension 30 milliLiter(s) Oral every 12 hours PRN  chlorhexidine 2% Cloths 1 Application(s) Topical daily  enoxaparin Injectable 40 milliGRAM(s) SubCutaneous every 24 hours  HYDROmorphone  Injectable 0.5 milliGRAM(s) IV Push every 4 hours PRN  HYDROmorphone PCA (1 mG/mL) 30 milliLiter(s) PCA Continuous PCA Continuous  influenza  Vaccine (HIGH DOSE) 0.5 milliLiter(s) IntraMuscular once  ketorolac   Injectable 30 milliGRAM(s) IV Push every 6 hours PRN  magnesium hydroxide Suspension 30 milliLiter(s) Oral every 12 hours PRN  melatonin 3 milliGRAM(s) Oral at bedtime PRN  methocarbamol 500 milliGRAM(s) Oral every 8 hours  ondansetron Injectable 4 milliGRAM(s) IV Push every 6 hours PRN  pantoprazole    Tablet 40 milliGRAM(s) Oral before breakfast  pregabalin 75 milliGRAM(s) Oral daily  senna 2 Tablet(s) Oral at bedtime  valsartan 80 milliGRAM(s) Oral daily    T(C): 36.7 (03-27-25 @ 08:51), Max: 37.2 (03-27-25 @ 04:00)  HR: 102 (03-27-25 @ 09:00) (72 - 102)  BP: 114/71 (03-27-25 @ 09:00) (84/62 - 114/71)  RR: 22 (03-27-25 @ 09:00) (10 - 26)  SpO2: 92% (03-27-25 @ 09:00) (88% - 100%)  Wt(kg): --    PHYSICAL EXAM:     Appearance: Alert, responsive, NAD  Dressing: mep dressings in place, C/D/I, no active bleeding, no staining, not discharge noted. Drain in place, ~50 ml output at time of visit  Skin: no rash on visible skin. Skin is clean, dry and intact. No bleeding. No abrasions. No ulcerations.           Sensation:          Upper extremity             Grossly intact manually         Lower extremity             sp         dp         saph       marie         tibial                                                R          +           +             +           +          +                                               L           +           +             +           +          +                     Motor exam:          Upper extremity              Bi(c5)  WE(c6)  EE(c7)   FF(c8)                                                R         5/5        5/5        5/5       5/5                                               L          5/5        5/5        5/5       5/5         Lower extremity                        HF(l2)   KE(l3)    TA(l4)   EHL(l5)  GS(s1)                                                 R          5/5        5/5       5/5       5/5         5/5                                               L         4/5        5/5       5/5       5/5          5/5                                                     Vascular:  + warm well perfused; capillary refill <3 seconds                                                     A/P :  64F s/p lateral lumbar fusion L2-4, anterior Lumbar fusion L4-S1, T6 to pelvic fusion POD #1  - Pain control as clinically indicated  - DVT ppx: Lov40 QD, SCDs    - PT and OOB today  - Monitor drain output  - Weight bearing status: WBAT  - Dispo: Home vs [facility]

## 2025-03-27 NOTE — PROGRESS NOTE ADULT - SUBJECTIVE AND OBJECTIVE BOX
KIM ОЛЬГА    [id number]    History:     patient seen and examined this am  s/p anterior lumbar exposure for fusion  patient underwent posterior portion of procedure yesterday  no acute events reported    no reported nausea, vomiting, chest pain, shortness of breath, abdominal pain       Vital Signs Last 24 Hrs  T(C): 36.7 (27 Mar 2025 08:51), Max: 37.2 (27 Mar 2025 04:00)  T(F): 98.1 (27 Mar 2025 08:51), Max: 98.9 (27 Mar 2025 04:00)  HR: 102 (27 Mar 2025 08:00) (72 - 102)  BP: 108/65 (27 Mar 2025 08:00) (84/62 - 112/72)  BP(mean): 78 (27 Mar 2025 08:00) (66 - 85)  RR: 26 (27 Mar 2025 08:00) (10 - 26)  SpO2: 91% (27 Mar 2025 08:00) (88% - 100%)    Parameters below as of 27 Mar 2025 08:00  Patient On (Oxygen Delivery Method): room air      I&O's Summary    26 Mar 2025 07:01  -  27 Mar 2025 07:00  --------------------------------------------------------  IN: 3930 mL / OUT: 2050 mL / NET: 1880 mL    27 Mar 2025 07:01  -  27 Mar 2025 09:07  --------------------------------------------------------  IN: 150 mL / OUT: 140 mL / NET: 10 mL                              8.1    9.18  )-----------( 162      ( 27 Mar 2025 03:30 )             24.3     03-27    136  |  101  |  12.7  ----------------------------<  114[H]  4.9   |  24.0  |  0.43[L]    Ca    7.2[L]      27 Mar 2025 03:30  Phos  2.9     03-27  Mg     1.9     03-27        MEDICATIONS  (STANDING):  acetaminophen     Tablet .. 975 milliGRAM(s) Oral every 8 hours  chlorhexidine 2% Cloths 1 Application(s) Topical daily  enoxaparin Injectable 40 milliGRAM(s) SubCutaneous every 24 hours  HYDROmorphone PCA (1 mG/mL) 30 milliLiter(s) PCA Continuous PCA Continuous  influenza  Vaccine (HIGH DOSE) 0.5 milliLiter(s) IntraMuscular once  methocarbamol 500 milliGRAM(s) Oral every 8 hours  pantoprazole    Tablet 40 milliGRAM(s) Oral before breakfast  pregabalin 75 milliGRAM(s) Oral daily  senna 2 Tablet(s) Oral at bedtime  valsartan 80 milliGRAM(s) Oral daily    MEDICATIONS  (PRN):  aluminum hydroxide/magnesium hydroxide/simethicone Suspension 30 milliLiter(s) Oral every 12 hours PRN Indigestion  HYDROmorphone  Injectable 0.5 milliGRAM(s) IV Push every 4 hours PRN SEVERE BREAKTHROUGH PAIN NOT CONTROLLED BY CURRENT MEDS  ketorolac   Injectable 30 milliGRAM(s) IV Push every 6 hours PRN SEVERE BREAKTHROUGH PAIN NOT CONTROLLED BY CURRENT MEDS  magnesium hydroxide Suspension 30 milliLiter(s) Oral every 12 hours PRN Constipation  melatonin 3 milliGRAM(s) Oral at bedtime PRN Insomnia  ondansetron Injectable 4 milliGRAM(s) IV Push every 6 hours PRN Nausea and/or Vomiting      Physical exam:     no distress  abdomen is soft, non tender to light palpation  incision with mepilex dressing, clean, no surrounding erythema .    Primary  Assessment:  • s/p, anterior lumbar exposure  surgical site benign    •   Plan:   • care per primary team   - dr. howard to see patient later today, then likely signoff

## 2025-03-27 NOTE — DIETITIAN INITIAL EVALUATION ADULT - PERTINENT MEDS FT
MEDICATIONS  (STANDING):  methocarbamol 500 milliGRAM(s) Oral every 8 hours  pantoprazole    Tablet 40 milliGRAM(s) Oral before breakfast  senna 2 Tablet(s) Oral at bedtime  valsartan 80 milliGRAM(s) Oral daily    MEDICATIONS  (PRN):  ondansetron Injectable 4 milliGRAM(s) IV Push every 6 hours PRN Nausea and/or Vomiting

## 2025-03-27 NOTE — CONSULT NOTE ADULT - ASSESSMENT
ASSESSMENT: 64y female PMHx HTN, Duodenal spasm, osteoporosis, Scoliosis, s/p b/l shoulder, s/p lumpectomy s/p anterior then lateral lumbar fusion  POD#0, staged procedure and will go to OR for posterior thoracic to pelvis fusion 3/26/25.       Admitting Dx: Scoliosis   Complicated By: #Hypotension  PMHx: #HTN, Duodenal spasm, osteoporosis, Scoliosis, s/p b/l shoulder, s/p lumpectomy    PLAN:    Neurologic:   - Multimodal pain regimen - dilaudid PCA per ortho  - Delirium precautions  - Optimize sleep/wake cycle     Cardiovascular:   #Hypotension  - Maintain MAP > 65  - Continue hemodynamic monitoring  - 1L, albumin, 1 PRBC in PACU - will repeat labs  - Shock state likely hypovolemic - will POCUS, consider TTE  - Wean pressors    Pulmonary:   - Maintain sPO2 >92%    Gastrointestinal:   - Regular  - Bowel regimen  - Monitor bowel movements    Nephrology:   - Monitor kidney fxn  - Monitor UOP  - Replete electrolytes PRN (Mg >2, Phos >3, K >4)  - Riley for strict I&O    Infectious Disease:  #Perioperative ABx  - Monitor fever curve  - Monitor for leukocytosis  - Antibiotics per Ortho    Endocrinology:  - Monitor blood glucose  - Maintain euglycemia, 140-180    Heme/DVT Prophylaxis:  - SCDs  - Monitor H/H  - Chemical DVT prophylaxis x 1 dose per ortho then hold in PM for RTOR    Skin:  - Repositioning for DTI prevention while in bed    Orthopedic: #s/p anterior lumbar interbody fusion L4-L5, L5-S1 Lateral Fusion L2-L3, L3-L  - Posterior instrumented FusionT9 Pelvis, Possible T4-Pelvis on 3/26/25   - Weight Bearing Status: WBAT, LSO not ordered - ortho to order if needed  - Appreciate ortho recs    Disposition:  - Care per SICU    CODE STATUS: Presumed Full
65 y/o female with Hx of HTN, Duodenal spasm, osteoporosis, Scoliosis, s/p b/l shoulder, s/p lumpectomy, she has complex adult deformity r/t scoliosis which she has undergone many years of conservative care including injections physical therapy core strengthening home exercise excetra. Based upon progressive pain progressive lumbar spondylosis progressive deformity patient wishes to consider surgical management in order to help her current pain levels deformity and progression of her deformity into the future, her symptoms started  year(s) ago, came in here for elective anterior lumbar interbody fusion L4-L5, L5-S1 Lateral Fusion L2-L3, L3-L4 on 3/24/2025 AND Posterior instrumented FusionT9 Pelvis, Possible T4-Pelvis on 3/26/25 with Dr. Cao s/p procedure, she was monitored in ICU post operatively, during the course she has hypocalcemia, her calcium was replaced, require pressors over the course of ICU, now has been weaned off, she also require 1 unit of PRBCs as well for acute blood loss anemia, patient has been weaned off from the Pressors, on PCA pump for the pain, Medicine consulted for Medical Management.        Plan:     Scoliosis s/p  anterior lumbar interbody fusion L4-L5, L5-S1 Lateral Fusion L2-L3, L3-L4 on 3/24/2025 AND Posterior instrumented FusionT9 Pelvis, Possible T4-Pelvis on 3/26/25      - VS stable  - abx per ortho   - c/w anti hypertensive to be restarted post op day 02 except if blood pressure goes >150 systolic   - opiate induced constipation regimen   - encouraging incentive spirometry   -c/w local wound care per ortho   -DVT prophylaxis and Pain meds as per Ortho team   -PT/OT and weight bearing per ortho   -cyclobenzaprine 10 mg once a day  -on PCA pump  -pregabalin 75 mg once a day    Acute blood loss anemia due to procedure: Iron supplement, will monitor CBC, if trends further down, will transfuse    Hx of HTN: will continue with valsartan-hydrochlorothiazide 80 mg-12.5 mg once a day (at bedtime) with holding parameters     Osteoporosis: On Boniva 150 mg every 4 weeks
69-year-old female with a PMH of HTN, osteoporosis, and scoliosis, and admitted for surgical correction of progressive adult spinal deformity. Underwent anterior lumbar interbody fusion L4-L5, L5-S1, and lateral fusion L2-L3, L3-L4 on 3/24/25. Planned for posterior instrumented fusion T9-pelvis (possible T4-pelvis) on 3/26/2 with postoperative hypotension and hypovolemic shock post-op.    Med Recs:  -DC PCA  - Recommend starting hydromorphone PO 1mg/2mg [id number] PRN mod/severe pain  - Recommend starting hydromorphone IVP 0.5mg q4hour PRN breakthrough pain  - rest of care and disop per SICU and Ortho teams    Discussed with Dr. Corley

## 2025-03-27 NOTE — DIETITIAN INITIAL EVALUATION ADULT - PERTINENT LABORATORY DATA
03-27    136  |  101  |  12.7  ----------------------------<  114[H]  4.9   |  24.0  |  0.43[L]    Ca    7.2[L]      27 Mar 2025 03:30  Phos  2.9     03-27  Mg     1.9     03-27    A1C with Estimated Average Glucose Result: 5.6 % (03-07-25 @ 17:00)

## 2025-03-27 NOTE — DIETITIAN INITIAL EVALUATION ADULT - ORAL INTAKE PTA/DIET HISTORY
Pt reports eating well PTA. Pt does not eat breakfast and eats a good Lunch and Dinner and does not eat many Carbohydrates. Pt reports has been eating this way for many years and has had no weight changes. Pt reports UBW ~96lbs. No difficulties chewing or swallowing noted. Encouraged HBV protein foods to promote healing.

## 2025-03-27 NOTE — DIETITIAN INITIAL EVALUATION ADULT - OTHER INFO
Pt is a 69-year-old female with a PMH of HTN, osteoporosis, and scoliosis, and admitted for surgical correction of progressive adult spinal deformity. Underwent anterior lumbar interbody fusion L4-L5, L5-S1, and lateral fusion L2-L3, L3-L4 on 3/24/25. Planned for posterior instrumented fusion T9-pelvis (possible T4-pelvis) on 3/26/2 with postoperative hypotension and hypovolemic shock post-op.

## 2025-03-27 NOTE — OCCUPATIONAL THERAPY INITIAL EVALUATION ADULT - MD ORDER
POAG, OU: I have explained to the patient at length regarding the diagnosis of primary open angle glaucoma and its pathophysiology. I have discussed the various treatment options including medications and surgery. I recommend that the patient begin medical treatment. I emphasized to the patient the importance of compliance with treatment and follow-up appointments. OT consult and treat

## 2025-03-27 NOTE — PROGRESS NOTE ADULT - SUBJECTIVE AND OBJECTIVE BOX
Interval events and physical exam included with assessment and plan below    Objective   T(C): 36.9 (03-27-25 @ 13:36), Max: 37.2 (03-27-25 @ 04:00)  HR: 95 (03-27-25 @ 13:30) (72 - 102)  BP: 101/71 (03-27-25 @ 13:00) (84/62 - 114/71)  RR: 25 (03-27-25 @ 13:30) (10 - 26)  SpO2: 94% (03-27-25 @ 12:00) (88% - 100%)      03-26-25 @ 07:01  -  03-27-25 @ 07:00  --------------------------------------------------------  IN: 3930 mL / OUT: 2050 mL / NET: 1880 mL    03-27-25 @ 07:01  -  03-27-25 @ 13:43  --------------------------------------------------------  IN: 483 mL / OUT: 350 mL / NET: 133 mL        HOME MEDS:  arthritis tylenol 2 3x per daily:   Boniva 150 mg oral tablet: 1 tab(s) orally every 4 weeks  citracal slow release 1200 mg once:   cyclobenzaprine 10 mg oral tablet: 1 tab(s) orally once a day  fish oil once daily:   pregabalin 75 mg oral capsule: 1 cap(s) orally once a day  valsartan-hydrochlorothiazide 80 mg-12.5 mg oral tablet: 1 tab(s) orally once a day (at bedtime)  vitamin C once daily 1000 mg daily:       CURRENT MEDS:   acetaminophen     Tablet .. 975 milliGRAM(s) Oral every 8 hours  aluminum hydroxide/magnesium hydroxide/simethicone Suspension 30 milliLiter(s) Oral every 12 hours PRN  chlorhexidine 2% Cloths 1 Application(s) Topical daily  HYDROmorphone   Tablet 1 milliGRAM(s) Oral every 4 hours PRN  HYDROmorphone   Tablet 2 milliGRAM(s) Oral every 4 hours PRN  influenza  Vaccine (HIGH DOSE) 0.5 milliLiter(s) IntraMuscular once  ketorolac   Injectable 30 milliGRAM(s) IV Push every 6 hours PRN  magnesium hydroxide Suspension 30 milliLiter(s) Oral every 12 hours PRN  melatonin 3 milliGRAM(s) Oral at bedtime PRN  methocarbamol 500 milliGRAM(s) Oral every 8 hours  ondansetron Injectable 4 milliGRAM(s) IV Push every 6 hours PRN  pantoprazole    Tablet 40 milliGRAM(s) Oral before breakfast  pregabalin 75 milliGRAM(s) Oral daily  senna 2 Tablet(s) Oral at bedtime

## 2025-03-27 NOTE — PROGRESS NOTE ADULT - ASSESSMENT
69-year-old female with a PMH of HTN, osteoporosis, and scoliosis, and admitted for surgical correction of progressive adult spinal deformity. Underwent anterior lumbar interbody fusion L4-L5, L5-S1, and lateral fusion L2-L3, L3-L4 on 3/24/25. Planned for posterior instrumented fusion T9-pelvis (possible T4-pelvis) on 3/26/2 with postoperative hypotension and hypovolemic shock post-op.    3/25: Received 1 unit PRBC and 2L IVF post-op for hypotension. Started on norepinephrine briefly, now off.  1L + 500cc albumin     Neuro:  A&O x3. No focal deficits.  #Post-op pain: PCA in place. Multimodal regimen with Tylenol, Dilaudid, pregabalin, and Robaxin.  #Q4 neuro checks: Continue routine monitoring.  #s/p laminectomy/ fusion: q4 neurochecks, appreciate ortho recs,  monitor SHEILA output     Pulm:  Breathing comfortably on room air.  #Pulmonary toilet: Encourage incentive spirometry. Monitor for signs of atelectasis or hypoventilation.    Cardiac:  NSR. Remains off norepinephrine.  #Hypovolemic shock:  Continue monitoring MAP >65.  #Hypertension: Resume home valsartan-HCTZ once euvolemic and stable.    GI:  Abdomen soft, non-tender. Tolerating DASH diet.  #Bowel regimen: Initiate senna and miralax. Monitor for return of bowel function.    :  Alegria catheter in place.  #Monitor UOP closely. Discontinue the alegria     Endo:  #s/p laminectomy: completed dexamethasone course. Monitor for hyperglycemia.  #Osteoporosis: Continue home Boniva when able    Heme/DVT Prophylaxis:  #DVT prophylaxis: SCDs and Lovenox resumed. .  #Acute blood loss anemia: Received 1 unit PRBC 3/25. Monitor H/H. Repeat CBC at 10     ID:  #Prophylaxis: s/p Cefazolin perioperatively. No signs of infection.    Skin:  #Pressure ulcer prevention: Reposition frequently. Skin intact.    Disposition:  SICU, possible transfer to floor with stable CBC 69-year-old female with a PMH of HTN, osteoporosis, and scoliosis, and admitted for surgical correction of progressive adult spinal deformity. Underwent anterior lumbar interbody fusion L4-L5, L5-S1, and lateral fusion L2-L3, L3-L4 on 3/24/25. Planned for posterior instrumented fusion T9-pelvis (possible T4-pelvis) on 3/26/2 with postoperative hypotension and hypovolemic shock post-op.    3/25: Received 1 unit PRBC and 2L IVF post-op for hypotension. Started on norepinephrine briefly, now off.  3/26: OR for posterior T7-pelvis funsion  3/27: SBP 90s responded well to 1L + 500cc albumin. HGB downtrending, SHEILA with sangunious drainage     Neuro:  A&O x3. No focal deficits.  #Post-op pain: PCA in place. Multimodal regimen with Tylenol, Dilaudid, pregabalin, and Robaxin.  #Q4 neuro checks: Continue routine monitoring.  #s/p laminectomy/ fusion: q4 neurochecks, appreciate ortho recs,  monitor SHEILA output     Pulm:  Breathing comfortably on room air.  #Pulmonary toilet: Encourage incentive spirometry. Monitor for signs of atelectasis or hypoventilation.    Cardiac:  NSR. Remains off norepinephrine.  #Hypovolemic shock:  Continue monitoring MAP >65.  #Hypertension: Resume home valsartan-HCTZ once euvolemic and stable.    GI:  Abdomen soft, non-tender. Tolerating DASH diet.  #Bowel regimen: Initiate senna and miralax. Monitor for return of bowel function.    :  Alegria catheter in place.  #Monitor UOP closely. Discontinue the alegria     Endo:  #s/p laminectomy: completed dexamethasone course. Monitor for hyperglycemia.  #Osteoporosis: Continue home Boniva when able    Heme/DVT Prophylaxis:  #DVT prophylaxis: SCDs and Lovenox resumed. .  #Acute blood loss anemia: Received 1 unit PRBC 3/25. Monitor H/H. Slight downtrend will repeat CBC at 10 and consider transfusion.     ID:  #Prophylaxis: s/p Cefazolin perioperatively. No signs of infection.    Skin:  #Pressure ulcer prevention: Reposition frequently. Skin intact.    Disposition:  SICU, possible transfer to floor with hemodynamic stability

## 2025-03-27 NOTE — CHART NOTE - NSCHARTNOTEFT_GEN_A_CORE
SICU TRANSFER NOTE  -----------------------------  ICU Admission Date: 3/24  Transfer Date: 03-27-25 @ 09:05    Admission Diagnosis: scoliosis     Active Problems/injuries: acute blood loss anemia    Procedures:   3/24: anterior lumbar interbody fusion L4-5, L5-S1,lateral fusion L2-3. L3-4  3/26: posterior T6-pelvic fusion     Came to SICU for post-operative resuscitation. Soft BPs post-op, required further resuscitation with IVFs.     Consultants:  [x] Ortho       [ ] Weight Bearing Status: WBAT    Medications  acetaminophen     Tablet .. 975 milliGRAM(s) Oral every 8 hours  aluminum hydroxide/magnesium hydroxide/simethicone Suspension 30 milliLiter(s) Oral every 12 hours PRN  chlorhexidine 2% Cloths 1 Application(s) Topical daily  enoxaparin Injectable 40 milliGRAM(s) SubCutaneous every 24 hours  HYDROmorphone  Injectable 0.5 milliGRAM(s) IV Push every 4 hours PRN  HYDROmorphone PCA (1 mG/mL) 30 milliLiter(s) PCA Continuous PCA Continuous  influenza  Vaccine (HIGH DOSE) 0.5 milliLiter(s) IntraMuscular once  ketorolac   Injectable 30 milliGRAM(s) IV Push every 6 hours PRN  magnesium hydroxide Suspension 30 milliLiter(s) Oral every 12 hours PRN  melatonin 3 milliGRAM(s) Oral at bedtime PRN  methocarbamol 500 milliGRAM(s) Oral every 8 hours  ondansetron Injectable 4 milliGRAM(s) IV Push every 6 hours PRN  pantoprazole    Tablet 40 milliGRAM(s) Oral before breakfast  pregabalin 75 milliGRAM(s) Oral daily  senna 2 Tablet(s) Oral at bedtime      [x] I attest I have reviewed and reconciled all medications prior to transfer    IV Fluids  lactated ringers.: Solution, 1000 milliLiter(s) infuse at 75 mL/Hr  Special Instructions: CONVERT TO HEPLOCK POST OP DAY 1 ON 3/27/25 AT 1300  Provider's Contact #: [phone]  lactated ringers Bolus:   500 milliLiter(s), IV Bolus, once, infuse over 60 Minute(s), Stop After 1 Doses  lactated ringers Bolus:   500 milliLiter(s), IV Bolus, once, infuse over 60 Minute(s), Stop After 1 Doses  lactated ringers Bolus:   500 milliLiter(s), IV Bolus, once, infuse over 1 Hr, Stop After 1 Doses  Provider's Contact #: [phone]  dextrose 5% + sodium chloride 0.45%.: Solution, 1000 milliLiter(s) infuse at 75 mL/Hr  Special Instructions: while NPO  Provider's Contact #: [phone]  lactated ringers.: Solution, 1000 milliLiter(s) infuse at 75 mL/Hr  lactated ringers.: Solution, 1000 milliLiter(s) infuse at 75 mL/Hr  Special Instructions: convert to heplock post op day 1 on 3/25/25 at 1300  Provider's Contact #: [phone]    Indication: post-op resuscitation     Antibiotics: complete course of paulina op antibiotics       I have discussed this case with ortho team upon transfer and all questions regarding ICU course were answered.  The following items are to be followed up:    - monitor SHEILA drain output - subfacial 900cc yesterday of sanguinous then serosanguinous output   - 10 AM CBC for monitor H/H, transfuse to maintain Hg >7  - resumed home antihypertensives today with hold parameters   - MM pain control, on PCA as per ortho   - DVT ppx with lovenox  - PT/OT, encourage OOBTC, encourage IS   - AM labs to monitor H/H and lytes, replete as needed   - DC IVF today - got 500 cc albumin last night and 2L yesterday day to complete post-op resuscitation   - advanced to regular diet   - bowel regimen while on narcotics

## 2025-03-27 NOTE — OCCUPATIONAL THERAPY INITIAL EVALUATION ADULT - DIAGNOSIS, OT EVAL
s/p ALIF on 3/24/25; Fusion of thoracic spine through pelvis with osteotomy L2-L4 and T9; T6 pelvis instrumentation, PSF, L3-S1 laminectomy, facetectomy

## 2025-03-27 NOTE — PROGRESS NOTE ADULT - NS ATTEND AMEND GEN_ALL_CORE FT
looks good , abdomen soft , hgb slight drop post op but stable x 2 , will continue to follow
pt in OR early am , will continue to follow
abdomen slightly distended , but otherwise looks , good , incision clean , can get OOB , ambulate f/u in 2 weeks

## 2025-03-27 NOTE — OCCUPATIONAL THERAPY INITIAL EVALUATION ADULT - LIVES WITH, PROFILE
[place] with 2 steps to enter with railing and flight down to basement with railing/spouse

## 2025-03-28 LAB
ANION GAP SERPL CALC-SCNC: 10 MMOL/L — SIGNIFICANT CHANGE UP (ref 5–17)
BASOPHILS # BLD AUTO: 0.02 K/UL — SIGNIFICANT CHANGE UP (ref 0–0.2)
BASOPHILS NFR BLD AUTO: 0.2 % — SIGNIFICANT CHANGE UP (ref 0–2)
BUN SERPL-MCNC: 16.6 MG/DL — SIGNIFICANT CHANGE UP (ref 8–20)
CALCIUM SERPL-MCNC: 7.6 MG/DL — LOW (ref 8.4–10.5)
CHLORIDE SERPL-SCNC: 101 MMOL/L — SIGNIFICANT CHANGE UP (ref 96–108)
CO2 SERPL-SCNC: 25 MMOL/L — SIGNIFICANT CHANGE UP (ref 22–29)
CREAT SERPL-MCNC: 0.35 MG/DL — LOW (ref 0.5–1.3)
EGFR: 113 ML/MIN/1.73M2 — SIGNIFICANT CHANGE UP
EGFR: 113 ML/MIN/1.73M2 — SIGNIFICANT CHANGE UP
EOSINOPHIL # BLD AUTO: 0.05 K/UL — SIGNIFICANT CHANGE UP (ref 0–0.5)
EOSINOPHIL NFR BLD AUTO: 0.5 % — SIGNIFICANT CHANGE UP (ref 0–6)
GLUCOSE SERPL-MCNC: 109 MG/DL — HIGH (ref 70–99)
HCT VFR BLD CALC: 25.9 % — LOW (ref 34.5–45)
HGB BLD-MCNC: 8.9 G/DL — LOW (ref 11.5–15.5)
IMM GRANULOCYTES # BLD AUTO: 0.14 K/UL — HIGH (ref 0–0.07)
IMM GRANULOCYTES NFR BLD AUTO: 1.5 % — HIGH (ref 0–0.9)
LYMPHOCYTES # BLD AUTO: 0.86 K/UL — LOW (ref 1–3.3)
LYMPHOCYTES NFR BLD AUTO: 9.1 % — LOW (ref 13–44)
MAGNESIUM SERPL-MCNC: 2 MG/DL — SIGNIFICANT CHANGE UP (ref 1.6–2.6)
MCHC RBC-ENTMCNC: 29.7 PG — SIGNIFICANT CHANGE UP (ref 27–34)
MCHC RBC-ENTMCNC: 34.4 G/DL — SIGNIFICANT CHANGE UP (ref 32–36)
MCV RBC AUTO: 86.3 FL — SIGNIFICANT CHANGE UP (ref 80–100)
MONOCYTES # BLD AUTO: 0.85 K/UL — SIGNIFICANT CHANGE UP (ref 0–0.9)
MONOCYTES NFR BLD AUTO: 9 % — SIGNIFICANT CHANGE UP (ref 2–14)
NEUTROPHILS # BLD AUTO: 7.51 K/UL — HIGH (ref 1.8–7.4)
NEUTROPHILS NFR BLD AUTO: 79.7 % — HIGH (ref 43–77)
NRBC # BLD AUTO: 0.03 K/UL — HIGH (ref 0–0)
NRBC # FLD: 0.03 K/UL — HIGH (ref 0–0)
NRBC BLD AUTO-RTO: 0 /100 WBCS — SIGNIFICANT CHANGE UP (ref 0–0)
PHOSPHATE SERPL-MCNC: 1.8 MG/DL — LOW (ref 2.4–4.7)
PLATELET # BLD AUTO: 200 K/UL — SIGNIFICANT CHANGE UP (ref 150–400)
PMV BLD: 9.4 FL — SIGNIFICANT CHANGE UP (ref 7–13)
POTASSIUM SERPL-MCNC: 3.8 MMOL/L — SIGNIFICANT CHANGE UP (ref 3.5–5.3)
POTASSIUM SERPL-SCNC: 3.8 MMOL/L — SIGNIFICANT CHANGE UP (ref 3.5–5.3)
RBC # BLD: 3 M/UL — LOW (ref 3.8–5.2)
RBC # FLD: 15.4 % — HIGH (ref 10.3–14.5)
SODIUM SERPL-SCNC: 136 MMOL/L — SIGNIFICANT CHANGE UP (ref 135–145)
WBC # BLD: 9.43 K/UL — SIGNIFICANT CHANGE UP (ref 3.8–10.5)
WBC # FLD AUTO: 9.43 K/UL — SIGNIFICANT CHANGE UP (ref 3.8–10.5)

## 2025-03-28 PROCEDURE — 99232 SBSQ HOSP IP/OBS MODERATE 35: CPT

## 2025-03-28 PROCEDURE — 99223 1ST HOSP IP/OBS HIGH 75: CPT

## 2025-03-28 RX ADMIN — Medication 2 MILLIGRAM(S): at 11:38

## 2025-03-28 RX ADMIN — Medication 975 MILLIGRAM(S): at 21:17

## 2025-03-28 RX ADMIN — Medication 975 MILLIGRAM(S): at 14:20

## 2025-03-28 RX ADMIN — Medication 975 MILLIGRAM(S): at 05:30

## 2025-03-28 RX ADMIN — Medication 1 APPLICATION(S): at 11:39

## 2025-03-28 RX ADMIN — Medication 975 MILLIGRAM(S): at 06:34

## 2025-03-28 RX ADMIN — Medication 975 MILLIGRAM(S): at 22:15

## 2025-03-28 RX ADMIN — METHOCARBAMOL 500 MILLIGRAM(S): 500 TABLET, FILM COATED ORAL at 05:30

## 2025-03-28 RX ADMIN — Medication 2 MILLIGRAM(S): at 04:50

## 2025-03-28 RX ADMIN — Medication 2 MILLIGRAM(S): at 05:50

## 2025-03-28 RX ADMIN — MAGNESIUM HYDROXIDE 30 MILLILITER(S): 400 SUSPENSION ORAL at 04:49

## 2025-03-28 RX ADMIN — Medication 40 MILLIGRAM(S): at 05:30

## 2025-03-28 RX ADMIN — METHOCARBAMOL 500 MILLIGRAM(S): 500 TABLET, FILM COATED ORAL at 13:24

## 2025-03-28 RX ADMIN — POLYETHYLENE GLYCOL 3350 17 GRAM(S): 17 POWDER, FOR SOLUTION ORAL at 21:18

## 2025-03-28 RX ADMIN — METHOCARBAMOL 500 MILLIGRAM(S): 500 TABLET, FILM COATED ORAL at 21:17

## 2025-03-28 RX ADMIN — Medication 975 MILLIGRAM(S): at 13:24

## 2025-03-28 RX ADMIN — Medication 2 MILLIGRAM(S): at 12:30

## 2025-03-28 RX ADMIN — Medication 2 TABLET(S): at 21:17

## 2025-03-28 RX ADMIN — PREGABALIN 75 MILLIGRAM(S): 75 CAPSULE ORAL at 11:38

## 2025-03-28 NOTE — PROGRESS NOTE ADULT - ASSESSMENT
63 y/o female with Hx of HTN, Duodenal spasm, osteoporosis, Scoliosis, s/p b/l shoulder, s/p lumpectomy, she has complex adult deformity r/t scoliosis which she has undergone many years of conservative care including injections physical therapy core strengthening home exercise excetra. Based upon progressive pain progressive lumbar spondylosis progressive deformity patient wishes to consider surgical management in order to help her current pain levels deformity and progression of her deformity into the future, her symptoms started  year(s) ago, came in here for elective anterior lumbar interbody fusion L4-L5, L5-S1 Lateral Fusion L2-L3, L3-L4 on 3/24/2025 AND Posterior instrumented FusionT9 Pelvis, Possible T4-Pelvis on 3/26/25 with Dr. Cao s/p procedure, she was monitored in ICU post operatively, during the course she has hypocalcemia, her calcium was replaced, require pressors over the course of ICU, now has been weaned off, she also require 1 unit of PRBCs as well for acute blood loss anemia, patient has been weaned off from the Pressors, on PCA pump for the pain, Medicine consulted for Medical Management.        Plan:     Scoliosis s/p  anterior lumbar interbody fusion L4-L5, L5-S1 Lateral Fusion L2-L3, L3-L4 on 3/24/2025 AND Posterior instrumented FusionT9 Pelvis, Possible T4-Pelvis on 3/26/25      - VS stable  - abx per ortho   - c/w anti hypertensive to be restarted post op day 02 except if blood pressure goes >150 systolic   - opiate induced constipation regimen   - encouraging incentive spirometry   -c/w local wound care per ortho   -DVT prophylaxis and Pain meds as per Ortho team   -PT/OT and weight bearing per ortho   -cyclobenzaprine 10 mg once a day  -pregabalin 75 mg once a day  -drain care as per Primary team    Acute blood loss anemia due to procedure: Iron supplement, will monitor CBC, if trends further down, will transfuse    Hx of HTN: will continue with valsartan-hydrochlorothiazide 80 mg-12.5 mg once a day (at bedtime) with holding parameters     Osteoporosis: On Boniva 150 mg every 4 weeks

## 2025-03-28 NOTE — PROGRESS NOTE ADULT - SUBJECTIVE AND OBJECTIVE BOX
Interval Hx:  Patient seen during rounds  Patient reports pain to be controlled on current medications  Patient denies sedation with medications      Analgesic Dosing for past 24 hours reviewed as below:    acetaminophen     Tablet ..   975 milliGRAM(s) Oral (03-28-25 @ 13:24)   975 milliGRAM(s) Oral (03-28-25 @ 05:30)   975 milliGRAM(s) Oral (03-27-25 @ 21:05)    HYDROmorphone   Tablet   2 milliGRAM(s) Oral (03-28-25 @ 11:38)   2 milliGRAM(s) Oral (03-28-25 @ 04:50)   2 milliGRAM(s) Oral (03-27-25 @ 17:23)    HYDROmorphone  Injectable   0.5 milliGRAM(s) IV Push (03-27-25 @ 20:36)    melatonin   3 milliGRAM(s) Oral (03-27-25 @ 21:05)    methocarbamol   500 milliGRAM(s) Oral (03-28-25 @ 13:24)   500 milliGRAM(s) Oral (03-28-25 @ 05:30)   500 milliGRAM(s) Oral (03-27-25 @ 21:05)    pregabalin   75 milliGRAM(s) Oral (03-28-25 @ 11:38)          T(C): 36.7 (03-28-25 @ 12:00), Max: 37.2 (03-27-25 @ 22:28)  HR: 82 (03-28-25 @ 12:00) (82 - 104)  BP: 123/83 (03-28-25 @ 12:00) (98/69 - 148/94)  RR: 18 (03-28-25 @ 12:00) (17 - 31)  SpO2: 98% (03-28-25 @ 12:00) (90% - 100%)      03-27-25 @ 07:01  -  03-28-25 @ 07:00  --------------------------------------------------------  IN: 1473 mL / OUT: 705 mL / NET: 768 mL    03-28-25 @ 07:01  -  03-28-25 @ 14:57  --------------------------------------------------------  IN: 0 mL / OUT: 320 mL / NET: -320 mL        acetaminophen     Tablet .. 975 milliGRAM(s) Oral every 8 hours  aluminum hydroxide/magnesium hydroxide/simethicone Suspension 30 milliLiter(s) Oral every 12 hours PRN  chlorhexidine 2% Cloths 1 Application(s) Topical daily  HYDROmorphone   Tablet 1 milliGRAM(s) Oral every 4 hours PRN  HYDROmorphone   Tablet 2 milliGRAM(s) Oral every 4 hours PRN  HYDROmorphone  Injectable 0.5 milliGRAM(s) IV Push every 4 hours PRN  influenza  Vaccine (HIGH DOSE) 0.5 milliLiter(s) IntraMuscular once  magnesium hydroxide Suspension 30 milliLiter(s) Oral every 12 hours PRN  melatonin 3 milliGRAM(s) Oral at bedtime PRN  methocarbamol 500 milliGRAM(s) Oral every 8 hours  ondansetron Injectable 4 milliGRAM(s) IV Push every 6 hours PRN  pantoprazole    Tablet 40 milliGRAM(s) Oral before breakfast  polyethylene glycol 3350 17 Gram(s) Oral every 24 hours  pregabalin 75 milliGRAM(s) Oral daily  senna 2 Tablet(s) Oral at bedtime                          8.9    9.43  )-----------( 200      ( 28 Mar 2025 04:35 )             25.9     03-28    136  |  101  |  16.6  ----------------------------<  109[H]  3.8   |  25.0  |  0.35[L]    Ca    7.6[L]      28 Mar 2025 04:35  Phos  1.8     03-28  Mg     2.0     03-28      PT/INR - ( 27 Mar 2025 17:10 )   PT: 10.4 sec;   INR: 0.90 ratio         PTT - ( 27 Mar 2025 17:10 )  PTT:22.8 sec  Urinalysis Basic - ( 28 Mar 2025 04:35 )    Color: x / Appearance: x / SG: x / pH: x  Gluc: 109 mg/dL / Ketone: x  / Bili: x / Urobili: x   Blood: x / Protein: x / Nitrite: x   Leuk Esterase: x / RBC: x / WBC x   Sq Epi: x / Non Sq Epi: x / Bacteria: x        Pain Service   [phone]

## 2025-03-28 NOTE — PROGRESS NOTE ADULT - SUBJECTIVE AND OBJECTIVE BOX
KIM ROLON    [id number]    64y      Female    Patient is a 64y old  Female who presents with a chief complaint of Scoliosis     (27 Mar 2025 09:24)      INTERVAL HPI/OVERNIGHT EVENTS:    Patient is feeling tired, denies chest pain, sob, dizziness, her pain is well controlled       Vital Signs Last 24 Hrs  T(C): 36.8 (28 Mar 2025 09:56), Max: 37.2 (27 Mar 2025 22:28)  T(F): 98.3 (28 Mar 2025 09:56), Max: 99 (28 Mar 2025 05:00)  HR: 92 (28 Mar 2025 09:56) (88 - 104)  BP: 144/91 (28 Mar 2025 09:56) (98/69 - 148/94)  BP(mean): 91 (27 Mar 2025 22:00) (77 - 103)  RR: 18 (28 Mar 2025 09:56) (17 - 31)  SpO2: 95% (28 Mar 2025 09:56) (90% - 100%)    Parameters below as of 28 Mar 2025 09:56  Patient On (Oxygen Delivery Method): nasal cannula  O2 Flow (L/min): 2      PHYSICAL EXAM:    GENERAL: Middle age female looking comfortable    HEENT: PERRL, +EOMI  NECK: soft, Supple, No JVD  CHEST/LUNG: Decrease air entry bilaterally; No wheezing  HEART: S1S2+, Regular rate and rhythm; No murmurs  ABDOMEN: Soft, Nontender, Nondistended; Bowel sounds present  EXTREMITIES:  1+ Peripheral Pulses, No edema  SKIN: No rashes or lesions  NEURO: AAOX3  PSYCH: normal mood  Back: clean dressings on, drain in place       LABS:                        8.9    9.43  )-----------( 200      ( 28 Mar 2025 04:35 )             25.9     03-28    136  |  101  |  16.6  ----------------------------<  109[H]  3.8   |  25.0  |  0.35[L]    Ca    7.6[L]      28 Mar 2025 04:35  Phos  1.8     03-28  Mg     2.0     03-28      PT/INR - ( 27 Mar 2025 17:10 )   PT: 10.4 sec;   INR: 0.90 ratio         PTT - ( 27 Mar 2025 17:10 )  PTT:22.8 sec      I&O's Summary    27 Mar 2025 07:01  -  28 Mar 2025 07:00  --------------------------------------------------------  IN: 1473 mL / OUT: 705 mL / NET: 768 mL    28 Mar 2025 07:01  -  28 Mar 2025 12:29  --------------------------------------------------------  IN: 0 mL / OUT: 320 mL / NET: -320 mL        MEDICATIONS  (STANDING):  acetaminophen     Tablet .. 975 milliGRAM(s) Oral every 8 hours  chlorhexidine 2% Cloths 1 Application(s) Topical daily  influenza  Vaccine (HIGH DOSE) 0.5 milliLiter(s) IntraMuscular once  methocarbamol 500 milliGRAM(s) Oral every 8 hours  pantoprazole    Tablet 40 milliGRAM(s) Oral before breakfast  polyethylene glycol 3350 17 Gram(s) Oral every 24 hours  pregabalin 75 milliGRAM(s) Oral daily  senna 2 Tablet(s) Oral at bedtime    MEDICATIONS  (PRN):  aluminum hydroxide/magnesium hydroxide/simethicone Suspension 30 milliLiter(s) Oral every 12 hours PRN Indigestion  HYDROmorphone   Tablet 1 milliGRAM(s) Oral every 4 hours PRN Moderate Pain (4 - 6)  HYDROmorphone   Tablet 2 milliGRAM(s) Oral every 4 hours PRN Severe Pain (7 - 10)  HYDROmorphone  Injectable 0.5 milliGRAM(s) IV Push every 4 hours PRN breakthrough  magnesium hydroxide Suspension 30 milliLiter(s) Oral every 12 hours PRN Constipation  melatonin 3 milliGRAM(s) Oral at bedtime PRN Insomnia  ondansetron Injectable 4 milliGRAM(s) IV Push every 6 hours PRN Nausea and/or Vomiting

## 2025-03-28 NOTE — CONSULT NOTE ADULT - SUBJECTIVE AND OBJECTIVE BOX
64yF was admitted on 03-24 with history of progressive scoliosis for elective anterior lumbar interbody fusion L4-L5, L5-S1 Lateral Fusion L2-L3, L3-L4 on 3/24/2025 AND Posterior instrumented FusionT9 Pelvis, Possible T4-Pelvis on 3/26/25 with Dr. Cao.  Post-op complications included hypotension, hypovolemic shock, and anemia.         Imaging Reviewed Today:  CT T & L SPINE 11/2024 - 1.  At L4-L5 degenerative grade 1 left anterolateral listhesis and advanced facet arthrosis contribute to likely moderate spinal canal stenosis and right worse than left lateral recess stenosis. Correlate for possible right-sided L5 radiculopathy. 2.  Background moderate S-shaped thoracic scoliosis and moderate lumbar levoscoliosis.    ----------------------------  Patient is quite fatigued.  Having back pain.   States she does not want to go to rehab.      VITALS  T(C): 37.2 (03-28-25 @ 05:00), Max: 37.2 (03-27-25 @ 22:28)  HR: 97 (03-28-25 @ 05:00) (88 - 104)  BP: 148/94 (03-28-25 @ 05:00) (89/60 - 148/94)  RR: 18 (03-28-25 @ 05:00) (17 - 31)  SpO2: 92% (03-28-25 @ 05:00) (90% - 100%)  Wt(kg): --    PAST MEDICAL & SURGICAL HISTORY  H/O osteoporosis    HTN (hypertension)    Scoliosis    Lumbar spondylosis    H/O shoulder surgery    H/O lumpectomy         RECENT LABS REVIEWED    CBC Full  -  ( 28 Mar 2025 04:35 )  WBC Count : 9.43 K/uL  RBC Count : 3.00 M/uL  Hemoglobin : 8.9 g/dL  Hematocrit : 25.9 %  Platelet Count - Automated : 200 K/uL  Mean Cell Volume : 86.3 fl  Mean Cell Hemoglobin : 29.7 pg  Mean Cell Hemoglobin Concentration : 34.4 g/dL  Auto Neutrophil # : 7.51 K/uL  Auto Lymphocyte # : 0.86 K/uL  Auto Monocyte # : 0.85 K/uL  Auto Eosinophil # : 0.05 K/uL  Auto Basophil # : 0.02 K/uL  Auto Neutrophil % : 79.7 %  Auto Lymphocyte % : 9.1 %  Auto Monocyte % : 9.0 %  Auto Eosinophil % : 0.5 %  Auto Basophil % : 0.2 %    03-28    136  |  101  |  16.6  ----------------------------<  109[H]  3.8   |  25.0  |  0.35[L]    Ca    7.6[L]      28 Mar 2025 04:35  Phos  1.8     03-28  Mg     2.0     03-28      Urinalysis Basic - ( 28 Mar 2025 04:35 )    Color: x / Appearance: x / SG: x / pH: x  Gluc: 109 mg/dL / Ketone: x  / Bili: x / Urobili: x   Blood: x / Protein: x / Nitrite: x   Leuk Esterase: x / RBC: x / WBC x   Sq Epi: x / Non Sq Epi: x / Bacteria: x        ALLERGIES  gabapentin (Rash)  sulfa drugs (Other)      MEDICATIONS   acetaminophen     Tablet .. 975 milliGRAM(s) Oral every 8 hours  aluminum hydroxide/magnesium hydroxide/simethicone Suspension 30 milliLiter(s) Oral every 12 hours PRN  chlorhexidine 2% Cloths 1 Application(s) Topical daily  HYDROmorphone   Tablet 1 milliGRAM(s) Oral every 4 hours PRN  HYDROmorphone   Tablet 2 milliGRAM(s) Oral every 4 hours PRN  HYDROmorphone  Injectable 0.5 milliGRAM(s) IV Push every 4 hours PRN  influenza  Vaccine (HIGH DOSE) 0.5 milliLiter(s) IntraMuscular once  magnesium hydroxide Suspension 30 milliLiter(s) Oral every 12 hours PRN  melatonin 3 milliGRAM(s) Oral at bedtime PRN  methocarbamol 500 milliGRAM(s) Oral every 8 hours  ondansetron Injectable 4 milliGRAM(s) IV Push every 6 hours PRN  pantoprazole    Tablet 40 milliGRAM(s) Oral before breakfast  polyethylene glycol 3350 17 Gram(s) Oral every 24 hours  pregabalin 75 milliGRAM(s) Oral daily  senna 2 Tablet(s) Oral at bedtime      ----------------------------------------------------------------------------------------  FUNCTIONAL HISTORY  Lives with spouse, 2 ANTON[address]  Independent    FUNCTIONAL STATUS/PROGRESS  3/27 PT  Sit-Stand Transfer Training  Transfer Training Sit-to-Stand Transfer: minimum assist (75% patient effort);  2 person assist;  weight-bearing as tolerated   rolling walker  Transfer Training Stand-to-Sit Transfer: dependent (less than 25% patient effort);  2 person assist;   lift to EOB, SEE POC  Sit-to-Stand Transfer Training Transfer Safety Analysis: decreased weight-shifting ability;  decreased balance;  impaired balance;  decreased strength;  pain;  rolling walker    Gait Training  Gait Training: minimum assist (75% patient effort);  2 person assist;  weight-bearing as tolerated   rolling walker;  3 feet chair to bed   Gait Analysis: 2-point gait   decreased rosita;  shuffling;  decreased step length;  impaired balance;  decreased strength;  pain;  BLE buckle, See POC;  rolling walker    3/27 OT  Bathing Training:     · Level of [place]	moderate assist (50% patients effort)  · Physical Assist/Nonphysical Assist	1 person assist    Upper Body Dressing Training:     · Level of [place]	moderate assist (50% patients effort); to don gown  · Physical Assist/Nonphysical Assist	1 person assist    Lower Body Dressing Training:     · Level of [place]	maximum assist (25% patients effort); to don socks  · Physical Assist/Nonphysical Assist	1 person assist    Toilet Hygiene Training:     · Level of [place]	dependent (less than 25% patients effort); secondary to catheter alegria    Grooming Training:     · Level of [place]	minimum assist (75% patients effort)  · Physical Assist/Nonphysical Assist	1 person assist    Eating/Self-Feeding Training:     · Level of [place]	independent      ----------------------------------------------------------------------------------------  PHYSICAL EXAM  Constitutional - NAD, Appears Uncomfortable  HEENT - NCAT, EOMI  Neck - Supple, No limited ROM  Chest - Breathing comfortably, No wheezing  Cardiovascular - S1S2   Abdomen - Soft   Extremities - BUE swollen  Neurologic Exam -                    Cognitive - AAO to self, place, date, year, situation     FUNCTIONAL MOTOR EXAM - Limited by fatigue and pain                    LEFT    UE - ShAB 3/5, EF 3/5, EE 3/5,  2/5                    RIGHT UE - ShAB 3/5, EF 3/5, EE 3/5,  3/5                    LEFT    LE - HF 3/5, KE 2/5, DF 5/5                     RIGHT LE - HF 2/5, KE 2/5, DF 5/5      Sensory - Intact to LT  Psychiatric - Fatigued  ----------------------------------------------------------------------------------------  ASSESSMENT/PLAN  64yFemale with functional deficits after two staged spinal surgery for scoliosis   Scoliosis s/p anterior and lateral fusion and posterior T4-pelvis - WBAT  Pain - Tylenol, Dilaudid, Lyrica, Robaxin  Sleep - Melatonin   DVT PPX - SCDs  Rehab/Impaired mobility and function - Patient continues to require hospitalization for the above diagnoses and ongoing active management of comorbid complications that are substantially posing a threat to bodily function, functional ability and quality of life.     RECOMMEND - OOB daily, Turn Q2 when needed, HOB >30 degrees    When medically optimized, based on the patient's diagnosis, current functional status and potential for progress, recommend ACUTE inpatient rehabilitation for the functional deficits consisting of 3 hours of therapy/day & 24 hour RN/daily PMR physician for active comorbid medical management. Patient will be able to tolerate 3 hours a day.    Despite recommendations, and educating patient, wants home. Will consider option based on her progress and spouse to assist. 
CC: post op pain    HPI: per chart review:  69-year-old female with a PMH of HTN, osteoporosis, and scoliosis, and admitted for surgical correction of progressive adult spinal deformity. Underwent anterior lumbar interbody fusion L4-L5, L5-S1, and lateral fusion L2-L3, L3-L4 on 3/24/25. Planned for posterior instrumented fusion T9-pelvis (possible T4-pelvis) on 3/26/2 with postoperative hypotension and hypovolemic shock post-op.    3/25: Received 1 unit PRBC and 2L IVF post-op for hypotension. Started on norepinephrine briefly, now off.  1L + 500cc albumin     Interval Hx:  Patient seen during rounds  Patient reports pain to be controlled on current medications  Patient denies sedation with medications      Analgesic Dosing for past 24 hours reviewed as below:    acetaminophen     Tablet ..   975 milliGRAM(s) Oral (03-27-25 @ 06:11)   975 milliGRAM(s) Oral (03-26-25 @ 22:11)    methocarbamol   500 milliGRAM(s) Oral (03-27-25 @ 06:11)   500 milliGRAM(s) Oral (03-26-25 @ 22:11)    pregabalin   75 milliGRAM(s) Oral (03-27-25 @ 11:53)   75 milliGRAM(s) Oral (03-26-25 @ 22:11)          T(C): 36.7 (03-27-25 @ 08:51), Max: 37.2 (03-27-25 @ 04:00)  HR: 102 (03-27-25 @ 09:00) (72 - 102)  BP: 114/71 (03-27-25 @ 09:00) (84/62 - 114/71)  RR: 22 (03-27-25 @ 09:00) (10 - 26)  SpO2: 92% (03-27-25 @ 09:00) (88% - 100%)      03-26-25 @ 07:01  -  03-27-25 @ 07:00  --------------------------------------------------------  IN: 3930 mL / OUT: 2050 mL / NET: 1880 mL    03-27-25 @ 07:01  -  03-27-25 @ 12:50  --------------------------------------------------------  IN: 150 mL / OUT: 290 mL / NET: -140 mL        acetaminophen     Tablet .. 975 milliGRAM(s) Oral every 8 hours  aluminum hydroxide/magnesium hydroxide/simethicone Suspension 30 milliLiter(s) Oral every 12 hours PRN  chlorhexidine 2% Cloths 1 Application(s) Topical daily  enoxaparin Injectable 40 milliGRAM(s) SubCutaneous every 24 hours  HYDROmorphone   Tablet 1 milliGRAM(s) Oral every 4 hours PRN  HYDROmorphone   Tablet 2 milliGRAM(s) Oral every 4 hours PRN  influenza  Vaccine (HIGH DOSE) 0.5 milliLiter(s) IntraMuscular once  ketorolac   Injectable 30 milliGRAM(s) IV Push every 6 hours PRN  magnesium hydroxide Suspension 30 milliLiter(s) Oral every 12 hours PRN  melatonin 3 milliGRAM(s) Oral at bedtime PRN  methocarbamol 500 milliGRAM(s) Oral every 8 hours  ondansetron Injectable 4 milliGRAM(s) IV Push every 6 hours PRN  pantoprazole    Tablet 40 milliGRAM(s) Oral before breakfast  pregabalin 75 milliGRAM(s) Oral daily  senna 2 Tablet(s) Oral at bedtime  valsartan 80 milliGRAM(s) Oral daily                          7.1    8.57  )-----------( 159      ( 27 Mar 2025 10:17 )             20.6     03-27    136  |  101  |  12.7  ----------------------------<  114[H]  4.9   |  24.0  |  0.43[L]    Ca    7.2[L]      27 Mar 2025 03:30  Phos  2.9     03-27  Mg     1.9     03-27      PT/INR - ( 26 Mar 2025 14:22 )   PT: 10.9 sec;   INR: 0.96 ratio         PTT - ( 26 Mar 2025 14:22 )  PTT:18.3 sec  Urinalysis Basic - ( 27 Mar 2025 03:30 )    Color: x / Appearance: x / SG: x / pH: x  Gluc: 114 mg/dL / Ketone: x  / Bili: x / Urobili: x   Blood: x / Protein: x / Nitrite: x   Leuk Esterase: x / RBC: x / WBC x   Sq Epi: x / Non Sq Epi: x / Bacteria: x        Pain Service   [phone]
63 y/o female with Hx of HTN, Duodenal spasm, osteoporosis, Scoliosis, s/p b/l shoulder, s/p lumpectomy, she has complex adult deformity r/t scoliosis which she has undergone many years of conservative care including injections physical therapy core strengthening home exercise excetra. Based upon progressive pain progressive lumbar spondylosis progressive deformity patient wishes to consider surgical management in order to help her current pain levels deformity and progression of her deformity into the future, her symptoms started  year(s) ago, came in here for elective anterior lumbar interbody fusion L4-L5, L5-S1 Lateral Fusion L2-L3, L3-L4 on 3/24/2025 AND Posterior instrumented FusionT9 Pelvis, Possible T4-Pelvis on 3/26/25 with Dr. Cao s/p procedure, she was monitored in ICU post operatively, during the course she has hypocalcemia, her calcium was replaced, require pressors over the course of ICU, now has been weaned off, she also require 1 unit of PRBCs as well for acute blood loss anemia, patient has been weaned off from the Pressors, on PCA pump for the pain, Medicine consulted for Medical Management.  her pain is well managed with PCA, denies sob, or chest pain, has no fever, chills, chest pain, sob.        Allergies:  	gabapentin: Drug, Rash, skin peeling  	sulfa drugs: Drug Category, Other, Originally Entered as [Other allergic reaction: HAD REACTION AS CHILD, DOESN'T KNOW REACTION TYPE, MOTHER TOLD HER NEVER TO TAKE] reaction to [Sulfa (Sulfonamide Antibiotics)]      PAST MEDICAL HISTORY:  H/O osteoporosis     HTN (hypertension)     Lumbar spondylosis     Scoliosis.     PAST SURGICAL HISTORY:  H/O lumpectomy     H/O shoulder surgery.     FAMILY HISTORY:  Father  Still living? Unknown  FH: heart disease, Age at diagnosis: Age Unknown  FH: lung cancer, Age at diagnosis: Age Unknown    Mother  Still living? Unknown  FH: breast cancer, Age at diagnosis: Age Unknown  FH: heart disease, Age at diagnosis: Age Unknown.      Social History:   Not a smoker, drinker or using any drugs     Home Medications:   * Patient Currently Takes Medications as of 07-Mar-2025 17:20 documented in Structured Notes  · 	cyclobenzaprine 10 mg oral tablet: Last Dose Taken:  , 1 tab(s) orally once a day  · 	valsartan-hydrochlorothiazide 80 mg-12.5 mg oral tablet: 1 tab(s) orally once a day (at bedtime)  · 	Boniva 150 mg oral tablet: Last Dose Taken:  , 1 tab(s) orally every 4 weeks  · 	pregabalin 75 mg oral capsule: 1 cap(s) orally once a day  · 	fish oil once daily: Last Dose Taken:    · 	vitamin C once daily 1000 mg daily:   · 	citracal slow release 1200 mg once: Last Dose Taken:    · 	arthritis tylenol 2 3x per daily: Last Dose Taken:          Vital Signs Last 24 Hrs  T(C): 36.7 (27 Mar 2025 08:51), Max: 37.2 (27 Mar 2025 04:00)  T(F): 98.1 (27 Mar 2025 08:51), Max: 98.9 (27 Mar 2025 04:00)  HR: 102 (27 Mar 2025 09:00) (72 - 102)  BP: 114/71 (27 Mar 2025 09:00) (84/62 - 114/71)  BP(mean): 82 (27 Mar 2025 09:00) (66 - 85)  RR: 22 (27 Mar 2025 09:00) (10 - 26)  SpO2: 92% (27 Mar 2025 09:00) (88% - 100%)    Parameters below as of 27 Mar 2025 09:00  Patient On (Oxygen Delivery Method): room air        PHYSICAL EXAM:    GENERAL: Middle age female looking comfortable    HEENT: PERRL, +EOMI  NECK: soft, Supple, No JVD  CHEST/LUNG: Decrease air entry bilaterally; No wheezing  HEART: S1S2+, Regular rate and rhythm; No murmurs  ABDOMEN: Soft, Nontender, Nondistended; Bowel sounds present  EXTREMITIES:  1+ Peripheral Pulses, No edema  SKIN: No rashes or lesions  NEURO: AAOX3  PSYCH: normal mood  Back: clean dressings on, drain in place       LABS:                        8.1    9.18  )-----------( 162      ( 27 Mar 2025 03:30 )             24.3     03-27    136  |  101  |  12.7  ----------------------------<  114[H]  4.9   |  24.0  |  0.43[L]    Ca    7.2[L]      27 Mar 2025 03:30  Phos  2.9     03-27  Mg     1.9     03-27      PT/INR - ( 26 Mar 2025 14:22 )   PT: 10.9 sec;   INR: 0.96 ratio         PTT - ( 26 Mar 2025 14:22 )  PTT:18.3 sec      I&O's Summary    26 Mar 2025 07:01  -  27 Mar 2025 07:00  --------------------------------------------------------  IN: 3930 mL / OUT: 2050 mL / NET: 1880 mL    27 Mar 2025 07:01  -  27 Mar 2025 10:24  --------------------------------------------------------  IN: 150 mL / OUT: 280 mL / NET: -130 mL        MEDICATIONS  (STANDING):  acetaminophen     Tablet .. 975 milliGRAM(s) Oral every 8 hours  chlorhexidine 2% Cloths 1 Application(s) Topical daily  enoxaparin Injectable 40 milliGRAM(s) SubCutaneous every 24 hours  HYDROmorphone PCA (1 mG/mL) 30 milliLiter(s) PCA Continuous PCA Continuous  influenza  Vaccine (HIGH DOSE) 0.5 milliLiter(s) IntraMuscular once  methocarbamol 500 milliGRAM(s) Oral every 8 hours  pantoprazole    Tablet 40 milliGRAM(s) Oral before breakfast  pregabalin 75 milliGRAM(s) Oral daily  senna 2 Tablet(s) Oral at bedtime  valsartan 80 milliGRAM(s) Oral daily    MEDICATIONS  (PRN):  aluminum hydroxide/magnesium hydroxide/simethicone Suspension 30 milliLiter(s) Oral every 12 hours PRN Indigestion  HYDROmorphone  Injectable 0.5 milliGRAM(s) IV Push every 4 hours PRN SEVERE BREAKTHROUGH PAIN NOT CONTROLLED BY CURRENT MEDS  ketorolac   Injectable 30 milliGRAM(s) IV Push every 6 hours PRN SEVERE BREAKTHROUGH PAIN NOT CONTROLLED BY CURRENT MEDS  magnesium hydroxide Suspension 30 milliLiter(s) Oral every 12 hours PRN Constipation  melatonin 3 milliGRAM(s) Oral at bedtime PRN Insomnia  ondansetron Injectable 4 milliGRAM(s) IV Push every 6 hours PRN Nausea and/or Vomiting        
SICU Consultation Note  =====================================================  HPI:  65 yo female presents for PST with PMH of HTN, Duodenal spasm, osteoporosis, Scoliosis, s/p b/l shoulder, s/p lumpectomy. Patient c/o a complex adult deformity r/t scoliosis which she has undergone many years of conservative care including injections physical therapy core strengthening home exercise excetra. Based upon progressive pain progressive lumbar spondylosis progressive deformity patient wishes to consider surgical management in order to help her current pain levels deformity and progression of her deformity into the future. She states the symptoms are worsening. The patient mentions the symptoms started  year(s) ago. Pain levels include a current pain level of 3/10, an average pain level of 2/10, a minimum pain level of 1/10 and a maximum pain level of 10/10. She states the symptoms seem to be constant. Modifying factors - worsened by bending and worsened by lifting. Relieving factors include relieved by exercise regimen and relieved by heat. No relieving factors are noted. Associated Symptoms: no ataxia,no incontinence,good dexterityandno urinary retention. Patient scheduled for anterior lumbar interbody fusion L4-L5, L5-S1 Lateral Fusion L2-L3, L3-L4 on 3/24/2025 AND Posterior instrumented FusionT9 Pelvis, Possible T4-Pelvis on 3/26/25 with Dr. Cao.    SICU consulted s/p anterior lumbar interbody fusion L4-L5, L5-S1 Lateral Fusion L2-L3, L3-L4 on 3/24/2025. Vascular consulted for RP access via Pfannenstiel incision. 100 EBL. SICU consulted for hypotension s/p vasopressors and 1U PRBC.         PAST MEDICAL & SURGICAL HISTORY:  H/O osteoporosis      HTN (hypertension)      Scoliosis      Lumbar spondylosis      H/O shoulder surgery      H/O lumpectomy        Home Meds:   Allergies: gabapentin (Rash)  sulfa drugs (Other)    Soc:   Advanced Directives: Presumed Full Code     CURRENT MEDICATIONS:   --------------------------------------------------------------------------------------  MEDICATIONS  (STANDING):  acetaminophen     Tablet .. 975 milliGRAM(s) Oral every 8 hours  ceFAZolin  Injectable. 2000 milliGRAM(s) IV Push <User Schedule>  chlorhexidine 2% Cloths 1 Application(s) Topical daily  dexAMETHasone  Injectable 4 milliGRAM(s) IV Push every 6 hours  HYDROmorphone PCA (1 mG/mL) 30 milliLiter(s) PCA Continuous PCA Continuous  lactated ringers. 1000 milliLiter(s) (75 mL/Hr) IV Continuous <Continuous>  lactated ringers. 1000 milliLiter(s) (75 mL/Hr) IV Continuous <Continuous>  methocarbamol 500 milliGRAM(s) Oral every 8 hours  pantoprazole    Tablet 40 milliGRAM(s) Oral before breakfast  phenylephrine    Infusion 0.5 MICROgram(s)/kG/Min (8.81 mL/Hr) IV Continuous <Continuous>  pregabalin 75 milliGRAM(s) Oral daily  senna 2 Tablet(s) Oral at bedtime    MEDICATIONS  (PRN):  aluminum hydroxide/magnesium hydroxide/simethicone Suspension 30 milliLiter(s) Oral every 12 hours PRN Indigestion  ketorolac   Injectable 30 milliGRAM(s) IV Push every 6 hours PRN pain not controlled by current meds  magnesium hydroxide Suspension 30 milliLiter(s) Oral every 12 hours PRN Constipation  melatonin 3 milliGRAM(s) Oral at bedtime PRN Insomnia  metoclopramide Injectable 10 milliGRAM(s) IV Push once PRN Nausea and/or Vomiting  ondansetron Injectable 4 milliGRAM(s) IV Push every 6 hours PRN Nausea and/or Vomiting  ondansetron Injectable 4 milliGRAM(s) IV Push every 6 hours PRN Nausea and/or Vomiting  ondansetron Injectable 4 milliGRAM(s) IV Push once PRN Nausea and/or Vomiting    --------------------------------------------------------------------------------------    VITAL SIGNS, INS/OUTS     ICU Vital Signs Last 24 Hrs  T(C): 36.8 (24 Mar 2025 21:15), Max: 36.9 (24 Mar 2025 20:00)  T(F): 98.2 (24 Mar 2025 21:15), Max: 98.5 (24 Mar 2025 20:00)  HR: 74 (24 Mar 2025 22:15) (72 - 88)  BP: 106/58 (24 Mar 2025 22:15) (77/65 - 142/91)  BP(mean): 79 (24 Mar 2025 20:00) (69 - 100)  ABP: 107/54 (24 Mar 2025 22:15) (87/47 - 119/72)  ABP(mean): 73 (24 Mar 2025 22:15) (62 - 85)  RR: 16 (24 Mar 2025 22:15) (12 - 20)  SpO2: 100% (24 Mar 2025 22:15) (100% - 100%)    O2 Parameters below as of 24 Mar 2025 22:15  Patient On (Oxygen Delivery Method): room air            I&O's Detail    24 Mar 2025 07:01  -  24 Mar 2025 23:20  --------------------------------------------------------  IN:    IV PiggyBack: 250 mL    Lactated Ringers: 150 mL    Phenylephrine: 8.8 mL  Total IN: 408.8 mL    OUT:    Indwelling Catheter - Urethral (mL): 125 mL  Total OUT: 125 mL    Total NET: 283.8 mL          ABG - ( 24 Mar 2025 16:04 )  pH, Arterial: 7.390 pH, Blood: x     /  pCO2: 39    /  pO2: 399   / HCO3: 24    / Base Excess: -1.4  /  SaO2: 99.7                --------------------------------------------------------------------------------------    Physical Exam:    Gen: Resting comfortably in bed    Neurological: Alert and oriented x3 without focal deficit    Pulmonary: CTA B/L,  equal rise and fall of the chest, no increased WOB    Cardiovascular: regular rate and rhyth,    Gastrointestinal: Soft, non-tender, non-distended, incisions CDI    : Riley in place draining clear yellow urine    LABS:  CBC Full  -  ( 24 Mar 2025 19:30 )  WBC Count : 11.29 K/uL  RBC Count : 3.08 M/uL  Hemoglobin : 9.6 g/dL  Hematocrit : 27.9 %  Platelet Count - Automated : 314 K/uL  Mean Cell Volume : 90.6 fl  Mean Cell Hemoglobin : 31.2 pg  Mean Cell Hemoglobin Concentration : 34.4 g/dL  Auto Neutrophil # : x  Auto Lymphocyte # : x  Auto Monocyte # : x  Auto Eosinophil # : x  Auto Basophil # : x  Auto Neutrophil % : x  Auto Lymphocyte % : x  Auto Monocyte % : x  Auto Eosinophil % : x  Auto Basophil % : x    03-24    136  |  102  |  11.5  ----------------------------<  152[H]  4.4   |  22.0  |  0.46[L]    Ca    8.2[L]      24 Mar 2025 19:30    TPro  4.8[L]  /  Alb  3.3  /  TBili  0.5  /  DBili  x   /  AST  29  /  ALT  15  /  AlkPhos  42  03-24      Urinalysis Basic - ( 24 Mar 2025 19:30 )    Color: x / Appearance: x / SG: x / pH: x  Gluc: 152 mg/dL / Ketone: x  / Bili: x / Urobili: x   Blood: x / Protein: x / Nitrite: x   Leuk Esterase: x / RBC: x / WBC x   Sq Epi: x / Non Sq Epi: x / Bacteria: x      RECENT CULTURES:      LIVER FUNCTIONS - ( 24 Mar 2025 19:30 )  Alb: 3.3 g/dL / Pro: 4.8 g/dL / ALK PHOS: 42 U/L / ALT: 15 U/L / AST: 29 U/L / GGT: x

## 2025-03-28 NOTE — PROGRESS NOTE ADULT - SUBJECTIVE AND OBJECTIVE BOX
KIM ROLON    [id number]    History: 64y Female is status post T6 to pelvis PSF, L2-4 lateral fusion, ALIF L4-S1 POD # 2.  Patient is doing well and is comfortable. The patient's pain is controlled using the prescribed pain medications. The patient is participating in physical therapy. Denies nausea, vomiting, chest pain, shortness of breath, abdominal pain or fever. No new complaints.    Vital Signs Last 24 Hrs  T(C): 36.8 (28 Mar 2025 09:56), Max: 37.2 (27 Mar 2025 22:28)  T(F): 98.3 (28 Mar 2025 09:56), Max: 99 (28 Mar 2025 05:00)  HR: 92 (28 Mar 2025 09:56) (88 - 104)  BP: 144/91 (28 Mar 2025 09:56) (89/60 - 148/94)  BP(mean): 91 (27 Mar 2025 22:00) (70 - 103)  RR: 18 (28 Mar 2025 09:56) (17 - 31)  SpO2: 95% (28 Mar 2025 09:56) (90% - 100%)    Parameters below as of 28 Mar 2025 09:56  Patient On (Oxygen Delivery Method): nasal cannula  O2 Flow (L/min): 2                            8.9    9.43  )-----------( 200      ( 28 Mar 2025 04:35 )             25.9     03-28    136  |  101  |  16.6  ----------------------------<  109[H]  3.8   |  25.0  |  0.35[L]    Ca    7.6[L]      28 Mar 2025 04:35  Phos  1.8     03-28  Mg     2.0     03-28        MEDICATIONS  (STANDING):  acetaminophen     Tablet .. 975 milliGRAM(s) Oral every 8 hours  chlorhexidine 2% Cloths 1 Application(s) Topical daily  influenza  Vaccine (HIGH DOSE) 0.5 milliLiter(s) IntraMuscular once  methocarbamol 500 milliGRAM(s) Oral every 8 hours  pantoprazole    Tablet 40 milliGRAM(s) Oral before breakfast  polyethylene glycol 3350 17 Gram(s) Oral every 24 hours  pregabalin 75 milliGRAM(s) Oral daily  senna 2 Tablet(s) Oral at bedtime    MEDICATIONS  (PRN):  aluminum hydroxide/magnesium hydroxide/simethicone Suspension 30 milliLiter(s) Oral every 12 hours PRN Indigestion  HYDROmorphone   Tablet 1 milliGRAM(s) Oral every 4 hours PRN Moderate Pain (4 - 6)  HYDROmorphone   Tablet 2 milliGRAM(s) Oral every 4 hours PRN Severe Pain (7 - 10)  HYDROmorphone  Injectable 0.5 milliGRAM(s) IV Push every 4 hours PRN breakthrough  magnesium hydroxide Suspension 30 milliLiter(s) Oral every 12 hours PRN Constipation  melatonin 3 milliGRAM(s) Oral at bedtime PRN Insomnia  ondansetron Injectable 4 milliGRAM(s) IV Push every 6 hours PRN Nausea and/or Vomiting      Vital Signs Last 24 Hrs  T(C): 36.8 (28 Mar 2025 09:56), Max: 37.2 (27 Mar 2025 22:28)  T(F): 98.3 (28 Mar 2025 09:56), Max: 99 (28 Mar 2025 05:00)  HR: 92 (28 Mar 2025 09:56) (88 - 104)  BP: 144/91 (28 Mar 2025 09:56) (89/60 - 148/94)  BP(mean): 91 (27 Mar 2025 22:00) (70 - 103)  RR: 18 (28 Mar 2025 09:56) (17 - 31)  SpO2: 95% (28 Mar 2025 09:56) (90% - 100%)    Parameters below as of 28 Mar 2025 09:56  Patient On (Oxygen Delivery Method): nasal cannula  O2 Flow (L/min): 2    Daily     Daily     Appearance: Alert, responsive, in no acute distress.    Skin: no rash on visible skin. Skin is clean, dry and intact. No bleeding. No abrasions. No ulcerations.    Musculoskeletal:         Lumbar: dressing clean, dry, intact. +HV       Left Lower Extremity: Sensation is grossly intact to light touch. 2+ distal pulses. Cap refill < 2 sec.        Right Lower Extremity: Sensation is grossly intact to light touch. 2+ distal pulses. Cap refill < 2 sec.       Motor exam: [  ]             [ ] Lower extremity            HF(l2)    KE(l3)   TA(l4)  EHL(l5)    GS(s1)                                                 R        5/5        5/5        5/5       5/5         5/5                                               L         5/5 5/5 5/5 5/5 5/5    Primary Orthopedic Assessment:  • 64y Female is status post T6 to pelvis PSF, L2-4 lateral fusion, ALIF L4-S1 POD # 2.    Secondary  Orthopedic Assessment(s):   •     Secondary  Medical Assessment(s):   •     Plan:   • DVT prophylaxis as prescribed, including use of compression devices and ankle pumps  • Continue physical therapy  • Out of Bed as tolerated with assistance of a walker  • Incentive spirometry encouraged  • Pain control as clinically indicated  • Monitor loree    KIM ROLON    [id number]    History: 64y Female is status post T6 to pelvis PSF, L2-4 lateral fusion, ALIF L4-S1 POD # 2.  Patient is doing well and is comfortable. The patient's pain is controlled using the prescribed pain medications. The patient is participating in physical therapy. Denies nausea, vomiting, chest pain, shortness of breath, abdominal pain or fever. No new complaints.    Vital Signs Last 24 Hrs  T(C): 36.8 (28 Mar 2025 09:56), Max: 37.2 (27 Mar 2025 22:28)  T(F): 98.3 (28 Mar 2025 09:56), Max: 99 (28 Mar 2025 05:00)  HR: 92 (28 Mar 2025 09:56) (88 - 104)  BP: 144/91 (28 Mar 2025 09:56) (89/60 - 148/94)  BP(mean): 91 (27 Mar 2025 22:00) (70 - 103)  RR: 18 (28 Mar 2025 09:56) (17 - 31)  SpO2: 95% (28 Mar 2025 09:56) (90% - 100%)    Parameters below as of 28 Mar 2025 09:56  Patient On (Oxygen Delivery Method): nasal cannula  O2 Flow (L/min): 2                            8.9    9.43  )-----------( 200      ( 28 Mar 2025 04:35 )             25.9     03-28    136  |  101  |  16.6  ----------------------------<  109[H]  3.8   |  25.0  |  0.35[L]    Ca    7.6[L]      28 Mar 2025 04:35  Phos  1.8     03-28  Mg     2.0     03-28        MEDICATIONS  (STANDING):  acetaminophen     Tablet .. 975 milliGRAM(s) Oral every 8 hours  chlorhexidine 2% Cloths 1 Application(s) Topical daily  influenza  Vaccine (HIGH DOSE) 0.5 milliLiter(s) IntraMuscular once  methocarbamol 500 milliGRAM(s) Oral every 8 hours  pantoprazole    Tablet 40 milliGRAM(s) Oral before breakfast  polyethylene glycol 3350 17 Gram(s) Oral every 24 hours  pregabalin 75 milliGRAM(s) Oral daily  senna 2 Tablet(s) Oral at bedtime    MEDICATIONS  (PRN):  aluminum hydroxide/magnesium hydroxide/simethicone Suspension 30 milliLiter(s) Oral every 12 hours PRN Indigestion  HYDROmorphone   Tablet 1 milliGRAM(s) Oral every 4 hours PRN Moderate Pain (4 - 6)  HYDROmorphone   Tablet 2 milliGRAM(s) Oral every 4 hours PRN Severe Pain (7 - 10)  HYDROmorphone  Injectable 0.5 milliGRAM(s) IV Push every 4 hours PRN breakthrough  magnesium hydroxide Suspension 30 milliLiter(s) Oral every 12 hours PRN Constipation  melatonin 3 milliGRAM(s) Oral at bedtime PRN Insomnia  ondansetron Injectable 4 milliGRAM(s) IV Push every 6 hours PRN Nausea and/or Vomiting      Vital Signs Last 24 Hrs  T(C): 36.8 (28 Mar 2025 09:56), Max: 37.2 (27 Mar 2025 22:28)  T(F): 98.3 (28 Mar 2025 09:56), Max: 99 (28 Mar 2025 05:00)  HR: 92 (28 Mar 2025 09:56) (88 - 104)  BP: 144/91 (28 Mar 2025 09:56) (89/60 - 148/94)  BP(mean): 91 (27 Mar 2025 22:00) (70 - 103)  RR: 18 (28 Mar 2025 09:56) (17 - 31)  SpO2: 95% (28 Mar 2025 09:56) (90% - 100%)    Parameters below as of 28 Mar 2025 09:56  Patient On (Oxygen Delivery Method): nasal cannula  O2 Flow (L/min): 2    Daily     Daily     Appearance: Alert, responsive, in no acute distress.    Skin: no rash on visible skin. Skin is clean, dry and intact. No bleeding. No abrasions. No ulcerations.    Musculoskeletal:         Lumbar: dressing clean, dry, intact. +HV       Left Lower Extremity: Sensation is grossly intact to light touch. 2+ distal pulses. Cap refill < 2 sec.        Right Lower Extremity: Sensation is grossly intact to light touch. 2+ distal pulses. Cap refill < 2 sec.       Motor exam: [  ]             [ ] Lower extremity            HF(l2)    KE(l3)   TA(l4)  EHL(l5)    GS(s1)                                                 R        5/5        5/5        5/5       5/5         5/5                                               L         5/5 5/5 5/5 5/5 5/5    Primary Orthopedic Assessment:  • 64y Female is status post T6 to pelvis PSF, L2-4 lateral fusion, ALIF L4-S1 POD # 2.    Secondary  Orthopedic Assessment(s):   •     Secondary  Medical Assessment(s):   •     Plan:   • DVT prophylaxis as prescribed, including use of compression devices and ankle pumps  • Continue physical therapy  • Out of Bed as tolerated with assistance of a walker  • Incentive spirometry encouraged  • Pain control as clinically indicated  • Monitor drain  • Discharge planning for home pending drain

## 2025-03-28 NOTE — PROGRESS NOTE ADULT - ASSESSMENT
69-year-old female with a PMH of HTN, osteoporosis, and scoliosis, and admitted for surgical correction of progressive adult spinal deformity. Underwent anterior lumbar interbody fusion L4-L5, L5-S1, and lateral fusion L2-L3, L3-L4 on 3/24/25. Planned for posterior instrumented fusion T9-pelvis (possible T4-pelvis) on 3/26/2 with postoperative hypotension and hypovolemic shock post-op.    Med Recs:    Continue current multimodal pain regimen  Pain controlled  Pain service will sign off  Please reconsult as needed          Discussed with Dr. Corley

## 2025-03-29 PROCEDURE — 74019 RADEX ABDOMEN 2 VIEWS: CPT | Mod: 26

## 2025-03-29 PROCEDURE — 99232 SBSQ HOSP IP/OBS MODERATE 35: CPT

## 2025-03-29 RX ORDER — ENOXAPARIN SODIUM 100 MG/ML
40 INJECTION SUBCUTANEOUS EVERY 24 HOURS
Refills: 0 | Status: DISCONTINUED | OUTPATIENT
Start: 2025-03-29 | End: 2025-03-31

## 2025-03-29 RX ORDER — BISACODYL 5 MG
10 TABLET, DELAYED RELEASE (ENTERIC COATED) ORAL DAILY
Refills: 0 | Status: DISCONTINUED | OUTPATIENT
Start: 2025-03-29 | End: 2025-03-31

## 2025-03-29 RX ORDER — MINERAL OIL
133 OIL (ML) MISCELLANEOUS ONCE
Refills: 0 | Status: COMPLETED | OUTPATIENT
Start: 2025-03-29 | End: 2025-03-29

## 2025-03-29 RX ADMIN — METHOCARBAMOL 500 MILLIGRAM(S): 500 TABLET, FILM COATED ORAL at 05:30

## 2025-03-29 RX ADMIN — Medication 40 MILLIGRAM(S): at 05:30

## 2025-03-29 RX ADMIN — METHOCARBAMOL 500 MILLIGRAM(S): 500 TABLET, FILM COATED ORAL at 13:41

## 2025-03-29 RX ADMIN — Medication 975 MILLIGRAM(S): at 14:41

## 2025-03-29 RX ADMIN — PREGABALIN 75 MILLIGRAM(S): 75 CAPSULE ORAL at 13:41

## 2025-03-29 RX ADMIN — Medication 975 MILLIGRAM(S): at 06:15

## 2025-03-29 RX ADMIN — Medication 2 TABLET(S): at 21:06

## 2025-03-29 RX ADMIN — Medication 3 MILLIGRAM(S): at 21:06

## 2025-03-29 RX ADMIN — POLYETHYLENE GLYCOL 3350 17 GRAM(S): 17 POWDER, FOR SOLUTION ORAL at 17:36

## 2025-03-29 RX ADMIN — Medication 1 APPLICATION(S): at 13:46

## 2025-03-29 RX ADMIN — Medication 975 MILLIGRAM(S): at 05:30

## 2025-03-29 RX ADMIN — Medication 975 MILLIGRAM(S): at 22:06

## 2025-03-29 RX ADMIN — METHOCARBAMOL 500 MILLIGRAM(S): 500 TABLET, FILM COATED ORAL at 21:07

## 2025-03-29 RX ADMIN — MAGNESIUM HYDROXIDE 30 MILLILITER(S): 400 SUSPENSION ORAL at 10:49

## 2025-03-29 RX ADMIN — ENOXAPARIN SODIUM 40 MILLIGRAM(S): 100 INJECTION SUBCUTANEOUS at 13:42

## 2025-03-29 RX ADMIN — Medication 975 MILLIGRAM(S): at 21:06

## 2025-03-29 RX ADMIN — Medication 4 MILLIGRAM(S): at 10:49

## 2025-03-29 RX ADMIN — Medication 975 MILLIGRAM(S): at 13:41

## 2025-03-29 RX ADMIN — Medication 133 MILLILITER(S): at 11:36

## 2025-03-29 NOTE — PROGRESS NOTE ADULT - SUBJECTIVE AND OBJECTIVE BOX
Pt Name: KIM ROLON    MRN: [id number]      Patient is POD#3 s/p lumbar fusion lateral L2-4, Anterior lumbar fusion L4-S1, T6-pelvis fusion. Patient is comfortable in bed. Reports little appetite, this morning she is trying to eat some yogurt.   No new orthopedic complaints.   PAST MEDICAL & SURGICAL HISTORY:  PAST MEDICAL & SURGICAL HISTORY:  H/O osteoporosis      HTN (hypertension)      Scoliosis      Lumbar spondylosis      H/O shoulder surgery      H/O lumpectomy          Allergies: gabapentin (Rash)  sulfa drugs (Other)      Medications: acetaminophen     Tablet .. 975 milliGRAM(s) Oral every 8 hours  aluminum hydroxide/magnesium hydroxide/simethicone Suspension 30 milliLiter(s) Oral every 12 hours PRN  chlorhexidine 2% Cloths 1 Application(s) Topical daily  HYDROmorphone   Tablet 1 milliGRAM(s) Oral every 4 hours PRN  HYDROmorphone   Tablet 2 milliGRAM(s) Oral every 4 hours PRN  HYDROmorphone  Injectable 0.5 milliGRAM(s) IV Push every 4 hours PRN  influenza  Vaccine (HIGH DOSE) 0.5 milliLiter(s) IntraMuscular once  magnesium hydroxide Suspension 30 milliLiter(s) Oral every 12 hours PRN  melatonin 3 milliGRAM(s) Oral at bedtime PRN  methocarbamol 500 milliGRAM(s) Oral every 8 hours  ondansetron Injectable 4 milliGRAM(s) IV Push every 6 hours PRN  pantoprazole    Tablet 40 milliGRAM(s) Oral before breakfast  polyethylene glycol 3350 17 Gram(s) Oral every 24 hours  pregabalin 75 milliGRAM(s) Oral daily  senna 2 Tablet(s) Oral at bedtime        Ambulation: Walking  With Walker                        8.9    9.43  )-----------( 200      ( 28 Mar 2025 04:35 )             25.9     03-28    136  |  101  |  16.6  ----------------------------<  109[H]  3.8   |  25.0  |  0.35[L]    Ca    7.6[L]      28 Mar 2025 04:35  Phos  1.8     03-28  Mg     2.0     03-28        PHYSICAL EXAM:    Vital Signs Last 24 Hrs  T(C): 37.2 (29 Mar 2025 08:43), Max: 37.4 (29 Mar 2025 00:00)  T(F): 98.9 (29 Mar 2025 08:43), Max: 99.3 (29 Mar 2025 00:00)  HR: 94 (29 Mar 2025 08:43) (53 - 94)  BP: 156/95 (29 Mar 2025 08:43) (123/83 - 156/95)  BP(mean): --  RR: 19 (29 Mar 2025 08:43) (17 - 19)  SpO2: 98% (29 Mar 2025 08:43) (93% - 100%)    Parameters below as of 29 Mar 2025 08:43  Patient On (Oxygen Delivery Method): nasal cannula  O2 Flow (L/min): 2    Daily     Daily     Appearance: Alert, responsive, in no acute distress.    Neurological: Sensation is grossly intact to light touch. 5/5 motor function of all extremities. No focal deficits or weaknesses found.    Skin: no rash on visible skin. Skin is clean, dry and intact. No bleeding. No abrasions. No ulcerations. Abdominal lateral and lumbar mepilex dressings remain clean dry and intact. Drain remains in place   Output 205/275 Will leave in place.     Vascular: 2+ distal pulses. Cap refill < 2 sec. No signs of venous   insufficiency   or stasis. No extremity ulcerations. No cyanosis.         A/P:  Pt is a  64y Female POD#3 s/p  lumbar fusion lateral L2-4, Anterior lumbar fusion L4-S1, T6-pelvis fusion.    PLAN:   *WBAT  PT/ gait training  Bowel regimen  Monitor drain output  Lovenox for DVT prophalaxis  Pain control  PMR consulted.

## 2025-03-29 NOTE — PROGRESS NOTE ADULT - ASSESSMENT
65 y/o female with Hx of HTN, Duodenal spasm, osteoporosis, Scoliosis, s/p b/l shoulder, s/p lumpectomy, she has complex adult deformity r/t scoliosis which she has undergone many years of conservative care including injections physical therapy core strengthening home exercise excetra. She is admitted for elective anterior lumbar interbody fusion L4-L5, L5-S1 Lateral Fusion L2-L3, L3-L4 on 3/24/2025 AND Posterior instrumented FusionT9 Pelvis, Possible T4-Pelvis on 3/26/25 with Dr. Cao s/p procedure, she was monitored in ICU post operatively, during the course she has hypocalcemia, her calcium was replaced, require pressors over the course of ICU, now has been weaned off, she also require 1 unit of PRBCs as well for acute blood loss anemia, patient has been weaned off from the Pressors. Medicine consulted for Medical Management.          Scoliosis s/p  anterior lumbar interbody fusion L4-L5, L5-S1 Lateral Fusion L2-L3, L3-L4 on 3/24/2025 AND Posterior instrumented FusionT9 Pelvis, Possible T4-Pelvis on 3/26/25  - abx per ortho   - c/w anti hypertensive to be restarted post op day 02 except if blood pressure goes >150 systolic   - opiate induced constipation regimen   - encouraging incentive spirometry   -c/w local wound care per ortho   -DVT prophylaxis and Pain meds as per Ortho team   -PT/OT and weight bearing per ortho   -cyclobenzaprine 10 mg once a day  -pregabalin 75 mg once a day  -drain care as per Primary team    Constipation  - senna and Miralax  - add enema today  - prn Dulcolax suppository     Acute blood loss anemia due to procedure: Iron supplement, will monitor CBC, if trends further down, will transfuse    Hx of HTN: At home takes valsartan-hydrochlorothiazide 80 mg-12.5 mg once a day (at bedtime). IF SBP>150 can resume Valsartain at bedtime.     Osteoporosis: On Boniva 150 mg every 4 weeks    dvt ppx: Lovenox

## 2025-03-29 NOTE — PROGRESS NOTE ADULT - SUBJECTIVE AND OBJECTIVE BOX
[facility] Division of Hospital Medicine    Chief Complaint:  Scoliosis    SUBJECTIVE / OVERNIGHT EVENTS: Patient seen and examined. Reports abdominal discomfort, has not had a bm since Sunday. Reports nausea but no vomiting, She is passing some gas. Denies chest pain and shortness of breath.     Patient denies chest pain, SOB,  fever, chills, dysuria or any other complaints. All remainder ROS negative.     MEDICATIONS  (STANDING):  acetaminophen     Tablet .. 975 milliGRAM(s) Oral every 8 hours  chlorhexidine 2% Cloths 1 Application(s) Topical daily  enoxaparin Injectable 40 milliGRAM(s) SubCutaneous every 24 hours  influenza  Vaccine (HIGH DOSE) 0.5 milliLiter(s) IntraMuscular once  methocarbamol 500 milliGRAM(s) Oral every 8 hours  pantoprazole    Tablet 40 milliGRAM(s) Oral before breakfast  polyethylene glycol 3350 17 Gram(s) Oral every 24 hours  pregabalin 75 milliGRAM(s) Oral daily  senna 2 Tablet(s) Oral at bedtime    MEDICATIONS  (PRN):  aluminum hydroxide/magnesium hydroxide/simethicone Suspension 30 milliLiter(s) Oral every 12 hours PRN Indigestion  bisacodyl Suppository 10 milliGRAM(s) Rectal daily PRN Constipation  HYDROmorphone   Tablet 2 milliGRAM(s) Oral every 4 hours PRN Severe Pain (7 - 10)  HYDROmorphone   Tablet 1 milliGRAM(s) Oral every 4 hours PRN Moderate Pain (4 - 6)  HYDROmorphone  Injectable 0.5 milliGRAM(s) IV Push every 4 hours PRN breakthrough  magnesium hydroxide Suspension 30 milliLiter(s) Oral every 12 hours PRN Constipation  melatonin 3 milliGRAM(s) Oral at bedtime PRN Insomnia  ondansetron Injectable 4 milliGRAM(s) IV Push every 6 hours PRN Nausea and/or Vomiting        I&O's Summary    28 Mar 2025 07:01  -  29 Mar 2025 07:00  --------------------------------------------------------  IN: 500 mL / OUT: 1125 mL / NET: -625 mL        PHYSICAL EXAM:  Vital Signs Last 24 Hrs  T(C): 37.2 (29 Mar 2025 08:43), Max: 37.4 (29 Mar 2025 00:00)  T(F): 98.9 (29 Mar 2025 08:43), Max: 99.3 (29 Mar 2025 00:00)  HR: 94 (29 Mar 2025 08:43) (53 - 94)  BP: 156/95 (29 Mar 2025 08:43) (138/88 - 156/95)  BP(mean): --  RR: 19 (29 Mar 2025 08:43) (17 - 19)  SpO2: 98% (29 Mar 2025 08:43) (93% - 100%)    Parameters below as of 29 Mar 2025 08:43  Patient On (Oxygen Delivery Method): nasal cannula  O2 Flow (L/min): 2          CONSTITUTIONAL: NAD,  ENMT: Moist oral mucosa, no pharyngeal injection or exudates;  RESPIRATORY: Normal respiratory effort; lungs are clear to auscultation bilaterally  CARDIOVASCULAR: Regular rate and rhythm, normal S1 and S2, no murmur/rub/gallop; No lower extremity edema  ABDOMEN: nontender to palpation, normoactive bowel sounds, distended no rebound or guarding  MUSCLOSKELETAL:  no joint swelling or tenderness to palpation, back dressing c/d/i  +drain  PSYCH: A+O to person, place, and time; affect appropriate  NEUROLOGY: CN 2-12 are intact and symmetric;  SKIN: No rashes; no palpable lesions    LABS:                        8.9    9.43  )-----------( 200      ( 28 Mar 2025 04:35 )             25.9     03-28    136  |  101  |  16.6  ----------------------------<  109[H]  3.8   |  25.0  |  0.35[L]    Ca    7.6[L]      28 Mar 2025 04:35  Phos  1.8     03-28  Mg     2.0     03-28      PT/INR - ( 27 Mar 2025 17:10 )   PT: 10.4 sec;   INR: 0.90 ratio         PTT - ( 27 Mar 2025 17:10 )  PTT:22.8 sec      Urinalysis Basic - ( 28 Mar 2025 04:35 )    Color: x / Appearance: x / SG: x / pH: x  Gluc: 109 mg/dL / Ketone: x  / Bili: x / Urobili: x   Blood: x / Protein: x / Nitrite: x   Leuk Esterase: x / RBC: x / WBC x   Sq Epi: x / Non Sq Epi: x / Bacteria: x        CAPILLARY BLOOD GLUCOSE            RADIOLOGY & ADDITIONAL TESTS:  Results Reviewed:   Imaging Personally Reviewed:  Electrocardiogram Personally Reviewed:

## 2025-03-30 LAB
ALBUMIN SERPL ELPH-MCNC: 2.7 G/DL — LOW (ref 3.3–5.2)
ALP SERPL-CCNC: 138 U/L — HIGH (ref 40–120)
ALT FLD-CCNC: 39 U/L — HIGH
ANION GAP SERPL CALC-SCNC: 12 MMOL/L — SIGNIFICANT CHANGE UP (ref 5–17)
AST SERPL-CCNC: 46 U/L — HIGH
BASOPHILS # BLD AUTO: 0.02 K/UL — SIGNIFICANT CHANGE UP (ref 0–0.2)
BASOPHILS NFR BLD AUTO: 0.2 % — SIGNIFICANT CHANGE UP (ref 0–2)
BILIRUB SERPL-MCNC: 0.5 MG/DL — SIGNIFICANT CHANGE UP (ref 0.4–2)
BUN SERPL-MCNC: 10.2 MG/DL — SIGNIFICANT CHANGE UP (ref 8–20)
CALCIUM SERPL-MCNC: 7.8 MG/DL — LOW (ref 8.4–10.5)
CHLORIDE SERPL-SCNC: 98 MMOL/L — SIGNIFICANT CHANGE UP (ref 96–108)
CO2 SERPL-SCNC: 24 MMOL/L — SIGNIFICANT CHANGE UP (ref 22–29)
CREAT SERPL-MCNC: 0.3 MG/DL — LOW (ref 0.5–1.3)
EGFR: 117 ML/MIN/1.73M2 — SIGNIFICANT CHANGE UP
EGFR: 117 ML/MIN/1.73M2 — SIGNIFICANT CHANGE UP
EOSINOPHIL # BLD AUTO: 0.15 K/UL — SIGNIFICANT CHANGE UP (ref 0–0.5)
EOSINOPHIL NFR BLD AUTO: 1.4 % — SIGNIFICANT CHANGE UP (ref 0–6)
GLUCOSE SERPL-MCNC: 106 MG/DL — HIGH (ref 70–99)
HCT VFR BLD CALC: 27.2 % — LOW (ref 34.5–45)
HGB BLD-MCNC: 9.1 G/DL — LOW (ref 11.5–15.5)
IMM GRANULOCYTES # BLD AUTO: 0.21 K/UL — HIGH (ref 0–0.07)
IMM GRANULOCYTES NFR BLD AUTO: 1.9 % — HIGH (ref 0–0.9)
LYMPHOCYTES # BLD AUTO: 1.04 K/UL — SIGNIFICANT CHANGE UP (ref 1–3.3)
LYMPHOCYTES NFR BLD AUTO: 9.6 % — LOW (ref 13–44)
MAGNESIUM SERPL-MCNC: 2 MG/DL — SIGNIFICANT CHANGE UP (ref 1.6–2.6)
MCHC RBC-ENTMCNC: 30 PG — SIGNIFICANT CHANGE UP (ref 27–34)
MCHC RBC-ENTMCNC: 33.5 G/DL — SIGNIFICANT CHANGE UP (ref 32–36)
MCV RBC AUTO: 89.8 FL — SIGNIFICANT CHANGE UP (ref 80–100)
MONOCYTES # BLD AUTO: 0.84 K/UL — SIGNIFICANT CHANGE UP (ref 0–0.9)
MONOCYTES NFR BLD AUTO: 7.7 % — SIGNIFICANT CHANGE UP (ref 2–14)
NEUTROPHILS # BLD AUTO: 8.58 K/UL — HIGH (ref 1.8–7.4)
NEUTROPHILS NFR BLD AUTO: 79.2 % — HIGH (ref 43–77)
NRBC # BLD AUTO: 0.03 K/UL — HIGH (ref 0–0)
NRBC # FLD: 0.03 K/UL — HIGH (ref 0–0)
NRBC BLD AUTO-RTO: 0 /100 WBCS — SIGNIFICANT CHANGE UP (ref 0–0)
PHOSPHATE SERPL-MCNC: 1.9 MG/DL — LOW (ref 2.4–4.7)
PLATELET # BLD AUTO: 363 K/UL — SIGNIFICANT CHANGE UP (ref 150–400)
PMV BLD: 9 FL — SIGNIFICANT CHANGE UP (ref 7–13)
POTASSIUM SERPL-MCNC: 3.7 MMOL/L — SIGNIFICANT CHANGE UP (ref 3.5–5.3)
POTASSIUM SERPL-SCNC: 3.7 MMOL/L — SIGNIFICANT CHANGE UP (ref 3.5–5.3)
PROT SERPL-MCNC: 5.2 G/DL — LOW (ref 6.6–8.7)
RBC # BLD: 3.03 M/UL — LOW (ref 3.8–5.2)
RBC # FLD: 15.3 % — HIGH (ref 10.3–14.5)
SODIUM SERPL-SCNC: 134 MMOL/L — LOW (ref 135–145)
WBC # BLD: 10.84 K/UL — HIGH (ref 3.8–10.5)
WBC # FLD AUTO: 10.84 K/UL — HIGH (ref 3.8–10.5)

## 2025-03-30 PROCEDURE — 99232 SBSQ HOSP IP/OBS MODERATE 35: CPT

## 2025-03-30 RX ORDER — MAGNESIUM CITRATE
296 SOLUTION, ORAL ORAL ONCE
Refills: 0 | Status: COMPLETED | OUTPATIENT
Start: 2025-03-30 | End: 2025-03-30

## 2025-03-30 RX ORDER — POTASSIUM PHOSPHATE, MONOBASIC POTASSIUM PHOSPHATE, DIBASIC INJECTION, 236; 224 MG/ML; MG/ML
15 SOLUTION, CONCENTRATE INTRAVENOUS ONCE
Refills: 0 | Status: COMPLETED | OUTPATIENT
Start: 2025-03-30 | End: 2025-03-30

## 2025-03-30 RX ADMIN — Medication 975 MILLIGRAM(S): at 22:01

## 2025-03-30 RX ADMIN — Medication 75 MILLILITER(S): at 10:57

## 2025-03-30 RX ADMIN — Medication 975 MILLIGRAM(S): at 23:01

## 2025-03-30 RX ADMIN — Medication 975 MILLIGRAM(S): at 14:09

## 2025-03-30 RX ADMIN — Medication 40 MILLIGRAM(S): at 05:26

## 2025-03-30 RX ADMIN — Medication 1 APPLICATION(S): at 11:55

## 2025-03-30 RX ADMIN — Medication 975 MILLIGRAM(S): at 05:25

## 2025-03-30 RX ADMIN — METHOCARBAMOL 500 MILLIGRAM(S): 500 TABLET, FILM COATED ORAL at 22:01

## 2025-03-30 RX ADMIN — METHOCARBAMOL 500 MILLIGRAM(S): 500 TABLET, FILM COATED ORAL at 13:10

## 2025-03-30 RX ADMIN — Medication 296 MILLILITER(S): at 10:58

## 2025-03-30 RX ADMIN — PREGABALIN 75 MILLIGRAM(S): 75 CAPSULE ORAL at 11:55

## 2025-03-30 RX ADMIN — Medication 975 MILLIGRAM(S): at 06:25

## 2025-03-30 RX ADMIN — Medication 10 MILLIGRAM(S): at 05:27

## 2025-03-30 RX ADMIN — METHOCARBAMOL 500 MILLIGRAM(S): 500 TABLET, FILM COATED ORAL at 05:26

## 2025-03-30 RX ADMIN — POTASSIUM PHOSPHATE, MONOBASIC POTASSIUM PHOSPHATE, DIBASIC INJECTION, 62.5 MILLIMOLE(S): 236; 224 SOLUTION, CONCENTRATE INTRAVENOUS at 11:57

## 2025-03-30 RX ADMIN — ENOXAPARIN SODIUM 40 MILLIGRAM(S): 100 INJECTION SUBCUTANEOUS at 13:10

## 2025-03-30 RX ADMIN — Medication 4 MILLIGRAM(S): at 21:57

## 2025-03-30 RX ADMIN — Medication 975 MILLIGRAM(S): at 13:09

## 2025-03-30 NOTE — PROGRESS NOTE ADULT - SUBJECTIVE AND OBJECTIVE BOX
[id number]  KIM ROLON    History: 64y Female is status post T6 to pelvis PSF, POD#4. Also s/p L2-4 lateral fusion, ALIF L4-S1 POD # 6.  Patient is doing well and is comfortable. The patient's pain is controlled using the prescribed pain medications. The patient is participating in physical therapy. small bm, No N,V. No appetite. Denies any acute motor or sensory changes. No other orthopedic complaints.     T(C): 36.7 (03-30-25 @ 04:15), Max: 37.6 (03-30-25 @ 00:08)  HR: 83 (03-30-25 @ 04:15) (83 - 107)  BP: 161/92 (03-30-25 @ 04:15) (138/91 - 161/92)  RR: 18 (03-30-25 @ 04:15) (18 - 19)  SpO2: 98% (03-30-25 @ 04:15) (91% - 98%)        Physical Exam:    General: Awake, alert, in NAD  Spine: Dressings  C/D/I- anterior and lateral dressings, post dressing with bloody dc on prox end, +drain present with 60/160. Drain removed without issue. Minimal bloody dc at drain site. Post dressing changed, steri strips intact. No dc. No erythema.   Motor exam: [ x ]      Upper extremity                                  Delt        Bic         Tric       WF      WE                                               R         5/5        5/5        5/5       5/5       5/5                                               L          5/5        5/5        5/5       5/5      5/5           Lower extremity                                   HF         KE          TA       EHL         GS                                                 R        5/5        5/5        5/5       5/5         5/5                                               L         5/5        5/5       5/5       5/5          5/5    SILT C5-T1 B/L, L2-S1 intact B/L, DP pulses 2+ B/L  Calf soft, NT B/L    03-29-25 @ 07:01  -  03-30-25 @ 07:00  --------------------------------------------------------  IN: 600 mL / OUT: 1260 mL / NET: -660 mL        A/P: Patient is a 64y Female s/p T6 to pelvis PSF, POD#4. Also s/p L2-4 lateral fusion, ALIF L4-S1 POD # 6     -post op ileus  - medicine management- NPO, IVF, monitor  - pain control  - PT WBAT  - DVTp- SCDs/Lovenox  - Drain removed and posterior dressing changed  - dc planning for home after cleared by PT and medicine

## 2025-03-30 NOTE — PROGRESS NOTE ADULT - ASSESSMENT
65 y/o female with Hx of HTN, Duodenal spasm, osteoporosis, Scoliosis, s/p b/l shoulder, s/p lumpectomy, she has complex adult deformity r/t scoliosis which she has undergone many years of conservative care including injections physical therapy core strengthening home exercise excetra. She is admitted for elective anterior lumbar interbody fusion L4-L5, L5-S1 Lateral Fusion L2-L3, L3-L4 on 3/24/2025 AND Posterior instrumented FusionT9 Pelvis, Possible T4-Pelvis on 3/26/25 with Dr. Cao s/p procedure, she was monitored in ICU post operatively, during the course she has hypocalcemia, her calcium was replaced, require pressors over the course of ICU, now has been weaned off, she also require 1 unit of PRBCs as well for acute blood loss anemia, patient has been weaned off from the Pressors. Medicine consulted for Medical Management.          Scoliosis s/p  anterior lumbar interbody fusion L4-L5, L5-S1 Lateral Fusion L2-L3, L3-L4 on 3/24/2025 AND Posterior instrumented FusionT9 Pelvis, Possible T4-Pelvis on 3/26/25  - opiate induced constipation regimen   - encouraging incentive spirometry   -c/w local wound care per ortho   -DVT prophylaxis and Pain meds as per Ortho team   -PT/OT and weight bearing per ortho   -cyclobenzaprine 10 mg once a day  -pregabalin 75 mg once a day  -drain care as per Primary team, removed today    Post operative ileus  cxr noted, small bm today and passing some gas  - npo except meds  - IVFs  - labs, will replete electrolytes if needed  - Mag citrate today  - senna and Miralax  - prn Dulcolax suppository     Acute blood loss anemia due to procedure: Iron supplement, will monitor CBC, if trends further down, will transfuse    Hx of HTN: At home takes valsartan-hydrochlorothiazide 80 mg-12.5 mg once a day (at bedtime). IF SBP>150 can resume Valsartain at bedtime.     Osteoporosis: On Boniva 150 mg every 4 weeks    dvt ppx: Lovenox    Discussed with Ortho PA

## 2025-03-30 NOTE — PROGRESS NOTE ADULT - SUBJECTIVE AND OBJECTIVE BOX
[facility] Division of Hospital Medicine    Chief Complaint:  Scoliosis    SUBJECTIVE / OVERNIGHT EVENTS: Patient seen and examined. Passed gas this am and had small bm. No vomiting but still with adnominal discomfort. Reports she has more of an appetite today. Informed her she will need to be npo for now. No chest pain and no shortness of breath.     Patient denies chest pain, SOB, fever, chills, dysuria or any other complaints. All remainder ROS negative.     MEDICATIONS  (STANDING):  acetaminophen     Tablet .. 975 milliGRAM(s) Oral every 8 hours  chlorhexidine 2% Cloths 1 Application(s) Topical daily  enoxaparin Injectable 40 milliGRAM(s) SubCutaneous every 24 hours  influenza  Vaccine (HIGH DOSE) 0.5 milliLiter(s) IntraMuscular once  magnesium citrate Oral Solution 296 milliLiter(s) Oral once  methocarbamol 500 milliGRAM(s) Oral every 8 hours  pantoprazole    Tablet 40 milliGRAM(s) Oral before breakfast  polyethylene glycol 3350 17 Gram(s) Oral every 24 hours  pregabalin 75 milliGRAM(s) Oral daily  senna 2 Tablet(s) Oral at bedtime  sodium chloride 0.9%. 1000 milliLiter(s) (75 mL/Hr) IV Continuous <Continuous>    MEDICATIONS  (PRN):  aluminum hydroxide/magnesium hydroxide/simethicone Suspension 30 milliLiter(s) Oral every 12 hours PRN Indigestion  bisacodyl Suppository 10 milliGRAM(s) Rectal daily PRN Constipation  HYDROmorphone   Tablet 1 milliGRAM(s) Oral every 4 hours PRN Moderate Pain (4 - 6)  HYDROmorphone   Tablet 2 milliGRAM(s) Oral every 4 hours PRN Severe Pain (7 - 10)  HYDROmorphone  Injectable 0.5 milliGRAM(s) IV Push every 4 hours PRN breakthrough  magnesium hydroxide Suspension 30 milliLiter(s) Oral every 12 hours PRN Constipation  melatonin 3 milliGRAM(s) Oral at bedtime PRN Insomnia  ondansetron Injectable 4 milliGRAM(s) IV Push every 6 hours PRN Nausea and/or Vomiting        I&O's Summary    29 Mar 2025 07:01  -  30 Mar 2025 07:00  --------------------------------------------------------  IN: 600 mL / OUT: 1260 mL / NET: -660 mL        PHYSICAL EXAM:  Vital Signs Last 24 Hrs  T(C): 36.7 (30 Mar 2025 09:56), Max: 37.6 (30 Mar 2025 00:08)  T(F): 98.1 (30 Mar 2025 09:56), Max: 99.6 (30 Mar 2025 00:08)  HR: 91 (30 Mar 2025 09:56) (83 - 107)  BP: 117/76 (30 Mar 2025 09:56) (117/76 - 161/92)  BP(mean): 90 (30 Mar 2025 09:56) (90 - 106)  RR: 20 (30 Mar 2025 09:56) (18 - 20)  SpO2: 98% (30 Mar 2025 04:15) (91% - 98%)    Parameters below as of 30 Mar 2025 04:15  Patient On (Oxygen Delivery Method): nasal cannula  O2 Flow (L/min): 2    CONSTITUTIONAL: NAD  ENMT: Moist oral mucosa, no pharyngeal injection or exudates;  RESPIRATORY: Normal respiratory effort; lungs are clear to auscultation bilaterally  CARDIOVASCULAR: Regular rate and rhythm, normal S1 and S2,  No lower extremity edema  ABDOMEN: nontender to palpation, + bowel sounds, distended, no rebound or guarding  MUSCLOSKELETAL:  no joint swelling or tenderness to palpation, back dressing c/d/i  +drain  PSYCH: A+O to person, place, and time; affect appropriate  NEUROLOGY: CN 2-12 are intact and symmetric;  SKIN: No rashes; no palpable lesions    LABS:                        9.1    10.84 )-----------( 363      ( 30 Mar 2025 09:13 )             27.2     03-30    134[L]  |  98  |  10.2  ----------------------------<  106[H]  3.7   |  24.0  |  0.30[L]    Ca    7.8[L]      30 Mar 2025 09:13  Phos  1.9     03-30  Mg     2.0     03-30    TPro  5.2[L]  /  Alb  2.7[L]  /  TBili  0.5  /  DBili  x   /  AST  46[H]  /  ALT  39[H]  /  AlkPhos  138[H]  03-30          Urinalysis Basic - ( 30 Mar 2025 09:13 )    Color: x / Appearance: x / SG: x / pH: x  Gluc: 106 mg/dL / Ketone: x  / Bili: x / Urobili: x   Blood: x / Protein: x / Nitrite: x   Leuk Esterase: x / RBC: x / WBC x   Sq Epi: x / Non Sq Epi: x / Bacteria: x        CAPILLARY BLOOD GLUCOSE            RADIOLOGY & ADDITIONAL TESTS:  Results Reviewed:   Imaging Personally Reviewed:  Electrocardiogram Personally Reviewed:

## 2025-03-30 NOTE — PROGRESS NOTE ADULT - ASSESSMENT
Attending statement:  I have personally seen this patient, and formed a face to face diagnostic evaluation on this patient on this date.  I have reviewed the PA, NP and or Medical/PA student and/or Resident documentation and agree with the history, physical exam and plan of care except if noted otherwise.

## 2025-03-31 ENCOUNTER — TRANSCRIPTION ENCOUNTER (OUTPATIENT)
Age: 65
End: 2025-03-31

## 2025-03-31 VITALS
RESPIRATION RATE: 18 BRPM | TEMPERATURE: 98 F | SYSTOLIC BLOOD PRESSURE: 124 MMHG | HEART RATE: 90 BPM | OXYGEN SATURATION: 98 % | DIASTOLIC BLOOD PRESSURE: 78 MMHG

## 2025-03-31 LAB
ANION GAP SERPL CALC-SCNC: 11 MMOL/L — SIGNIFICANT CHANGE UP (ref 5–17)
BUN SERPL-MCNC: 11.9 MG/DL — SIGNIFICANT CHANGE UP (ref 8–20)
CALCIUM SERPL-MCNC: 7.7 MG/DL — LOW (ref 8.4–10.5)
CHLORIDE SERPL-SCNC: 102 MMOL/L — SIGNIFICANT CHANGE UP (ref 96–108)
CO2 SERPL-SCNC: 24 MMOL/L — SIGNIFICANT CHANGE UP (ref 22–29)
CREAT SERPL-MCNC: 0.52 MG/DL — SIGNIFICANT CHANGE UP (ref 0.5–1.3)
EGFR: 102 ML/MIN/1.73M2 — SIGNIFICANT CHANGE UP
EGFR: 102 ML/MIN/1.73M2 — SIGNIFICANT CHANGE UP
GLUCOSE SERPL-MCNC: 112 MG/DL — HIGH (ref 70–99)
HCT VFR BLD CALC: 28.3 % — LOW (ref 34.5–45)
HGB BLD-MCNC: 9.5 G/DL — LOW (ref 11.5–15.5)
MAGNESIUM SERPL-MCNC: 2.2 MG/DL — SIGNIFICANT CHANGE UP (ref 1.6–2.6)
MCHC RBC-ENTMCNC: 30 PG — SIGNIFICANT CHANGE UP (ref 27–34)
MCHC RBC-ENTMCNC: 33.6 G/DL — SIGNIFICANT CHANGE UP (ref 32–36)
MCV RBC AUTO: 89.3 FL — SIGNIFICANT CHANGE UP (ref 80–100)
NRBC # BLD AUTO: 0.02 K/UL — HIGH (ref 0–0)
NRBC # FLD: 0.02 K/UL — HIGH (ref 0–0)
NRBC BLD AUTO-RTO: 0 /100 WBCS — SIGNIFICANT CHANGE UP (ref 0–0)
PLATELET # BLD AUTO: 394 K/UL — SIGNIFICANT CHANGE UP (ref 150–400)
PMV BLD: 8.6 FL — SIGNIFICANT CHANGE UP (ref 7–13)
POTASSIUM SERPL-MCNC: 3.6 MMOL/L — SIGNIFICANT CHANGE UP (ref 3.5–5.3)
POTASSIUM SERPL-SCNC: 3.6 MMOL/L — SIGNIFICANT CHANGE UP (ref 3.5–5.3)
RBC # BLD: 3.17 M/UL — LOW (ref 3.8–5.2)
RBC # FLD: 15.2 % — HIGH (ref 10.3–14.5)
SODIUM SERPL-SCNC: 137 MMOL/L — SIGNIFICANT CHANGE UP (ref 135–145)
WBC # BLD: 9.27 K/UL — SIGNIFICANT CHANGE UP (ref 3.8–10.5)
WBC # FLD AUTO: 9.27 K/UL — SIGNIFICANT CHANGE UP (ref 3.8–10.5)

## 2025-03-31 PROCEDURE — 84295 ASSAY OF SERUM SODIUM: CPT

## 2025-03-31 PROCEDURE — C1737: CPT

## 2025-03-31 PROCEDURE — 82947 ASSAY GLUCOSE BLOOD QUANT: CPT

## 2025-03-31 PROCEDURE — 82330 ASSAY OF CALCIUM: CPT

## 2025-03-31 PROCEDURE — 86850 RBC ANTIBODY SCREEN: CPT

## 2025-03-31 PROCEDURE — 80048 BASIC METABOLIC PNL TOTAL CA: CPT

## 2025-03-31 PROCEDURE — P9045: CPT

## 2025-03-31 PROCEDURE — P9016: CPT

## 2025-03-31 PROCEDURE — C9399: CPT

## 2025-03-31 PROCEDURE — 97530 THERAPEUTIC ACTIVITIES: CPT

## 2025-03-31 PROCEDURE — C1889: CPT

## 2025-03-31 PROCEDURE — C1713: CPT

## 2025-03-31 PROCEDURE — 74019 RADEX ABDOMEN 2 VIEWS: CPT

## 2025-03-31 PROCEDURE — P9040: CPT

## 2025-03-31 PROCEDURE — 99233 SBSQ HOSP IP/OBS HIGH 50: CPT

## 2025-03-31 PROCEDURE — 86901 BLOOD TYPING SEROLOGIC RH(D): CPT

## 2025-03-31 PROCEDURE — 82803 BLOOD GASES ANY COMBINATION: CPT

## 2025-03-31 PROCEDURE — 84100 ASSAY OF PHOSPHORUS: CPT

## 2025-03-31 PROCEDURE — 83735 ASSAY OF MAGNESIUM: CPT

## 2025-03-31 PROCEDURE — 36430 TRANSFUSION BLD/BLD COMPNT: CPT

## 2025-03-31 PROCEDURE — 85027 COMPLETE CBC AUTOMATED: CPT

## 2025-03-31 PROCEDURE — 74018 RADEX ABDOMEN 1 VIEW: CPT

## 2025-03-31 PROCEDURE — 82435 ASSAY OF BLOOD CHLORIDE: CPT

## 2025-03-31 PROCEDURE — 86900 BLOOD TYPING SEROLOGIC ABO: CPT

## 2025-03-31 PROCEDURE — 87640 STAPH A DNA AMP PROBE: CPT

## 2025-03-31 PROCEDURE — 97116 GAIT TRAINING THERAPY: CPT

## 2025-03-31 PROCEDURE — 84132 ASSAY OF SERUM POTASSIUM: CPT

## 2025-03-31 PROCEDURE — 85014 HEMATOCRIT: CPT

## 2025-03-31 PROCEDURE — 85610 PROTHROMBIN TIME: CPT

## 2025-03-31 PROCEDURE — 85384 FIBRINOGEN ACTIVITY: CPT

## 2025-03-31 PROCEDURE — 87641 MR-STAPH DNA AMP PROBE: CPT

## 2025-03-31 PROCEDURE — C1769: CPT

## 2025-03-31 PROCEDURE — 85730 THROMBOPLASTIN TIME PARTIAL: CPT

## 2025-03-31 PROCEDURE — 83605 ASSAY OF LACTIC ACID: CPT

## 2025-03-31 PROCEDURE — 99232 SBSQ HOSP IP/OBS MODERATE 35: CPT

## 2025-03-31 PROCEDURE — C1763: CPT

## 2025-03-31 PROCEDURE — 97535 SELF CARE MNGMENT TRAINING: CPT

## 2025-03-31 PROCEDURE — 85018 HEMOGLOBIN: CPT

## 2025-03-31 PROCEDURE — 80053 COMPREHEN METABOLIC PANEL: CPT

## 2025-03-31 PROCEDURE — 97112 NEUROMUSCULAR REEDUCATION: CPT

## 2025-03-31 PROCEDURE — 36415 COLL VENOUS BLD VENIPUNCTURE: CPT

## 2025-03-31 PROCEDURE — 85025 COMPLETE CBC W/AUTO DIFF WBC: CPT

## 2025-03-31 PROCEDURE — 76000 FLUOROSCOPY <1 HR PHYS/QHP: CPT

## 2025-03-31 RX ORDER — METHOCARBAMOL 500 MG/1
1 TABLET, FILM COATED ORAL
Qty: 42 | Refills: 0
Start: 2025-03-31 | End: 2025-04-13

## 2025-03-31 RX ORDER — ACETAMINOPHEN 500 MG/5ML
3 LIQUID (ML) ORAL
Qty: 0 | Refills: 0 | DISCHARGE
Start: 2025-03-31

## 2025-03-31 RX ORDER — OXYCODONE HYDROCHLORIDE 30 MG/1
1 TABLET ORAL
Qty: 28 | Refills: 0
Start: 2025-03-31 | End: 2025-04-06

## 2025-03-31 RX ORDER — PREGABALIN 75 MG/1
1 CAPSULE ORAL
Qty: 30 | Refills: 0
Start: 2025-03-31 | End: 2025-04-29

## 2025-03-31 RX ORDER — SENNOSIDES, DOCUSATE SODIUM 8.6; 5 MG/1; MG/1
2 TABLET ORAL
Qty: 20 | Refills: 0
Start: 2025-03-31 | End: 2025-04-09

## 2025-03-31 RX ORDER — RIVAROXABAN 10 MG/1
1 TABLET, FILM COATED ORAL
Qty: 21 | Refills: 0
Start: 2025-03-31 | End: 2025-04-20

## 2025-03-31 RX ORDER — NALOXONE HYDROCHLORIDE 0.4 MG/ML
1 INJECTION, SOLUTION INTRAMUSCULAR; INTRAVENOUS; SUBCUTANEOUS
Qty: 1 | Refills: 0
Start: 2025-03-31

## 2025-03-31 RX ADMIN — METHOCARBAMOL 500 MILLIGRAM(S): 500 TABLET, FILM COATED ORAL at 14:18

## 2025-03-31 RX ADMIN — Medication 75 MILLILITER(S): at 02:01

## 2025-03-31 RX ADMIN — Medication 975 MILLIGRAM(S): at 14:18

## 2025-03-31 RX ADMIN — METHOCARBAMOL 500 MILLIGRAM(S): 500 TABLET, FILM COATED ORAL at 05:10

## 2025-03-31 RX ADMIN — ENOXAPARIN SODIUM 40 MILLIGRAM(S): 100 INJECTION SUBCUTANEOUS at 14:18

## 2025-03-31 RX ADMIN — Medication 975 MILLIGRAM(S): at 05:10

## 2025-03-31 RX ADMIN — PREGABALIN 75 MILLIGRAM(S): 75 CAPSULE ORAL at 11:18

## 2025-03-31 RX ADMIN — Medication 975 MILLIGRAM(S): at 06:10

## 2025-03-31 RX ADMIN — Medication 30 MILLILITER(S): at 14:18

## 2025-03-31 RX ADMIN — Medication 1 APPLICATION(S): at 11:18

## 2025-03-31 RX ADMIN — Medication 40 MILLIGRAM(S): at 05:11

## 2025-03-31 RX ADMIN — Medication 975 MILLIGRAM(S): at 15:18

## 2025-03-31 NOTE — PROGRESS NOTE ADULT - SUBJECTIVE AND OBJECTIVE BOX
CC: Patient being seen for rehabilitation follow up.  Patient reports she is better.  BM have improved.   Patient pending DC home.     FUNCTIONAL PROGRESS  3/30 PT  Sit-Stand Transfer Training  Transfer Training Sit-to-Stand Transfer: independent;  rolling walker  Transfer Training Stand-to-Sit Transfer: independent;  rolling walker  Sit-to-Stand Transfer Training Transfer Safety Analysis: decreased strength;  decreased flexibility;  pain;  rolling walker    Gait Training  Gait Training: independent;  rolling walker;  150 feet  Gait Analysis: 3-point gait   decreased rosita;  decreased strength;  impaired balance;  150 feet;  rolling walker    3/30 OT  Sit-Stand Transfer Training  Transfer Training Sit-to-Stand Transfer: independent;  weight-bearing as tolerated   rolling walker  Transfer Training Stand-to-Sit Transfer: independent;  weight-bearing as tolerated   rolling walker  Sit-to-Stand Transfer Training Transfer Safety Analysis: decreased balance;  pain;  rolling walker    Toilet Transfer Training  Transfer Training Toilet Transfer: supervsion;  verbal cues;  weight-bearing as tolerated   rolling walker;  low toilet   Toilet Transfer Training Transfer Safety Analysis: decreased weight-shifting ability;  decreased balance;  pain;  decreased strength;  rolling walker    Functional Endurance  Functional Endurance Detail: Pt ambulated with modified independence and RW, +external cues around bed area, to/from the bathroom and in the hallway demonstrating good safety awareness and obstacle negotiation. Pt educated in energy conservation techniques including proper breathing and activity pacing.     Lower Body Dressing Training  Lower Body Dressing Training Assistance: moderate assist (50% patient effort);  pt with decreased flexibility, deferred LB device training states spouse is able and willing to assist with task as needed ;  decreased flexibility;  pain;  spinal precautions     Grooming Training  Grooming Training Assistance: independent;  pain;  decreased strength    Toilet Training  Toilet Training Assistance: moderate assist (50% patient effort);  decreased flexibility;  decreased ROM;  impaired balance;  pain    VITALS  T(C): 37.1 (03-31-25 @ 08:29), Max: 37.1 (03-30-25 @ 13:00)  HR: 98 (03-31-25 @ 08:29) (86 - 98)  BP: 144/89 (03-31-25 @ 08:29) (117/76 - 144/89)  RR: 17 (03-31-25 @ 08:29) (17 - 20)  SpO2: 93% (03-31-25 @ 08:29) (91% - 96%)  Wt(kg): --    MEDICATIONS   acetaminophen     Tablet .. 975 milliGRAM(s) every 8 hours  aluminum hydroxide/magnesium hydroxide/simethicone Suspension 30 milliLiter(s) every 12 hours PRN  bisacodyl Suppository 10 milliGRAM(s) daily PRN  chlorhexidine 2% Cloths 1 Application(s) daily  enoxaparin Injectable 40 milliGRAM(s) every 24 hours  HYDROmorphone   Tablet 1 milliGRAM(s) every 4 hours PRN  HYDROmorphone   Tablet 2 milliGRAM(s) every 4 hours PRN  HYDROmorphone  Injectable 0.5 milliGRAM(s) every 4 hours PRN  influenza  Vaccine (HIGH DOSE) 0.5 milliLiter(s) once  magnesium hydroxide Suspension 30 milliLiter(s) every 12 hours PRN  melatonin 3 milliGRAM(s) at bedtime PRN  methocarbamol 500 milliGRAM(s) every 8 hours  ondansetron Injectable 4 milliGRAM(s) every 6 hours PRN  pantoprazole    Tablet 40 milliGRAM(s) before breakfast  polyethylene glycol 3350 17 Gram(s) every 24 hours  pregabalin 75 milliGRAM(s) daily  senna 2 Tablet(s) at bedtime  valsartan 80 milliGRAM(s) at bedtime      RECENT LABS/IMAGING  - Reviewed Today                        9.1    10.84 )-----------( 363      ( 30 Mar 2025 09:13 )             27.2     03-30    134[L]  |  98  |  10.2  ----------------------------<  106[H]  3.7   |  24.0  |  0.30[L]    Ca    7.8[L]      30 Mar 2025 09:13  Phos  1.9     03-30  Mg     2.0     03-30    TPro  5.2[L]  /  Alb  2.7[L]  /  TBili  0.5  /  DBili  x   /  AST  46[H]  /  ALT  39[H]  /  AlkPhos  138[H]  03-30      Urinalysis Basic - ( 30 Mar 2025 09:13 )    Color: x / Appearance: x / SG: x / pH: x  Gluc: 106 mg/dL / Ketone: x  / Bili: x / Urobili: x   Blood: x / Protein: x / Nitrite: x   Leuk Esterase: x / RBC: x / WBC x   Sq Epi: x / Non Sq Epi: x / Bacteria: x            CT T & L SPINE 11/2024 - 1.  At L4-L5 degenerative grade 1 left anterolateral listhesis and advanced facet arthrosis contribute to likely moderate spinal canal stenosis and right worse than left lateral recess stenosis. Correlate for possible right-sided L5 radiculopathy. 2.  Background moderate S-shaped thoracic scoliosis and moderate lumbar levoscoliosis.    XR ABD 3/29 - Probable postop ileus.  ----------------------------------------------------------------------------------------  PHYSICAL EXAM  Constitutional - NAD, Appears Comfortable  Extremities - BUE swollen  Neurologic Exam -                    Cognitive - AAO to self, place, date, year, situation     FUNCTIONAL MOTOR EXAM - No focal deficits     Sensory - Intact to LT  Psychiatric - Fatigued  ----------------------------------------------------------------------------------------  ASSESSMENT/PLAN  64yFemale with functional deficits after two staged spinal surgery for scoliosis   Scoliosis s/p anterior and lateral fusion and posterior T4-pelvis - WBAT  HTN - Diovan  Pain - Tylenol, Dilaudid, Lyrica, Robaxin  Sleep - Melatonin   DVT PPX - SCDs, Lovenox  Rehab/Impaired mobility and function - Patient continues to require hospitalization for the above diagnoses and ongoing active management of comorbid complications that are substantially posing a threat to bodily function, functional ability and quality of life.     RECOMMEND - OOB daily     Patient independent with mobility.  Pending DC home.  CC: Patient being seen for rehabilitation follow up.  Patient reports she is better.  BM have improved.   Patient pending DC home.     FUNCTIONAL PROGRESS  3/30 PT  Sit-Stand Transfer Training  Transfer Training Sit-to-Stand Transfer: independent;  rolling walker  Transfer Training Stand-to-Sit Transfer: independent;  rolling walker  Sit-to-Stand Transfer Training Transfer Safety Analysis: decreased strength;  decreased flexibility;  pain;  rolling walker    Gait Training  Gait Training: independent;  rolling walker;  150 feet  Gait Analysis: 3-point gait   decreased rosita;  decreased strength;  impaired balance;  150 feet;  rolling walker    3/30 OT  Sit-Stand Transfer Training  Transfer Training Sit-to-Stand Transfer: independent;  weight-bearing as tolerated   rolling walker  Transfer Training Stand-to-Sit Transfer: independent;  weight-bearing as tolerated   rolling walker  Sit-to-Stand Transfer Training Transfer Safety Analysis: decreased balance;  pain;  rolling walker    Toilet Transfer Training  Transfer Training Toilet Transfer: supervsion;  verbal cues;  weight-bearing as tolerated   rolling walker;  low toilet   Toilet Transfer Training Transfer Safety Analysis: decreased weight-shifting ability;  decreased balance;  pain;  decreased strength;  rolling walker    Functional Endurance  Functional Endurance Detail: Pt ambulated with modified independence and RW, +external cues around bed area, to/from the bathroom and in the hallway demonstrating good safety awareness and obstacle negotiation. Pt educated in energy conservation techniques including proper breathing and activity pacing.     Lower Body Dressing Training  Lower Body Dressing Training Assistance: moderate assist (50% patient effort);  pt with decreased flexibility, deferred LB device training states spouse is able and willing to assist with task as needed ;  decreased flexibility;  pain;  spinal precautions     Grooming Training  Grooming Training Assistance: independent;  pain;  decreased strength    Toilet Training  Toilet Training Assistance: moderate assist (50% patient effort);  decreased flexibility;  decreased ROM;  impaired balance;  pain    VITALS  T(C): 37.1 (03-31-25 @ 08:29), Max: 37.1 (03-30-25 @ 13:00)  HR: 98 (03-31-25 @ 08:29) (86 - 98)  BP: 144/89 (03-31-25 @ 08:29) (117/76 - 144/89)  RR: 17 (03-31-25 @ 08:29) (17 - 20)  SpO2: 93% (03-31-25 @ 08:29) (91% - 96%)  Wt(kg): --    MEDICATIONS   acetaminophen     Tablet .. 975 milliGRAM(s) every 8 hours  aluminum hydroxide/magnesium hydroxide/simethicone Suspension 30 milliLiter(s) every 12 hours PRN  bisacodyl Suppository 10 milliGRAM(s) daily PRN  chlorhexidine 2% Cloths 1 Application(s) daily  enoxaparin Injectable 40 milliGRAM(s) every 24 hours  HYDROmorphone   Tablet 1 milliGRAM(s) every 4 hours PRN  HYDROmorphone   Tablet 2 milliGRAM(s) every 4 hours PRN  HYDROmorphone  Injectable 0.5 milliGRAM(s) every 4 hours PRN  influenza  Vaccine (HIGH DOSE) 0.5 milliLiter(s) once  magnesium hydroxide Suspension 30 milliLiter(s) every 12 hours PRN  melatonin 3 milliGRAM(s) at bedtime PRN  methocarbamol 500 milliGRAM(s) every 8 hours  ondansetron Injectable 4 milliGRAM(s) every 6 hours PRN  pantoprazole    Tablet 40 milliGRAM(s) before breakfast  polyethylene glycol 3350 17 Gram(s) every 24 hours  pregabalin 75 milliGRAM(s) daily  senna 2 Tablet(s) at bedtime  valsartan 80 milliGRAM(s) at bedtime      RECENT LABS/IMAGING  - Reviewed Today                        9.1    10.84 )-----------( 363      ( 30 Mar 2025 09:13 )             27.2     03-30    134[L]  |  98  |  10.2  ----------------------------<  106[H]  3.7   |  24.0  |  0.30[L]    Ca    7.8[L]      30 Mar 2025 09:13  Phos  1.9     03-30  Mg     2.0     03-30    TPro  5.2[L]  /  Alb  2.7[L]  /  TBili  0.5  /  DBili  x   /  AST  46[H]  /  ALT  39[H]  /  AlkPhos  138[H]  03-30      Urinalysis Basic - ( 30 Mar 2025 09:13 )    Color: x / Appearance: x / SG: x / pH: x  Gluc: 106 mg/dL / Ketone: x  / Bili: x / Urobili: x   Blood: x / Protein: x / Nitrite: x   Leuk Esterase: x / RBC: x / WBC x   Sq Epi: x / Non Sq Epi: x / Bacteria: x            CT T & L SPINE 11/2024 - 1.  At L4-L5 degenerative grade 1 left anterolateral listhesis and advanced facet arthrosis contribute to likely moderate spinal canal stenosis and right worse than left lateral recess stenosis. Correlate for possible right-sided L5 radiculopathy. 2.  Background moderate S-shaped thoracic scoliosis and moderate lumbar levoscoliosis.    XR ABD 3/29 - Probable postop ileus.  ----------------------------------------------------------------------------------------  PHYSICAL EXAM  Constitutional - NAD, Appears Comfortable  Extremities - BUE swollen  Neurologic Exam -                    Cognitive - AAO to self, place, date, year, situation     FUNCTIONAL MOTOR EXAM - No focal deficits     Sensory - Intact to LT  Psychiatric - Fatigued  ----------------------------------------------------------------------------------------  ASSESSMENT/PLAN  64yFemale with functional deficits after two staged spinal surgery for scoliosis   Scoliosis s/p anterior and lateral fusion and posterior T4-pelvis - WBAT  HTN - Diovan  Pain - Tylenol, Dilaudid, Lyrica, Robaxin  Sleep - Melatonin   DVT PPX - SCDs, Lovenox  Rehab/Impaired mobility and function - Patient continues to require hospitalization for the above diagnoses and ongoing active management of comorbid complications that are substantially posing a threat to bodily function, functional ability and quality of life.     RECOMMEND - OOB daily     Patient independent with mobility.  Pending DC home.     PMR to sign off.

## 2025-03-31 NOTE — DISCHARGE NOTE NURSING/CASE MANAGEMENT/SOCIAL WORK - FINANCIAL ASSISTANCE
[facility] provides services at a reduced cost to those who are determined to be eligible through [facility]’s financial assistance program. Information regarding [facility]’s financial assistance program can be found by going to [email or web address] or by calling [phone].

## 2025-03-31 NOTE — DISCHARGE NOTE NURSING/CASE MANAGEMENT/SOCIAL WORK - PATIENT PORTAL LINK FT
You can access the FollowMyHealth Patient Portal offered by [facility] by registering at the following website: [email or web address]. By joining [employer]’s FollowMyHealth portal, you will also be able to view your health information using other applications (apps) compatible with our system.

## 2025-03-31 NOTE — PROGRESS NOTE ADULT - REASON FOR ADMISSION
Scoliosis
Back and leg pain
Scoliosis
back and leg pain, scoliosis
lumbar stenosis
S/p anterior lumbar interbody fusion L4-L5, L5-S1, lateral fusion L2-L3, L3-L4
Scoliosis

## 2025-03-31 NOTE — PROGRESS NOTE ADULT - SUBJECTIVE AND OBJECTIVE BOX
[facility] Division of Hospital Medicine    Chief Complaint:  Scoliosis    SUBJECTIVE / OVERNIGHT EVENTS: Patient seen and examined. Had Bm last night and all night from the mag citrate. She reports she feels better but still feels bloated. She is passing gas and has been tolerating CLD. No chest pain and no shortness of breath.     Patient denies chest pain, SOB, abd pain, N/V, fever, chills, dysuria or any other complaints. All remainder ROS negative.     MEDICATIONS  (STANDING):  acetaminophen     Tablet .. 975 milliGRAM(s) Oral every 8 hours  chlorhexidine 2% Cloths 1 Application(s) Topical daily  enoxaparin Injectable 40 milliGRAM(s) SubCutaneous every 24 hours  influenza  Vaccine (HIGH DOSE) 0.5 milliLiter(s) IntraMuscular once  methocarbamol 500 milliGRAM(s) Oral every 8 hours  pantoprazole    Tablet 40 milliGRAM(s) Oral before breakfast  polyethylene glycol 3350 17 Gram(s) Oral every 24 hours  pregabalin 75 milliGRAM(s) Oral daily  senna 2 Tablet(s) Oral at bedtime  valsartan 80 milliGRAM(s) Oral at bedtime    MEDICATIONS  (PRN):  aluminum hydroxide/magnesium hydroxide/simethicone Suspension 30 milliLiter(s) Oral every 12 hours PRN Indigestion  bisacodyl Suppository 10 milliGRAM(s) Rectal daily PRN Constipation  HYDROmorphone   Tablet 1 milliGRAM(s) Oral every 4 hours PRN Moderate Pain (4 - 6)  HYDROmorphone   Tablet 2 milliGRAM(s) Oral every 4 hours PRN Severe Pain (7 - 10)  HYDROmorphone  Injectable 0.5 milliGRAM(s) IV Push every 4 hours PRN breakthrough  magnesium hydroxide Suspension 30 milliLiter(s) Oral every 12 hours PRN Constipation  melatonin 3 milliGRAM(s) Oral at bedtime PRN Insomnia  ondansetron Injectable 4 milliGRAM(s) IV Push every 6 hours PRN Nausea and/or Vomiting        I&O's Summary    30 Mar 2025 07:01  -  31 Mar 2025 07:00  --------------------------------------------------------  IN: 1725 mL / OUT: 782 mL / NET: 943 mL        PHYSICAL EXAM:  Vital Signs Last 24 Hrs  T(C): 37.1 (31 Mar 2025 08:29), Max: 37.1 (30 Mar 2025 13:00)  T(F): 98.7 (31 Mar 2025 08:29), Max: 98.7 (30 Mar 2025 13:00)  HR: 98 (31 Mar 2025 08:29) (86 - 98)  BP: 144/89 (31 Mar 2025 08:29) (119/80 - 144/89)  BP(mean): --  RR: 17 (31 Mar 2025 08:29) (17 - 18)  SpO2: 93% (31 Mar 2025 08:29) (91% - 96%)    Parameters below as of 31 Mar 2025 08:29  Patient On (Oxygen Delivery Method): room air      CONSTITUTIONAL: NAD  ENMT: Moist oral mucosa, no pharyngeal injection or exudates;  RESPIRATORY: Normal respiratory effort; lungs are clear to auscultation bilaterally  CARDIOVASCULAR: Regular rate and rhythm, normal S1 and S2,  No lower extremity edema  ABDOMEN: nontender to palpation, + bowel sounds, no rebound or guarding  MUSCLOSKELETAL:  no joint swelling or tenderness to palpation, back dressing c/d/i  PSYCH: A+O to person, place, and time; affect appropriate  NEUROLOGY: CN 2-12 are intact and symmetric;  SKIN: No rashes; no palpable lesions      LABS:                        9.1    10.84 )-----------( 363      ( 30 Mar 2025 09:13 )             27.2     03-30    134[L]  |  98  |  10.2  ----------------------------<  106[H]  3.7   |  24.0  |  0.30[L]    Ca    7.8[L]      30 Mar 2025 09:13  Phos  1.9     03-30  Mg     2.0     03-30    TPro  5.2[L]  /  Alb  2.7[L]  /  TBili  0.5  /  DBili  x   /  AST  46[H]  /  ALT  39[H]  /  AlkPhos  138[H]  03-30          Urinalysis Basic - ( 30 Mar 2025 09:13 )    Color: x / Appearance: x / SG: x / pH: x  Gluc: 106 mg/dL / Ketone: x  / Bili: x / Urobili: x   Blood: x / Protein: x / Nitrite: x   Leuk Esterase: x / RBC: x / WBC x   Sq Epi: x / Non Sq Epi: x / Bacteria: x        CAPILLARY BLOOD GLUCOSE            RADIOLOGY & ADDITIONAL TESTS:  Results Reviewed:   Imaging Personally Reviewed:  Electrocardiogram Personally Reviewed:

## 2025-03-31 NOTE — PROGRESS NOTE ADULT - PROVIDER SPECIALTY LIST ADULT
Hospitalist
Hospitalist
Orthopedics
SICU
Vascular Surgery
Vascular Surgery
Orthopedics
Physiatry
SICU
Vascular Surgery
Orthopedics
Orthopedics
Pain Medicine
SICU
Hospitalist
Hospitalist

## 2025-03-31 NOTE — PROGRESS NOTE ADULT - ASSESSMENT
65 y/o female with Hx of HTN, Duodenal spasm, osteoporosis, Scoliosis, s/p b/l shoulder, s/p lumpectomy, she has complex adult deformity r/t scoliosis which she has undergone many years of conservative care including injections physical therapy core strengthening home exercise excetra. She is admitted for elective anterior lumbar interbody fusion L4-L5, L5-S1 Lateral Fusion L2-L3, L3-L4 on 3/24/2025 AND Posterior instrumented FusionT9 Pelvis, Possible T4-Pelvis on 3/26/25 with Dr. Cao s/p procedure, she was monitored in ICU post operatively, during the course she has hypocalcemia, her calcium was replaced, require pressors over the course of ICU, now has been weaned off, she also require 1 unit of PRBCs as well for acute blood loss anemia, patient has been weaned off from the Pressors. Medicine consulted for Medical Management.          Scoliosis s/p  anterior lumbar interbody fusion L4-L5, L5-S1 Lateral Fusion L2-L3, L3-L4 on 3/24/2025 AND Posterior instrumented FusionT9 Pelvis, Possible T4-Pelvis on 3/26/25  - opiate induced constipation regimen   - encouraging incentive spirometry   -c/w local wound care per ortho   -DVT prophylaxis and Pain meds as per Ortho team   -PT/OT and weight bearing per ortho   -cyclobenzaprine 10 mg once a day  -pregabalin 75 mg once a day    Post operative ileus, resolved  cxr noted, had BMs overnight and this am  - advance to reg diet  - stop IVFs  - labs, will replete electrolytes if needed  - senna and Miralax can hold today as she has had loose stools  - prn Dulcolax suppository     Acute blood loss anemia due to procedure: Iron supplement, will monitor CBC, if trends further down, will transfuse    Hx of HTN: At home takes valsartan-hydrochlorothiazide 80 mg-12.5 mg once a day (at bedtime). Cont Valsartan. Can resume HCTZ on dc.     Osteoporosis: On Boniva 150 mg every 4 weeks    dvt ppx: Lovenox    Pending labs patient can be discharged from medicine perspective once cleared by PT and Ortho  Discussed with Ortho PA

## 2025-03-31 NOTE — PROGRESS NOTE ADULT - SUBJECTIVE AND OBJECTIVE BOX
Subjective and Objective:   Pt Name: KIM ROLON  MRN: [id number]      Patient is POD #5 s/p lumbar fusion lateral L2-4, anterior lumbar fusion L4-S1, T6-pelvis fusion. Patient visited and examined at bedside this AM. Patient reports no pain. Patient had BM yesterday and reports she is passing gas and has an appetite. Denies numbness, paresthesia, motor weakness. No new orthopedic complaints at this time.    PAST MEDICAL & SURGICAL HISTORY:  H/O osteoporosis  HTN (hypertension)  Scoliosis  Lumbar spondylosis  H/O shoulder surgery  H/O lumpectomy    Allergies: gabapentin (Rash)  sulfa drugs (Other)    Medications: acetaminophen     Tablet .. 975 milliGRAM(s) Oral every 8 hours  aluminum hydroxide/magnesium hydroxide/simethicone Suspension 30 milliLiter(s) Oral every 12 hours PRN  chlorhexidine 2% Cloths 1 Application(s) Topical daily  HYDROmorphone   Tablet 1 milliGRAM(s) Oral every 4 hours PRN  HYDROmorphone   Tablet 2 milliGRAM(s) Oral every 4 hours PRN  HYDROmorphone  Injectable 0.5 milliGRAM(s) IV Push every 4 hours PRN  influenza  Vaccine (HIGH DOSE) 0.5 milliLiter(s) IntraMuscular once  magnesium hydroxide Suspension 30 milliLiter(s) Oral every 12 hours PRN  melatonin 3 milliGRAM(s) Oral at bedtime PRN  methocarbamol 500 milliGRAM(s) Oral every 8 hours  ondansetron Injectable 4 milliGRAM(s) IV Push every 6 hours PRN  pantoprazole    Tablet 40 milliGRAM(s) Oral before breakfast  polyethylene glycol 3350 17 Gram(s) Oral every 24 hours  pregabalin 75 milliGRAM(s) Oral daily  senna 2 Tablet(s) Oral at bedtime    Ambulation: Walking w/ walker               Physical; Exam:     Vital Signs Last 24 Hrs  T(C): 36.7 (31 Mar 2025 04:01), Max: 37.1 (30 Mar 2025 13:00)  T(F): 98.1 (31 Mar 2025 04:01), Max: 98.7 (30 Mar 2025 13:00)  HR: 91 (31 Mar 2025 04:01) (86 - 91)  BP: 137/89 (31 Mar 2025 04:01) (117/76 - 139/87)  BP(mean): 90 (30 Mar 2025 09:56) (90 - 90)  RR: 18 (31 Mar 2025 04:01) (18 - 20)  SpO2: 91% (31 Mar 2025 04:01) (91% - 96%)    Parameters below as of 31 Mar 2025 04:01  Patient On (Oxygen Delivery Method): room air                          9.1    10.84 )-----------( 363      ( 30 Mar 2025 09:13 )             27.2     Daily     Appearance: Alert, responsive, in no acute distress.  Neurological: Sensation is grossly intact to light touch. 5/5 motor function of all extremities. No focal deficits or weaknesses found.  Skin: no rash on visible skin. Skin is clean, dry and intact. No bleeding. No abrasions. No ulcerations. Abdominal lateral and lumbar mepilex dressings remain clean dry and intact. Drain removed 3/30   Vascular: 2+ distal pulses. Cap refill < 2 sec. No signs of venous   insufficiency   or stasis. No extremity ulcerations. No cyanosis.      A/P:  Pt is a  64y Female POD #5 s/p lumbar fusion lateral L2-4, anterior lumbar fusion L4-S1, T6-pelvis fusion.    PLAN:   - WBAT  - PT/ gait training  - Bowel regimen  - C/w Lovenox for DVT prophalaxis  - Pain control  - F/u PMR consulted.

## 2025-04-01 ENCOUNTER — NON-APPOINTMENT (OUTPATIENT)
Age: 65
End: 2025-04-01

## 2025-04-01 ENCOUNTER — TRANSCRIPTION ENCOUNTER (OUTPATIENT)
Age: 65
End: 2025-04-01

## 2025-04-03 ENCOUNTER — TRANSCRIPTION ENCOUNTER (OUTPATIENT)
Age: 65
End: 2025-04-03

## 2025-04-03 NOTE — CHART NOTE - NSCHARTNOTEFT_GEN_A_CORE
Post-Discharge Medication Review: Completed	  Patient contacted to offer medication counseling post-discharge. Medication reconciliation completed. Per patient, medications include:	  	  1.	acetaminophen 325 mg oral tablet 3 tab(s) orally every 8 hours  2.	Boniva 150 mg oral tablet 1 tab(s) orally every 4 weeks  3.	citracal slow release 1200 mg once  4.	fish oil once daily  5.	methocarbamol 500 mg oral tablet 1 tab(s) orally every 8 hours MDD: 3  6.	Narcan 4 mg/0.1 mL nasal spray 1 spray(s) intranasally once  7.	oxyCODONE 5 mg oral tablet 1 tab(s) orally every 6 hours as needed for moderate pain MDD: 4  8.	pantoprazole 40 mg oral delayed release tablet 1 tab(s) orally once a day (before a meal)  9.	pregabalin 75 mg oral capsule 1 cap(s) orally once a day  10.	Senna S 50 mg-8.6 mg oral tablet 2 tab(s) orally once a day (at bedtime)  11.	valsartan-hydrochlorothiazide 80 mg-12.5 mg oral tablet 1 tab(s) orally once a day (at bedtime)  12.	vitamin C once daily 1000 mg daily  13.	Xarelto 10 mg oral tablet 1 tab(s) orally once a day MDD: 1  	  Medication name, indication, administration, side effect, and monitoring reviewed for new medications during post discharge counseling visit with patient. Patient demonstrated understanding. Counseling offered for all medications.	  	  Jose Pimentel, PharmD	  Clinical Pharmacy Specialist, Pharmacy Telehealth Team	  Can be reached via [employer] Teams or [phone]

## 2025-04-10 ENCOUNTER — TRANSCRIPTION ENCOUNTER (OUTPATIENT)
Age: 65
End: 2025-04-10

## 2025-04-10 ENCOUNTER — APPOINTMENT (OUTPATIENT)
Dept: ORTHOPEDIC SURGERY | Facility: CLINIC | Age: 65
End: 2025-04-10
Payer: COMMERCIAL

## 2025-04-10 DIAGNOSIS — Z98.1 ARTHRODESIS STATUS: ICD-10-CM

## 2025-04-10 PROBLEM — M47.816 SPONDYLOSIS WITHOUT MYELOPATHY OR RADICULOPATHY, LUMBAR REGION: Chronic | Status: ACTIVE | Noted: 2025-03-07

## 2025-04-10 PROBLEM — Z87.39 PERSONAL HISTORY OF OTHER DISEASES OF THE MUSCULOSKELETAL SYSTEM AND CONNECTIVE TISSUE: Chronic | Status: ACTIVE | Noted: 2025-03-07

## 2025-04-10 PROBLEM — I10 ESSENTIAL (PRIMARY) HYPERTENSION: Chronic | Status: ACTIVE | Noted: 2025-03-07

## 2025-04-10 PROCEDURE — 99024 POSTOP FOLLOW-UP VISIT: CPT

## 2025-04-10 PROCEDURE — 72100 X-RAY EXAM L-S SPINE 2/3 VWS: CPT

## 2025-04-17 ENCOUNTER — NON-APPOINTMENT (OUTPATIENT)
Age: 65
End: 2025-04-17

## 2025-04-18 ENCOUNTER — TRANSCRIPTION ENCOUNTER (OUTPATIENT)
Age: 65
End: 2025-04-18

## 2025-04-28 ENCOUNTER — NON-APPOINTMENT (OUTPATIENT)
Age: 65
End: 2025-04-28

## 2025-05-01 ENCOUNTER — APPOINTMENT (OUTPATIENT)
Dept: ORTHOPEDIC SURGERY | Facility: CLINIC | Age: 65
End: 2025-05-01
Payer: COMMERCIAL

## 2025-05-01 DIAGNOSIS — Z98.1 ARTHRODESIS STATUS: ICD-10-CM

## 2025-05-01 PROCEDURE — 72080 X-RAY EXAM THORACOLMB 2/> VW: CPT

## 2025-05-01 PROCEDURE — 99024 POSTOP FOLLOW-UP VISIT: CPT

## 2025-05-29 ENCOUNTER — APPOINTMENT (OUTPATIENT)
Dept: ORTHOPEDIC SURGERY | Facility: CLINIC | Age: 65
End: 2025-05-29
Payer: COMMERCIAL

## 2025-05-29 DIAGNOSIS — Z98.1 ARTHRODESIS STATUS: ICD-10-CM

## 2025-05-29 PROCEDURE — 72080 X-RAY EXAM THORACOLMB 2/> VW: CPT

## 2025-05-29 PROCEDURE — 99024 POSTOP FOLLOW-UP VISIT: CPT

## 2025-06-13 ENCOUNTER — NON-APPOINTMENT (OUTPATIENT)
Age: 65
End: 2025-06-13

## 2025-07-03 ENCOUNTER — APPOINTMENT (OUTPATIENT)
Dept: ORTHOPEDIC SURGERY | Facility: CLINIC | Age: 65
End: 2025-07-03
Payer: COMMERCIAL

## 2025-07-03 VITALS
WEIGHT: 94 LBS | HEART RATE: 82 BPM | HEIGHT: 71 IN | DIASTOLIC BLOOD PRESSURE: 77 MMHG | SYSTOLIC BLOOD PRESSURE: 108 MMHG | BODY MASS INDEX: 13.16 KG/M2

## 2025-07-03 PROCEDURE — 72110 X-RAY EXAM L-2 SPINE 4/>VWS: CPT

## 2025-07-03 PROCEDURE — 99213 OFFICE O/P EST LOW 20 MIN: CPT

## 2025-07-10 ENCOUNTER — APPOINTMENT (OUTPATIENT)
Dept: ORTHOPEDIC SURGERY | Facility: CLINIC | Age: 65
End: 2025-07-10
Payer: COMMERCIAL

## 2025-07-10 VITALS
BODY MASS INDEX: 18.31 KG/M2 | DIASTOLIC BLOOD PRESSURE: 85 MMHG | RESPIRATION RATE: 16 BRPM | HEART RATE: 92 BPM | HEIGHT: 61 IN | SYSTOLIC BLOOD PRESSURE: 125 MMHG | WEIGHT: 97 LBS

## 2025-07-10 PROBLEM — Z91.81 STATUS POST FALL: Status: ACTIVE | Noted: 2025-07-10

## 2025-07-10 PROCEDURE — 72080 X-RAY EXAM THORACOLMB 2/> VW: CPT

## 2025-07-10 PROCEDURE — 99212 OFFICE O/P EST SF 10 MIN: CPT

## 2025-07-22 ENCOUNTER — NON-APPOINTMENT (OUTPATIENT)
Age: 65
End: 2025-07-22

## 2025-08-05 ENCOUNTER — OFFICE (OUTPATIENT)
Dept: URBAN - METROPOLITAN AREA CLINIC 102 | Facility: CLINIC | Age: 65
Setting detail: OPHTHALMOLOGY
End: 2025-08-05
Payer: COMMERCIAL

## 2025-08-05 DIAGNOSIS — H43.813: ICD-10-CM

## 2025-08-05 DIAGNOSIS — H40.033: ICD-10-CM

## 2025-08-05 DIAGNOSIS — H02.825: ICD-10-CM

## 2025-08-05 DIAGNOSIS — H25.13: ICD-10-CM

## 2025-08-05 PROCEDURE — 92014 COMPRE OPH EXAM EST PT 1/>: CPT | Performed by: OPHTHALMOLOGY

## 2025-08-05 ASSESSMENT — REFRACTION_MANIFEST
OS_VA1: 20/25-1
OD_AXIS: 068
OS_AXIS: 135
OD_SPHERE: +2.25
OS_SPHERE: PLANO
OD_CYLINDER: -0.75
OD_VA1: 20/20
OS_CYLINDER: -0.50
OS_CYLINDER: SPHERE
OD_VA1: 20/20-1
OS_VA1: 20/20
OS_ADD: +2.25
OD_CYLINDER: SPHERE
OD_ADD: +2.25
OD_SPHERE: PLANO
OS_SPHERE: +1.75

## 2025-08-05 ASSESSMENT — VISUAL ACUITY
OD_BCVA: 20/30
OS_BCVA: 20/30

## 2025-08-05 ASSESSMENT — REFRACTION_CURRENTRX
OD_SPHERE: +1.75
OS_CYLINDER: -0.50
OD_AXIS: 0
OD_VPRISM_DIRECTION: SV
OS_AXIS: 090
OS_SPHERE: +2.25
OS_VPRISM_DIRECTION: SV
OD_CYLINDER: SPH
OD_OVR_VA: 20/
OS_OVR_VA: 20/

## 2025-08-05 ASSESSMENT — KERATOMETRY
OD_AXISANGLE_DEGREES: 090
OD_K1POWER_DIOPTERS: 45.25
METHOD_AUTO_MANUAL: AUTO
OS_K2POWER_DIOPTERS: 45.50
OS_K1POWER_DIOPTERS: 44.75
OD_K2POWER_DIOPTERS: 45.25
OS_AXISANGLE_DEGREES: 072

## 2025-08-05 ASSESSMENT — REFRACTION_AUTOREFRACTION
OS_AXIS: 126
OD_AXIS: 075
OD_CYLINDER: -0.75
OS_SPHERE: +1.75
OS_CYLINDER: -1.00
OD_SPHERE: +1.75

## 2025-08-05 ASSESSMENT — TONOMETRY
OD_IOP_MMHG: 20
OS_IOP_MMHG: 18

## 2025-08-05 ASSESSMENT — CONFRONTATIONAL VISUAL FIELD TEST (CVF)
OS_FINDINGS: FULL
OD_FINDINGS: FULL

## 2025-08-07 ENCOUNTER — APPOINTMENT (OUTPATIENT)
Dept: ORTHOPEDIC SURGERY | Facility: CLINIC | Age: 65
End: 2025-08-07
Payer: COMMERCIAL

## 2025-08-07 VITALS
DIASTOLIC BLOOD PRESSURE: 88 MMHG | WEIGHT: 97 LBS | SYSTOLIC BLOOD PRESSURE: 128 MMHG | HEIGHT: 61 IN | HEART RATE: 70 BPM | BODY MASS INDEX: 18.31 KG/M2

## 2025-08-07 DIAGNOSIS — Z98.1 ARTHRODESIS STATUS: ICD-10-CM

## 2025-08-07 PROCEDURE — 20552 NJX 1/MLT TRIGGER POINT 1/2: CPT

## 2025-08-07 PROCEDURE — 99213 OFFICE O/P EST LOW 20 MIN: CPT | Mod: 24

## 2025-08-13 ENCOUNTER — APPOINTMENT (OUTPATIENT)
Dept: CARDIOLOGY | Facility: CLINIC | Age: 65
End: 2025-08-13
Payer: COMMERCIAL

## 2025-08-13 VITALS
WEIGHT: 94.25 LBS | DIASTOLIC BLOOD PRESSURE: 84 MMHG | BODY MASS INDEX: 17.81 KG/M2 | SYSTOLIC BLOOD PRESSURE: 128 MMHG | HEART RATE: 64 BPM | OXYGEN SATURATION: 99 %

## 2025-08-13 DIAGNOSIS — I07.1 RHEUMATIC TRICUSPID INSUFFICIENCY: ICD-10-CM

## 2025-08-13 DIAGNOSIS — I10 ESSENTIAL (PRIMARY) HYPERTENSION: ICD-10-CM

## 2025-08-13 DIAGNOSIS — I34.0 NONRHEUMATIC MITRAL (VALVE) INSUFFICIENCY: ICD-10-CM

## 2025-08-13 PROCEDURE — 93000 ELECTROCARDIOGRAM COMPLETE: CPT

## 2025-08-13 PROCEDURE — G2211 COMPLEX E/M VISIT ADD ON: CPT

## 2025-08-13 PROCEDURE — 99214 OFFICE O/P EST MOD 30 MIN: CPT

## 2025-08-13 RX ORDER — IBANDRONATE SODIUM 150 MG/1
150 TABLET, FILM COATED ORAL
Refills: 0 | Status: ACTIVE | COMMUNITY

## 2025-08-14 ENCOUNTER — APPOINTMENT (OUTPATIENT)
Dept: DERMATOLOGY | Facility: CLINIC | Age: 65
End: 2025-08-14
Payer: COMMERCIAL

## 2025-08-14 DIAGNOSIS — L82.1 OTHER SEBORRHEIC KERATOSIS: ICD-10-CM

## 2025-08-14 DIAGNOSIS — L60.3 NAIL DYSTROPHY: ICD-10-CM

## 2025-08-14 DIAGNOSIS — L81.4 OTHER MELANIN HYPERPIGMENTATION: ICD-10-CM

## 2025-08-14 DIAGNOSIS — L20.9 ATOPIC DERMATITIS, UNSPECIFIED: ICD-10-CM

## 2025-08-14 PROCEDURE — 99214 OFFICE O/P EST MOD 30 MIN: CPT

## 2025-08-21 ENCOUNTER — APPOINTMENT (OUTPATIENT)
Dept: ORTHOPEDIC SURGERY | Facility: CLINIC | Age: 65
End: 2025-08-21
Payer: COMMERCIAL

## 2025-08-21 VITALS
BODY MASS INDEX: 17.75 KG/M2 | WEIGHT: 94 LBS | SYSTOLIC BLOOD PRESSURE: 156 MMHG | HEIGHT: 61 IN | HEART RATE: 60 BPM | DIASTOLIC BLOOD PRESSURE: 89 MMHG

## 2025-08-21 DIAGNOSIS — Z98.1 ARTHRODESIS STATUS: ICD-10-CM

## 2025-08-21 DIAGNOSIS — M60.9 MYOSITIS, UNSPECIFIED: ICD-10-CM

## 2025-08-21 PROCEDURE — 99214 OFFICE O/P EST MOD 30 MIN: CPT | Mod: 24,25

## 2025-08-21 PROCEDURE — 20552 NJX 1/MLT TRIGGER POINT 1/2: CPT | Mod: LT

## 2025-08-25 ENCOUNTER — LABORATORY RESULT (OUTPATIENT)
Age: 65
End: 2025-08-25

## 2025-09-04 ENCOUNTER — APPOINTMENT (OUTPATIENT)
Dept: CARDIOLOGY | Facility: CLINIC | Age: 65
End: 2025-09-04
Payer: COMMERCIAL

## 2025-09-04 ENCOUNTER — APPOINTMENT (OUTPATIENT)
Dept: INTERNAL MEDICINE | Facility: CLINIC | Age: 65
End: 2025-09-04
Payer: COMMERCIAL

## 2025-09-04 ENCOUNTER — APPOINTMENT (OUTPATIENT)
Dept: ORTHOPEDIC SURGERY | Facility: CLINIC | Age: 65
End: 2025-09-04
Payer: COMMERCIAL

## 2025-09-04 VITALS
SYSTOLIC BLOOD PRESSURE: 124 MMHG | DIASTOLIC BLOOD PRESSURE: 84 MMHG | HEIGHT: 61 IN | OXYGEN SATURATION: 99 % | HEART RATE: 78 BPM | WEIGHT: 97 LBS | TEMPERATURE: 98.4 F | BODY MASS INDEX: 18.31 KG/M2

## 2025-09-04 VITALS
HEIGHT: 61 IN | DIASTOLIC BLOOD PRESSURE: 87 MMHG | WEIGHT: 97 LBS | SYSTOLIC BLOOD PRESSURE: 124 MMHG | BODY MASS INDEX: 18.31 KG/M2 | HEART RATE: 74 BPM

## 2025-09-04 DIAGNOSIS — R80.9 PROTEINURIA, UNSPECIFIED: ICD-10-CM

## 2025-09-04 DIAGNOSIS — Z87.39 PERSONAL HISTORY OF OTHER DISEASES OF THE MUSCULOSKELETAL SYSTEM AND CONNECTIVE TISSUE: ICD-10-CM

## 2025-09-04 DIAGNOSIS — Z86.19 PERSONAL HISTORY OF OTHER INFECTIOUS AND PARASITIC DISEASES: ICD-10-CM

## 2025-09-04 DIAGNOSIS — Z86.0100 PERSONAL HISTORY OF COLON POLYPS, UNSPECIFIED: ICD-10-CM

## 2025-09-04 DIAGNOSIS — R03.0 ELEVATED BLOOD-PRESSURE READING, W/OUT DIAGNOSIS OF HYPERTENSION: ICD-10-CM

## 2025-09-04 DIAGNOSIS — K63.5 POLYP OF COLON: ICD-10-CM

## 2025-09-04 DIAGNOSIS — Z87.898 PERSONAL HISTORY OF OTHER SPECIFIED CONDITIONS: ICD-10-CM

## 2025-09-04 DIAGNOSIS — R79.89 OTHER SPECIFIED ABNORMAL FINDINGS OF BLOOD CHEMISTRY: ICD-10-CM

## 2025-09-04 DIAGNOSIS — Z98.1 ARTHRODESIS STATUS: ICD-10-CM

## 2025-09-04 DIAGNOSIS — Z01.810 ENCOUNTER FOR PREPROCEDURAL CARDIOVASCULAR EXAMINATION: ICD-10-CM

## 2025-09-04 DIAGNOSIS — Z01.818 ENCOUNTER FOR OTHER PREPROCEDURAL EXAMINATION: ICD-10-CM

## 2025-09-04 DIAGNOSIS — G47.00 INSOMNIA, UNSPECIFIED: ICD-10-CM

## 2025-09-04 DIAGNOSIS — I10 ESSENTIAL (PRIMARY) HYPERTENSION: ICD-10-CM

## 2025-09-04 DIAGNOSIS — Z91.81 HISTORY OF FALLING: ICD-10-CM

## 2025-09-04 DIAGNOSIS — M81.0 AGE-RELATED OSTEOPOROSIS W/OUT CURRENT PATHOLOGICAL FRACTURE: ICD-10-CM

## 2025-09-04 DIAGNOSIS — M75.42 IMPINGEMENT SYNDROME OF LEFT SHOULDER: ICD-10-CM

## 2025-09-04 DIAGNOSIS — R25.2 CRAMP AND SPASM: ICD-10-CM

## 2025-09-04 DIAGNOSIS — M60.9 MYOSITIS, UNSPECIFIED: ICD-10-CM

## 2025-09-04 DIAGNOSIS — Z86.79 PERSONAL HISTORY OF OTHER DISEASES OF THE CIRCULATORY SYSTEM: ICD-10-CM

## 2025-09-04 DIAGNOSIS — Z00.00 ENCOUNTER FOR GENERAL ADULT MEDICAL EXAMINATION W/OUT ABNORMAL FINDINGS: ICD-10-CM

## 2025-09-04 DIAGNOSIS — R60.0 LOCALIZED EDEMA: ICD-10-CM

## 2025-09-04 PROCEDURE — 20552 NJX 1/MLT TRIGGER POINT 1/2: CPT

## 2025-09-04 PROCEDURE — 93306 TTE W/DOPPLER COMPLETE: CPT

## 2025-09-04 PROCEDURE — 99212 OFFICE O/P EST SF 10 MIN: CPT | Mod: 24

## 2025-09-04 PROCEDURE — 99397 PER PM REEVAL EST PAT 65+ YR: CPT

## 2025-09-04 RX ORDER — KETOROLAC TROMETHAMINE 10 MG/1
10 TABLET, FILM COATED ORAL EVERY 8 HOURS
Qty: 10 | Refills: 0 | Status: ACTIVE | COMMUNITY
Start: 2025-09-04 | End: 1900-01-01

## 2025-09-04 RX ORDER — METHYLPREDNISOLONE 4 MG/1
4 TABLET ORAL
Qty: 1 | Refills: 1 | Status: DISCONTINUED | COMMUNITY
Start: 2025-08-28 | End: 2025-09-04

## 2025-09-04 RX ORDER — OMEPRAZOLE 20 MG/1
20 CAPSULE, DELAYED RELEASE ORAL DAILY
Qty: 30 | Refills: 0 | Status: ACTIVE | COMMUNITY
Start: 2025-09-04 | End: 1900-01-01

## 2025-09-05 ENCOUNTER — NON-APPOINTMENT (OUTPATIENT)
Age: 65
End: 2025-09-05

## 2025-09-09 ENCOUNTER — NON-APPOINTMENT (OUTPATIENT)
Age: 65
End: 2025-09-09

## 2025-09-11 DIAGNOSIS — I34.0 NONRHEUMATIC MITRAL (VALVE) INSUFFICIENCY: ICD-10-CM

## 2025-09-17 ENCOUNTER — APPOINTMENT (OUTPATIENT)
Dept: CT IMAGING | Facility: CLINIC | Age: 65
End: 2025-09-17

## 2025-09-17 ENCOUNTER — RESULT REVIEW (OUTPATIENT)
Age: 65
End: 2025-09-17

## (undated) DEVICE — POSITIONER FOAM EGG CRATE ULNAR 2PCS (PINK)

## (undated) DEVICE — DRAPE SPLIT SHEET 77" X 108"

## (undated) DEVICE — DRSG MEPILEX 10 X 25CM (4 X 10") POST OP AG SILVER

## (undated) DEVICE — DRAPE INSTRUMENT POUCH 6.75" X 11"

## (undated) DEVICE — Device

## (undated) DEVICE — SUT VICRYL PLUS 1 18" CT-1 UNDYED (POP-OFF)

## (undated) DEVICE — SUT VICRYL PLUS 0 18" CT-1 UNDYED (POP-OFF)

## (undated) DEVICE — SUT VICRYL 2-0 27" CT-2 UNDYED

## (undated) DEVICE — FOLEY TRAY 16FR LF URINE METER SURESTEP

## (undated) DEVICE — DRAIN JACKSON PRATT 7MM FLAT FULL W 15 FR TROCAR

## (undated) DEVICE — POSITIONER JACKSON TABLE HEADREST 7", CHEST, HIP, THIGH PADS, ARM CRADLE

## (undated) DEVICE — MIDAS REX MR8 MATCH HEAD FLUTED SM BORE 3MM X 10CM

## (undated) DEVICE — VENODYNE/SCD SLEEVE CALF MEDIUM

## (undated) DEVICE — TUBING BIPOLAR IRRIGATOR AND CORD SET

## (undated) DEVICE — GLV 6.5 PROTEXIS (WHITE)

## (undated) DEVICE — POSITIONER FOAM LAMINECTOMY ARM CRADLE (PINK)

## (undated) DEVICE — SUT SILK 3-0 30" TIES

## (undated) DEVICE — ELCTR BOVIE PENCIL BLADE 10FT

## (undated) DEVICE — STRYKER BONE MILL & FINE BLADE

## (undated) DEVICE — MARKING PEN W RULER

## (undated) DEVICE — DRAPE TOWEL 1000 SMALL 17" X 11"

## (undated) DEVICE — PACK NEURO

## (undated) DEVICE — DRAPE LAPAROTOMY T-DRAPE 102" X 121"

## (undated) DEVICE — GLV 6.5 PROTEXIS (BLUE)

## (undated) DEVICE — SUCTION YANKAUER NO CONTROL VENT

## (undated) DEVICE — GOWN XL W TOWEL

## (undated) DEVICE — SUT SILK 0 30" TIES

## (undated) DEVICE — WARMING BLANKET UPPER ADULT

## (undated) DEVICE — DRAPE 3/4 SHEET 52X76"

## (undated) DEVICE — SUT PDS II 1 48" TP-1

## (undated) DEVICE — ELCTR BOVIE TIP BLADE INSULATED 6.5" EDGE

## (undated) DEVICE — GLV 8 PROTEXIS (WHITE)

## (undated) DEVICE — SUT VICRYL 3-0 18" SH UNDYED (POP-OFF)

## (undated) DEVICE — PACK MINOR WITH LAP

## (undated) DEVICE — DRSG MEPILEX 10 X 10CM (4 X 4") AG

## (undated) DEVICE — DRAPE C ARM UNIVERSAL

## (undated) DEVICE — DRSG DERMABOND 0.7ML

## (undated) DEVICE — POSITIONER JACKSON TABLE HEADREST 7", CHEST, HIP, THIGH PADS

## (undated) DEVICE — SOL IRR POUR H2O 1000ML

## (undated) DEVICE — DRAIN RESERVOIR FOR JACKSON PRATT 100CC CARDINAL

## (undated) DEVICE — PREP DURAPREP 26CC

## (undated) DEVICE — DRAPE XL SHEET 77X98"

## (undated) DEVICE — DRAPE TOWEL BLUE 17" X 24"

## (undated) DEVICE — DRSG XEROFORM 5 X 9"

## (undated) DEVICE — SUT SILK 2-0 30" TIES

## (undated) DEVICE — SUT VICRYL PLUS 2-0 27" CP-2 UNDYED

## (undated) DEVICE — SOL IRR POUR NS 0.9% 1000ML

## (undated) DEVICE — DEPUY CANNULA FEN OPEN STERILE

## (undated) DEVICE — SUT VICRYL 3-0 27" CT-2 UNDYED

## (undated) DEVICE — SUT MONOCRYL 3-0 27" PS-2 UNDYED

## (undated) DEVICE — WOUND IRR SURGIPHOR

## (undated) DEVICE — SUT SILK 2-0 30" SH

## (undated) DEVICE — WARMING BLANKET LOWER ADULT

## (undated) DEVICE — FRAZIER CONNECTING TUBE 2FT 5MM

## (undated) DEVICE — GLV 7 PROTEXIS (WHITE)

## (undated) DEVICE — GLV 8 PROTEXIS (BLUE)

## (undated) DEVICE — ELCTR GROUNDING PAD ADULT COVIDIEN

## (undated) DEVICE — ELCTR BOVIE TIP BLADE INSULATED 2.75" EDGE WITH SAFETY

## (undated) DEVICE — DRAPE 1/2 SHEET 40X57"

## (undated) DEVICE — SUT VICRYL 2-0 18" CT-2 (POP-OFF)

## (undated) DEVICE — TUBING FOR SMOKE EVACUATOR (PURPLE END)

## (undated) DEVICE — STRYKER BONE MILL PREP & CARTRIDGE

## (undated) DEVICE — DRSG STERISTRIPS 0.5 X 4"

## (undated) DEVICE — SPONGE PEANUT AUTO COUNT